# Patient Record
Sex: FEMALE | Race: OTHER | NOT HISPANIC OR LATINO
[De-identification: names, ages, dates, MRNs, and addresses within clinical notes are randomized per-mention and may not be internally consistent; named-entity substitution may affect disease eponyms.]

---

## 2017-05-11 ENCOUNTER — TRANSCRIPTION ENCOUNTER (OUTPATIENT)
Age: 82
End: 2017-05-11

## 2017-05-11 ENCOUNTER — INPATIENT (INPATIENT)
Facility: HOSPITAL | Age: 82
LOS: 6 days | Discharge: NURSING, REHAB OR LONG-TERM CARE | DRG: 480 | End: 2017-05-18
Attending: INTERNAL MEDICINE | Admitting: INTERNAL MEDICINE
Payer: MEDICARE

## 2017-05-11 VITALS
HEIGHT: 58 IN | RESPIRATION RATE: 18 BRPM | WEIGHT: 130.07 LBS | SYSTOLIC BLOOD PRESSURE: 200 MMHG | OXYGEN SATURATION: 93 % | DIASTOLIC BLOOD PRESSURE: 77 MMHG | TEMPERATURE: 98 F | HEART RATE: 82 BPM

## 2017-05-11 DIAGNOSIS — I10 ESSENTIAL (PRIMARY) HYPERTENSION: ICD-10-CM

## 2017-05-11 DIAGNOSIS — S72.009A FRACTURE OF UNSPECIFIED PART OF NECK OF UNSPECIFIED FEMUR, INITIAL ENCOUNTER FOR CLOSED FRACTURE: ICD-10-CM

## 2017-05-11 DIAGNOSIS — Z29.9 ENCOUNTER FOR PROPHYLACTIC MEASURES, UNSPECIFIED: ICD-10-CM

## 2017-05-11 DIAGNOSIS — S72.002A FRACTURE OF UNSPECIFIED PART OF NECK OF LEFT FEMUR, INITIAL ENCOUNTER FOR CLOSED FRACTURE: ICD-10-CM

## 2017-05-11 DIAGNOSIS — G30.1 ALZHEIMER'S DISEASE WITH LATE ONSET: ICD-10-CM

## 2017-05-11 LAB
ABO RH CONFIRMATION: SIGNIFICANT CHANGE UP
ALBUMIN SERPL ELPH-MCNC: 4 G/DL — SIGNIFICANT CHANGE UP (ref 3.5–5)
ALP SERPL-CCNC: 108 U/L — SIGNIFICANT CHANGE UP (ref 40–120)
ALT FLD-CCNC: 24 U/L DA — SIGNIFICANT CHANGE UP (ref 10–60)
ANION GAP SERPL CALC-SCNC: 8 MMOL/L — SIGNIFICANT CHANGE UP (ref 5–17)
APTT BLD: 32.8 SEC — SIGNIFICANT CHANGE UP (ref 27.5–37.4)
AST SERPL-CCNC: 36 U/L — SIGNIFICANT CHANGE UP (ref 10–40)
BILIRUB SERPL-MCNC: 0.5 MG/DL — SIGNIFICANT CHANGE UP (ref 0.2–1.2)
BUN SERPL-MCNC: 19 MG/DL — HIGH (ref 7–18)
CALCIUM SERPL-MCNC: 9.4 MG/DL — SIGNIFICANT CHANGE UP (ref 8.4–10.5)
CHLORIDE SERPL-SCNC: 110 MMOL/L — HIGH (ref 96–108)
CO2 SERPL-SCNC: 26 MMOL/L — SIGNIFICANT CHANGE UP (ref 22–31)
CREAT SERPL-MCNC: 1.12 MG/DL — SIGNIFICANT CHANGE UP (ref 0.5–1.3)
EOSINOPHIL NFR BLD AUTO: 1 % — SIGNIFICANT CHANGE UP (ref 0–6)
GLUCOSE SERPL-MCNC: 114 MG/DL — HIGH (ref 70–99)
HCT VFR BLD CALC: 45.6 % — HIGH (ref 34.5–45)
HGB BLD-MCNC: 14.5 G/DL — SIGNIFICANT CHANGE UP (ref 11.5–15.5)
INR BLD: 1.03 RATIO — SIGNIFICANT CHANGE UP (ref 0.88–1.16)
LYMPHOCYTES # BLD AUTO: 69 % — HIGH (ref 13–44)
MCHC RBC-ENTMCNC: 30.8 PG — SIGNIFICANT CHANGE UP (ref 27–34)
MCHC RBC-ENTMCNC: 31.8 GM/DL — LOW (ref 32–36)
MCV RBC AUTO: 96.9 FL — SIGNIFICANT CHANGE UP (ref 80–100)
MONOCYTES NFR BLD AUTO: 6 % — SIGNIFICANT CHANGE UP (ref 2–14)
NEUTROPHILS NFR BLD AUTO: 21 % — LOW (ref 43–77)
PLATELET # BLD AUTO: 167 K/UL — SIGNIFICANT CHANGE UP (ref 150–400)
POTASSIUM SERPL-MCNC: 4.2 MMOL/L — SIGNIFICANT CHANGE UP (ref 3.5–5.3)
POTASSIUM SERPL-SCNC: 4.2 MMOL/L — SIGNIFICANT CHANGE UP (ref 3.5–5.3)
PROT SERPL-MCNC: 7.1 G/DL — SIGNIFICANT CHANGE UP (ref 6–8.3)
PROTHROM AB SERPL-ACNC: 11.2 SEC — SIGNIFICANT CHANGE UP (ref 9.8–12.7)
RBC # BLD: 4.71 M/UL — SIGNIFICANT CHANGE UP (ref 3.8–5.2)
RBC # FLD: 12.3 % — SIGNIFICANT CHANGE UP (ref 10.3–14.5)
SODIUM SERPL-SCNC: 144 MMOL/L — SIGNIFICANT CHANGE UP (ref 135–145)
WBC # BLD: 43.9 K/UL — CRITICAL HIGH (ref 3.8–10.5)
WBC # FLD AUTO: 43.9 K/UL — CRITICAL HIGH (ref 3.8–10.5)

## 2017-05-11 PROCEDURE — 99285 EMERGENCY DEPT VISIT HI MDM: CPT | Mod: 25

## 2017-05-11 PROCEDURE — 71010: CPT | Mod: 26

## 2017-05-11 PROCEDURE — 27245 TREAT THIGH FRACTURE: CPT | Mod: AS,LT

## 2017-05-11 PROCEDURE — 73552 X-RAY EXAM OF FEMUR 2/>: CPT | Mod: 26,LT

## 2017-05-11 PROCEDURE — 73502 X-RAY EXAM HIP UNI 2-3 VIEWS: CPT | Mod: 26,LT

## 2017-05-11 RX ORDER — FERROUS SULFATE 325(65) MG
325 TABLET ORAL
Qty: 0 | Refills: 0 | Status: DISCONTINUED | OUTPATIENT
Start: 2017-05-11 | End: 2017-05-18

## 2017-05-11 RX ORDER — ONDANSETRON 8 MG/1
4 TABLET, FILM COATED ORAL EVERY 6 HOURS
Qty: 0 | Refills: 0 | Status: DISCONTINUED | OUTPATIENT
Start: 2017-05-11 | End: 2017-05-18

## 2017-05-11 RX ORDER — CEFAZOLIN SODIUM 1 G
2000 VIAL (EA) INJECTION EVERY 8 HOURS
Qty: 0 | Refills: 0 | Status: COMPLETED | OUTPATIENT
Start: 2017-05-12 | End: 2017-05-12

## 2017-05-11 RX ORDER — MORPHINE SULFATE 50 MG/1
2 CAPSULE, EXTENDED RELEASE ORAL EVERY 6 HOURS
Qty: 0 | Refills: 0 | Status: DISCONTINUED | OUTPATIENT
Start: 2017-05-11 | End: 2017-05-11

## 2017-05-11 RX ORDER — METOPROLOL TARTRATE 50 MG
25 TABLET ORAL DAILY
Qty: 0 | Refills: 0 | Status: DISCONTINUED | OUTPATIENT
Start: 2017-05-11 | End: 2017-05-11

## 2017-05-11 RX ORDER — SODIUM CHLORIDE 9 MG/ML
1000 INJECTION, SOLUTION INTRAVENOUS
Qty: 0 | Refills: 0 | Status: DISCONTINUED | OUTPATIENT
Start: 2017-05-11 | End: 2017-05-11

## 2017-05-11 RX ORDER — METOPROLOL TARTRATE 50 MG
25 TABLET ORAL DAILY
Qty: 0 | Refills: 0 | Status: DISCONTINUED | OUTPATIENT
Start: 2017-05-11 | End: 2017-05-14

## 2017-05-11 RX ORDER — DEXTROSE MONOHYDRATE, SODIUM CHLORIDE, AND POTASSIUM CHLORIDE 50; .745; 4.5 G/1000ML; G/1000ML; G/1000ML
1000 INJECTION, SOLUTION INTRAVENOUS
Qty: 0 | Refills: 0 | Status: DISCONTINUED | OUTPATIENT
Start: 2017-05-11 | End: 2017-05-11

## 2017-05-11 RX ORDER — PANTOPRAZOLE SODIUM 20 MG/1
40 TABLET, DELAYED RELEASE ORAL DAILY
Qty: 0 | Refills: 0 | Status: DISCONTINUED | OUTPATIENT
Start: 2017-05-11 | End: 2017-05-11

## 2017-05-11 RX ORDER — MORPHINE SULFATE 50 MG/1
2 CAPSULE, EXTENDED RELEASE ORAL ONCE
Qty: 0 | Refills: 0 | Status: DISCONTINUED | OUTPATIENT
Start: 2017-05-11 | End: 2017-05-11

## 2017-05-11 RX ORDER — ENOXAPARIN SODIUM 100 MG/ML
30 INJECTION SUBCUTANEOUS EVERY 12 HOURS
Qty: 0 | Refills: 0 | Status: DISCONTINUED | OUTPATIENT
Start: 2017-05-11 | End: 2017-05-18

## 2017-05-11 RX ORDER — ONDANSETRON 8 MG/1
4 TABLET, FILM COATED ORAL EVERY 6 HOURS
Qty: 0 | Refills: 0 | Status: COMPLETED | OUTPATIENT
Start: 2017-05-11 | End: 2017-05-11

## 2017-05-11 RX ORDER — FENTANYL CITRATE 50 UG/ML
25 INJECTION INTRAVENOUS
Qty: 0 | Refills: 0 | Status: DISCONTINUED | OUTPATIENT
Start: 2017-05-11 | End: 2017-05-11

## 2017-05-11 RX ORDER — ASCORBIC ACID 60 MG
500 TABLET,CHEWABLE ORAL
Qty: 0 | Refills: 0 | Status: DISCONTINUED | OUTPATIENT
Start: 2017-05-11 | End: 2017-05-18

## 2017-05-11 RX ORDER — FOLIC ACID 0.8 MG
1 TABLET ORAL DAILY
Qty: 0 | Refills: 0 | Status: DISCONTINUED | OUTPATIENT
Start: 2017-05-11 | End: 2017-05-18

## 2017-05-11 RX ORDER — ASPIRIN/CALCIUM CARB/MAGNESIUM 324 MG
325 TABLET ORAL
Qty: 0 | Refills: 0 | Status: DISCONTINUED | OUTPATIENT
Start: 2017-05-11 | End: 2017-05-11

## 2017-05-11 RX ORDER — MIRTAZAPINE 45 MG/1
15 TABLET, ORALLY DISINTEGRATING ORAL AT BEDTIME
Qty: 0 | Refills: 0 | Status: DISCONTINUED | OUTPATIENT
Start: 2017-05-11 | End: 2017-05-18

## 2017-05-11 RX ORDER — ACETAMINOPHEN 500 MG
650 TABLET ORAL EVERY 6 HOURS
Qty: 0 | Refills: 0 | Status: DISCONTINUED | OUTPATIENT
Start: 2017-05-11 | End: 2017-05-18

## 2017-05-11 RX ORDER — DOCUSATE SODIUM 100 MG
100 CAPSULE ORAL THREE TIMES A DAY
Qty: 0 | Refills: 0 | Status: DISCONTINUED | OUTPATIENT
Start: 2017-05-11 | End: 2017-05-17

## 2017-05-11 RX ADMIN — MORPHINE SULFATE 2 MILLIGRAM(S): 50 CAPSULE, EXTENDED RELEASE ORAL at 08:28

## 2017-05-11 RX ADMIN — ONDANSETRON 4 MILLIGRAM(S): 8 TABLET, FILM COATED ORAL at 11:52

## 2017-05-11 NOTE — H&P ADULT - ATTENDING COMMENTS
Chart reviewed, Patient seen and examined, d/w ER MD, d/w Admitting resident and agree with management.  d/w resident about preop, moderate risk for surgery.

## 2017-05-11 NOTE — ED PROVIDER NOTE - MEDICAL DECISION MAKING DETAILS
Pt is a 89F with left intertrochanteric fx. Pt given morphine IV for pain control. pt admitted to Dr. Schmitt. Pending surgical consult and labs.

## 2017-05-11 NOTE — H&P ADULT - NEGATIVE NEUROLOGICAL SYMPTOMS
no syncope/no transient paralysis/no weakness/no headache/no confusion/no loss of sensation/no difficulty walking/no loss of consciousness/no hemiparesis/no generalized seizures/no vertigo/no paresthesias/no tremors/no focal seizures

## 2017-05-11 NOTE — H&P ADULT - ASSESSMENT
89 yo F from home lives alone with HHA 7 days and 24 hrs, ambulated with a walker and AO x 2 (person and place). PMH CLL, HTN, depression, R hip + femur and wrist fx (s/p repair in 2012), PNA (2016 admitted at [facility]), macular degeneration and Alzheimer's dementia. Pt was walking to the bathroom with her walker with assistance from her HHA. HHA was able to catch her and guide her gently to the floor. Pt landed on left hip, pt complaining of left hip pain exacerbated by movement and touch.

## 2017-05-11 NOTE — H&P ADULT - NEGATIVE GASTROINTESTINAL SYMPTOMS
no constipation/no diarrhea/no abdominal pain/no nausea/no hiccoughs/no melena/no vomiting/no hematochezia/no steatorrhea/no jaundice/no change in bowel habits/no flatulence

## 2017-05-11 NOTE — H&P ADULT - NEGATIVE ENMT SYMPTOMS
no tinnitus/no nasal discharge/no hearing difficulty/no nasal congestion/no vertigo/no sinus symptoms/no ear pain

## 2017-05-11 NOTE — ED ADULT TRIAGE NOTE - CHIEF COMPLAINT QUOTE
s/p fall at 5 am stating pt is going to the br with her aide when pt fell no locc/o pain in her left hip

## 2017-05-11 NOTE — H&P ADULT - NEUROLOGICAL DETAILS
responds to pain/deep reflexes intact/superficial reflexes intact/sensation intact/cranial nerves intact/no spontaneous movement/responds to verbal commands

## 2017-05-11 NOTE — CONSULT NOTE ADULT - SUBJECTIVE AND OBJECTIVE BOX
GRETCHEN TOURE  MRN-[id number]    ORTHOPEDIC CONSULT    Diagnosis:  LEFT Hip Intertrochanteric Fracture    89 Y/O female came into the ER BIBEMS s/p fall at home yesterday.  The patient was ambulating, using her walker and her legs "gave out" and she slid down  from her walker and broke her fall using her left hip.  This was a witnessed fall while the HHA was there caring for her.  Patient is a household ambulator and uses the walker as an assisted device. There was no head trauma and after the incident, the patient was unable to ambulate nor bear weight onto the affect left hip.   Pt denies Chest pain, SOB, dyspnea, paresthesias, N/V/D, abdominal pain, syncope, or pain anywhere else.     Patient was seen and evaluated at bedside.   Pain is  _well controlled. However, pain increases with ROM of the left leg.     Pt has a h/o RIGHT HIP IM nailing.     Vital Signs Last 24 Hrs  T(C): 36.4, Max: 36.9 (05-11 @ 06:30)  T(F): 97.5, Max: 98.5 (05-11 @ 06:30)  HR: 70 (70 - 91)  BP: 109/52 (109/52 - 200/77)  BP(mean): --  RR: 18 (18 - 18)  SpO2: 97% (93% - 98%)  I&O's Detail      Physical Exam:    General: AAOx3, NAD, resting comfortably in bed.    LEFT Hip:   Skin is pink and warm. Tender at the fracture site. Decreased ROM of the affected hip due to pain. SILT.  Lower extremity:  No calf tenderness, calves are soft. 2+pulses. NVI. 5/5 Strength of EHL/TA/gastrocnemius B/L.  Good capillary refill. Warm, well-perfused.                           14.5   43.9  )-----------( 167      ( 11 May 2017 08:28 )             45.6     05-11    144  |  110<H>  |  19<H>  ----------------------------<  114<H>  4.2   |  26  |  1.12    Ca    9.4      11 May 2017 08:28    TPro  7.1  /  Alb  4.0  /  TBili  0.5  /  DBili  x   /  AST  36  /  ALT  24  /  AlkPhos  108  05-11      Impression:  89yFemale with LEFT Hip Intertrochanteric Fracture    Plan:  -  Pain management  -  Dvt prophylaxis with Venodynes  -  Keep NPO today  -  Surgeon:  Dr. KIMBROUGH  -  Procedure:  _LEFT HIP IM NAILING  -  For Surgery today 5/11/2017  -  Medical Optimization. Case d/w Dr Schmitt who cleared patient as moderate risk for surgery  -  Consent: Pending  -  Case d/w DR. MAYER

## 2017-05-11 NOTE — H&P ADULT - PROBLEM SELECTOR PLAN 1
pt presented s/p mechanical fall   Hip x-ray showing left intertrochanteric femoral fracture  Orthopedics surgery consulted for possible IM nailing   c/w pain management   pt going for OR at 5pm   pt at moderate to high risk for OR as per medical attending   Orthopedics consulted Dr. Parsons

## 2017-05-11 NOTE — H&P ADULT - GASTROINTESTINAL DETAILS
no rebound tenderness/soft/no bruit/no guarding/nontender/normal/no masses palpable/no distention/bowel sounds normal

## 2017-05-11 NOTE — H&P ADULT - NEGATIVE MUSCULOSKELETAL SYMPTOMS
no joint swelling/no arthralgia/no muscle cramps/no muscle weakness/no stiffness/no myalgia/no arthritis/no neck pain

## 2017-05-11 NOTE — H&P ADULT - RS GEN PE MLT RESP DETAILS PC
clear to auscultation bilaterally/no wheezes/normal/breath sounds equal/no chest wall tenderness/respirations non-labored/no subcutaneous emphysema/no intercostal retractions/no rales/good air movement/airway patent/no rhonchi

## 2017-05-11 NOTE — H&P ADULT - HISTORY OF PRESENT ILLNESS
89 yo F PMH CLL, HTN, depression, R hip + femur and wrist fx. Pt had R hip repair surgery at [facility] on Nov 23 and discharged to [facility] Dec 3. Presents to the ED with daughter and home health aid presents to the ED s/p mechanical fall x 8:30 PM. As per home health aid, pt refused walker, tripped, fell and hit her head. Daughter also notes pt currently has UTI, was put on Cipro for 2 days, but is still having hallucinations. Pt denies LOC, being on blood thinners, fever, chills, or any other 89 yo F PMH CLL, HTN, depression, R hip + femur and wrist fx. Pt had R hip repair surgery at [facility] on Nov 23 and discharged to [facility] Dec 3. Presents to ED for fall. As per home ron aid, the patient tried to walk by herself and lost balance and fell onto her hip. No LOC, no head injury. Pt is at baseline 89 yo F from home lives alone with HHA 7 days and 24 hrs, ambulated with a walker and AO x 2 (person and place). PMH CLL, HTN, depression, R hip + femur and wrist fx (s/p repair in 2012), PNA (2016 admitted at [place]), macular degeneration and Alzheimer's dementia. Pt was walking to the bathroom with her walker with assistance from her HHA. HHA was able to catch her and guide her gently to the floor. Pt landed on left hip, pt complaining of left hip pain exacerbated by movement and touch. Denies pain on other locations, fever, chills, dysuria abdominal pain, chest pain or SOB.     GOC discussed with daughter regarding code status, spoke about no benefit from CPR / Intubation at advance age and comorbidities. Daughter and HCP Gaby Helton will discuss with her sister and brother regarding code status.

## 2017-05-11 NOTE — ED PROVIDER NOTE - OBJECTIVE STATEMENT
Pt is a 89F with significant history of dementia who presents to ED for fall. As per home ron aid, the patient tried to walk by herself and lost balance and fell onto her hip. No LOC, no head injury. Pt is at baseline

## 2017-05-11 NOTE — H&P ADULT - NEGATIVE CARDIOVASCULAR SYMPTOMS
no peripheral edema/no orthopnea/no palpitations/no paroxysmal nocturnal dyspnea/no claudication/no chest pain/no dyspnea on exertion

## 2017-05-11 NOTE — ED ADULT NURSE NOTE - OBJECTIVE STATEMENT
Patient came to the ED alert and confused for s/p fall at home. Patient's aide states she was being assisted to the bathroom when the patient grabbed her hand and pulled her down the floor. No LOC c/o left hip pain.

## 2017-05-11 NOTE — H&P ADULT - PROBLEM SELECTOR PLAN 2
pt takes metoprolol 25mg ER daily   c/w home medications with parameters  f/u lipid profile, TSH and HbA1C

## 2017-05-11 NOTE — ED ADULT NURSE NOTE - PMH
CLL (chronic lymphocytic leukemia)    Dementia    Depression    HTN (hypertension)    Hypertension    UTI (urinary tract infection)

## 2017-05-11 NOTE — H&P ADULT - NEGATIVE GENERAL SYMPTOMS
no sweating/no anorexia/no weight loss/no polyphagia/no fatigue/no polyuria/no weight gain/no fever/no malaise/no polydipsia/no chills

## 2017-05-12 LAB
24R-OH-CALCIDIOL SERPL-MCNC: 28.3 NG/ML — LOW (ref 30–100)
ALBUMIN SERPL ELPH-MCNC: 2.6 G/DL — LOW (ref 3.5–5)
ALBUMIN SERPL ELPH-MCNC: 2.7 G/DL — LOW (ref 3.5–5)
ALP SERPL-CCNC: 61 U/L — SIGNIFICANT CHANGE UP (ref 40–120)
ALP SERPL-CCNC: 69 U/L — SIGNIFICANT CHANGE UP (ref 40–120)
ALT FLD-CCNC: 14 U/L DA — SIGNIFICANT CHANGE UP (ref 10–60)
ALT FLD-CCNC: 18 U/L DA — SIGNIFICANT CHANGE UP (ref 10–60)
ANION GAP SERPL CALC-SCNC: 11 MMOL/L — SIGNIFICANT CHANGE UP (ref 5–17)
ANION GAP SERPL CALC-SCNC: 7 MMOL/L — SIGNIFICANT CHANGE UP (ref 5–17)
APPEARANCE UR: CLEAR — SIGNIFICANT CHANGE UP
AST SERPL-CCNC: 31 U/L — SIGNIFICANT CHANGE UP (ref 10–40)
AST SERPL-CCNC: 35 U/L — SIGNIFICANT CHANGE UP (ref 10–40)
BASE EXCESS BLDA CALC-SCNC: -1.8 MMOL/L — SIGNIFICANT CHANGE UP (ref -2–2)
BASOPHILS # BLD AUTO: 0.3 K/UL — HIGH (ref 0–0.2)
BASOPHILS # BLD AUTO: 0.3 K/UL — HIGH (ref 0–0.2)
BASOPHILS NFR BLD AUTO: 1 % — SIGNIFICANT CHANGE UP (ref 0–2)
BASOPHILS NFR BLD AUTO: 1.1 % — SIGNIFICANT CHANGE UP (ref 0–2)
BILIRUB SERPL-MCNC: 0.3 MG/DL — SIGNIFICANT CHANGE UP (ref 0.2–1.2)
BILIRUB SERPL-MCNC: 0.6 MG/DL — SIGNIFICANT CHANGE UP (ref 0.2–1.2)
BILIRUB UR-MCNC: NEGATIVE — SIGNIFICANT CHANGE UP
BLOOD GAS COMMENTS ARTERIAL: SIGNIFICANT CHANGE UP
BUN SERPL-MCNC: 20 MG/DL — HIGH (ref 7–18)
BUN SERPL-MCNC: 24 MG/DL — HIGH (ref 7–18)
CALCIUM SERPL-MCNC: 7.8 MG/DL — LOW (ref 8.4–10.5)
CALCIUM SERPL-MCNC: 7.9 MG/DL — LOW (ref 8.4–10.5)
CHLORIDE SERPL-SCNC: 110 MMOL/L — HIGH (ref 96–108)
CHLORIDE SERPL-SCNC: 111 MMOL/L — HIGH (ref 96–108)
CHOLEST SERPL-MCNC: 118 MG/DL — SIGNIFICANT CHANGE UP (ref 10–199)
CK MB BLD-MCNC: 1 % — SIGNIFICANT CHANGE UP (ref 0–3.5)
CK MB BLD-MCNC: <0.5 % — SIGNIFICANT CHANGE UP (ref 0–3.5)
CK MB CFR SERPL CALC: 1.8 NG/ML — SIGNIFICANT CHANGE UP (ref 0–3.6)
CK MB CFR SERPL CALC: <1 NG/ML — SIGNIFICANT CHANGE UP (ref 0–3.6)
CK SERPL-CCNC: 186 U/L — SIGNIFICANT CHANGE UP (ref 21–215)
CK SERPL-CCNC: 190 U/L — SIGNIFICANT CHANGE UP (ref 21–215)
CO2 SERPL-SCNC: 23 MMOL/L — SIGNIFICANT CHANGE UP (ref 22–31)
CO2 SERPL-SCNC: 27 MMOL/L — SIGNIFICANT CHANGE UP (ref 22–31)
COLOR SPEC: YELLOW — SIGNIFICANT CHANGE UP
CREAT SERPL-MCNC: 1.46 MG/DL — HIGH (ref 0.5–1.3)
CREAT SERPL-MCNC: 1.84 MG/DL — HIGH (ref 0.5–1.3)
DIFF PNL FLD: NEGATIVE — SIGNIFICANT CHANGE UP
EOSINOPHIL # BLD AUTO: 0 K/UL — SIGNIFICANT CHANGE UP (ref 0–0.5)
EOSINOPHIL # BLD AUTO: 0 K/UL — SIGNIFICANT CHANGE UP (ref 0–0.5)
EOSINOPHIL NFR BLD AUTO: 0 % — SIGNIFICANT CHANGE UP (ref 0–6)
EOSINOPHIL NFR BLD AUTO: 0 % — SIGNIFICANT CHANGE UP (ref 0–6)
FOLATE SERPL-MCNC: >20 NG/ML — SIGNIFICANT CHANGE UP (ref 4.8–24.2)
GLUCOSE SERPL-MCNC: 130 MG/DL — HIGH (ref 70–99)
GLUCOSE SERPL-MCNC: 162 MG/DL — HIGH (ref 70–99)
GLUCOSE UR QL: NEGATIVE — SIGNIFICANT CHANGE UP
HBA1C BLD-MCNC: 6.2 % — HIGH (ref 4–5.6)
HCO3 BLDA-SCNC: 22 MMOL/L — LOW (ref 23–27)
HCT VFR BLD CALC: 26.9 % — LOW (ref 34.5–45)
HCT VFR BLD CALC: 30.4 % — LOW (ref 34.5–45)
HCT VFR BLD CALC: 32.3 % — LOW (ref 34.5–45)
HDLC SERPL-MCNC: 43 MG/DL — SIGNIFICANT CHANGE UP (ref 40–125)
HGB BLD-MCNC: 10.6 G/DL — LOW (ref 11.5–15.5)
HGB BLD-MCNC: 8.8 G/DL — LOW (ref 11.5–15.5)
HGB BLD-MCNC: 9.9 G/DL — LOW (ref 11.5–15.5)
HOROWITZ INDEX BLDA+IHG-RTO: 32 — SIGNIFICANT CHANGE UP
KETONES UR-MCNC: NEGATIVE — SIGNIFICANT CHANGE UP
LACTATE SERPL-SCNC: 2 MMOL/L — SIGNIFICANT CHANGE UP (ref 0.7–2)
LEUKOCYTE ESTERASE UR-ACNC: NEGATIVE — SIGNIFICANT CHANGE UP
LIPID PNL WITH DIRECT LDL SERPL: 66 MG/DL — SIGNIFICANT CHANGE UP
LYMPHOCYTES # BLD AUTO: 16.9 K/UL — HIGH (ref 1–3.3)
LYMPHOCYTES # BLD AUTO: 20.6 K/UL — HIGH (ref 1–3.3)
LYMPHOCYTES # BLD AUTO: 63.5 % — HIGH (ref 13–44)
LYMPHOCYTES # BLD AUTO: 69.1 % — HIGH (ref 13–44)
MAGNESIUM SERPL-MCNC: 1.6 MG/DL — SIGNIFICANT CHANGE UP (ref 1.6–2.6)
MAGNESIUM SERPL-MCNC: 1.8 MG/DL — SIGNIFICANT CHANGE UP (ref 1.6–2.6)
MCHC RBC-ENTMCNC: 31.6 PG — SIGNIFICANT CHANGE UP (ref 27–34)
MCHC RBC-ENTMCNC: 31.7 PG — SIGNIFICANT CHANGE UP (ref 27–34)
MCHC RBC-ENTMCNC: 31.8 PG — SIGNIFICANT CHANGE UP (ref 27–34)
MCHC RBC-ENTMCNC: 32.6 GM/DL — SIGNIFICANT CHANGE UP (ref 32–36)
MCHC RBC-ENTMCNC: 32.6 GM/DL — SIGNIFICANT CHANGE UP (ref 32–36)
MCHC RBC-ENTMCNC: 32.9 GM/DL — SIGNIFICANT CHANGE UP (ref 32–36)
MCV RBC AUTO: 96.7 FL — SIGNIFICANT CHANGE UP (ref 80–100)
MCV RBC AUTO: 96.8 FL — SIGNIFICANT CHANGE UP (ref 80–100)
MCV RBC AUTO: 97.4 FL — SIGNIFICANT CHANGE UP (ref 80–100)
MONOCYTES # BLD AUTO: 1.2 K/UL — HIGH (ref 0–0.9)
MONOCYTES # BLD AUTO: 1.2 K/UL — HIGH (ref 0–0.9)
MONOCYTES NFR BLD AUTO: 4 % — SIGNIFICANT CHANGE UP (ref 2–14)
MONOCYTES NFR BLD AUTO: 4.4 % — SIGNIFICANT CHANGE UP (ref 2–14)
NEUTROPHILS # BLD AUTO: 7.7 K/UL — HIGH (ref 1.8–7.4)
NEUTROPHILS # BLD AUTO: 8.2 K/UL — HIGH (ref 1.8–7.4)
NEUTROPHILS NFR BLD AUTO: 25.9 % — LOW (ref 43–77)
NEUTROPHILS NFR BLD AUTO: 30.9 % — LOW (ref 43–77)
NITRITE UR-MCNC: NEGATIVE — SIGNIFICANT CHANGE UP
PCO2 BLDA: 37 MMHG — SIGNIFICANT CHANGE UP (ref 32–46)
PH BLDA: 7.4 — SIGNIFICANT CHANGE UP (ref 7.35–7.45)
PH UR: 6 — SIGNIFICANT CHANGE UP (ref 5–8)
PHOSPHATE SERPL-MCNC: 3.1 MG/DL — SIGNIFICANT CHANGE UP (ref 2.5–4.5)
PHOSPHATE SERPL-MCNC: 3.1 MG/DL — SIGNIFICANT CHANGE UP (ref 2.5–4.5)
PLATELET # BLD AUTO: 106 K/UL — LOW (ref 150–400)
PLATELET # BLD AUTO: 116 K/UL — LOW (ref 150–400)
PLATELET # BLD AUTO: 86 K/UL — LOW (ref 150–400)
PO2 BLDA: 79 MMHG — SIGNIFICANT CHANGE UP (ref 74–108)
POTASSIUM SERPL-MCNC: 4.4 MMOL/L — SIGNIFICANT CHANGE UP (ref 3.5–5.3)
POTASSIUM SERPL-MCNC: 5.2 MMOL/L — SIGNIFICANT CHANGE UP (ref 3.5–5.3)
POTASSIUM SERPL-SCNC: 4.4 MMOL/L — SIGNIFICANT CHANGE UP (ref 3.5–5.3)
POTASSIUM SERPL-SCNC: 5.2 MMOL/L — SIGNIFICANT CHANGE UP (ref 3.5–5.3)
PROT SERPL-MCNC: 5.1 G/DL — LOW (ref 6–8.3)
PROT SERPL-MCNC: 5.4 G/DL — LOW (ref 6–8.3)
PROT UR-MCNC: 30 MG/DL
RBC # BLD: 2.78 M/UL — LOW (ref 3.8–5.2)
RBC # BLD: 3.12 M/UL — LOW (ref 3.8–5.2)
RBC # BLD: 3.34 M/UL — LOW (ref 3.8–5.2)
RBC # FLD: 12.3 % — SIGNIFICANT CHANGE UP (ref 10.3–14.5)
RBC # FLD: 12.4 % — SIGNIFICANT CHANGE UP (ref 10.3–14.5)
RBC # FLD: 12.6 % — SIGNIFICANT CHANGE UP (ref 10.3–14.5)
SAO2 % BLDA: 96 % — SIGNIFICANT CHANGE UP (ref 92–96)
SODIUM SERPL-SCNC: 144 MMOL/L — SIGNIFICANT CHANGE UP (ref 135–145)
SODIUM SERPL-SCNC: 145 MMOL/L — SIGNIFICANT CHANGE UP (ref 135–145)
SP GR SPEC: 1.01 — SIGNIFICANT CHANGE UP (ref 1.01–1.02)
TOTAL CHOLESTEROL/HDL RATIO MEASUREMENT: 2.7 RATIO — LOW (ref 3.3–7.1)
TRIGL SERPL-MCNC: 47 MG/DL — SIGNIFICANT CHANGE UP (ref 10–149)
TROPONIN I SERPL-MCNC: 0.04 NG/ML — SIGNIFICANT CHANGE UP (ref 0–0.04)
TROPONIN I SERPL-MCNC: 0.04 NG/ML — SIGNIFICANT CHANGE UP (ref 0–0.04)
TSH SERPL-MCNC: 0.52 UU/ML — SIGNIFICANT CHANGE UP (ref 0.34–4.82)
UROBILINOGEN FLD QL: NEGATIVE — SIGNIFICANT CHANGE UP
VIT B12 SERPL-MCNC: 911 PG/ML — HIGH (ref 243–894)
WBC # BLD: 23.4 K/UL — HIGH (ref 3.8–10.5)
WBC # BLD: 26.6 K/UL — HIGH (ref 3.8–10.5)
WBC # BLD: 29.8 K/UL — HIGH (ref 3.8–10.5)
WBC # FLD AUTO: 23.4 K/UL — HIGH (ref 3.8–10.5)
WBC # FLD AUTO: 26.6 K/UL — HIGH (ref 3.8–10.5)
WBC # FLD AUTO: 29.8 K/UL — HIGH (ref 3.8–10.5)

## 2017-05-12 PROCEDURE — 71010: CPT | Mod: 26

## 2017-05-12 PROCEDURE — 70450 CT HEAD/BRAIN W/O DYE: CPT | Mod: 26

## 2017-05-12 RX ORDER — SODIUM CHLORIDE 9 MG/ML
1000 INJECTION, SOLUTION INTRAVENOUS
Qty: 0 | Refills: 0 | Status: DISCONTINUED | OUTPATIENT
Start: 2017-05-12 | End: 2017-05-15

## 2017-05-12 RX ORDER — ACETAMINOPHEN 500 MG
650 TABLET ORAL EVERY 4 HOURS
Qty: 0 | Refills: 0 | Status: DISCONTINUED | OUTPATIENT
Start: 2017-05-12 | End: 2017-05-18

## 2017-05-12 RX ORDER — SODIUM CHLORIDE 9 MG/ML
1000 INJECTION, SOLUTION INTRAVENOUS
Qty: 0 | Refills: 0 | Status: DISCONTINUED | OUTPATIENT
Start: 2017-05-12 | End: 2017-05-12

## 2017-05-12 RX ORDER — PIPERACILLIN AND TAZOBACTAM 4; .5 G/20ML; G/20ML
3.38 INJECTION, POWDER, LYOPHILIZED, FOR SOLUTION INTRAVENOUS EVERY 8 HOURS
Qty: 0 | Refills: 0 | Status: DISCONTINUED | OUTPATIENT
Start: 2017-05-12 | End: 2017-05-18

## 2017-05-12 RX ORDER — PIPERACILLIN AND TAZOBACTAM 4; .5 G/20ML; G/20ML
3.38 INJECTION, POWDER, LYOPHILIZED, FOR SOLUTION INTRAVENOUS ONCE
Qty: 0 | Refills: 0 | Status: COMPLETED | OUTPATIENT
Start: 2017-05-12 | End: 2017-05-12

## 2017-05-12 RX ADMIN — Medication 325 MILLIGRAM(S): at 18:04

## 2017-05-12 RX ADMIN — Medication 1 MILLIGRAM(S): at 12:22

## 2017-05-12 RX ADMIN — Medication 100 MILLIGRAM(S): at 02:50

## 2017-05-12 RX ADMIN — Medication 100 MILLIGRAM(S): at 12:23

## 2017-05-12 RX ADMIN — Medication 500 MILLIGRAM(S): at 05:47

## 2017-05-12 RX ADMIN — ENOXAPARIN SODIUM 30 MILLIGRAM(S): 100 INJECTION SUBCUTANEOUS at 18:04

## 2017-05-12 RX ADMIN — Medication 650 MILLIGRAM(S): at 18:04

## 2017-05-12 RX ADMIN — Medication 1 TABLET(S): at 12:32

## 2017-05-12 RX ADMIN — Medication 325 MILLIGRAM(S): at 12:22

## 2017-05-12 RX ADMIN — Medication 25 MILLIGRAM(S): at 05:48

## 2017-05-12 RX ADMIN — PIPERACILLIN AND TAZOBACTAM 200 GRAM(S): 4; .5 INJECTION, POWDER, LYOPHILIZED, FOR SOLUTION INTRAVENOUS at 22:04

## 2017-05-12 RX ADMIN — Medication 325 MILLIGRAM(S): at 08:53

## 2017-05-12 RX ADMIN — ENOXAPARIN SODIUM 30 MILLIGRAM(S): 100 INJECTION SUBCUTANEOUS at 05:47

## 2017-05-12 RX ADMIN — Medication 650 MILLIGRAM(S): at 22:38

## 2017-05-12 RX ADMIN — Medication 100 MILLIGRAM(S): at 05:48

## 2017-05-12 RX ADMIN — Medication 500 MILLIGRAM(S): at 18:04

## 2017-05-12 NOTE — CHART NOTE - NSCHARTNOTEFT_GEN_A_CORE
89 yo F from home lives alone with HHA 7 days and 24 hrs, ambulated with a walker and AO x 2 (person and place). PMH CLL, HTN, depression, R hip + femur and wrist fx (s/p repair in 2012), PNA (2016 admitted at [place]), macular degeneration and Alzheimer's dementia presented s/p fall on left hip with       Vitals  T(C): 36.6, Max: 36.9 (05-12 @ 00:21)  HR: 81 (68 - 86)  BP: 138/97 (90/48 - 148/78)  RR: 15 (15 - 23)  SpO2: 97% (91% - 100%)  Wt(kg): --    PHYSICAL EXAM:  GENERAL: NAD, well-groomed, well-developed  HEAD:  Atraumatic, Normocephalic  EYES: EOMI, PERRLA, conjunctiva and sclera clear  ENMT: No tonsillar erythema, exudates, or enlargement; Moist mucous membranes, Good dentition, No lesions  NECK: Supple, No JVD, Normal thyroid  NERVOUS SYSTEM:  MS: AAOx3, speech fluid, comprehension intact, CN: PERRL, EOMI, VFF, V1-V3 b/l Intact, face symmetric, tongue midline, palate symmetric, hearing intact to external rub/whisper bilateral, neck supple, Motor: 5/5 power, negative drift, normal tone  Sensory: sensation grossly intact, Coordination: FNF, HTS, RIP intact, DTR: 2+, toes down, Gait: nl base, stride, eddie. Able to heel/toe/tandem  CHEST/LUNG: Clear to percussion bilaterally; No rales, rhonchi, wheezing, or rubs  HEART: Regular rate and rhythm; No murmurs, rubs, or gallops  ABDOMEN: Soft, Nontender, Nondistended; Bowel sounds present  EXTREMITIES:  2+ Peripheral Pulses, No clubbing, cyanosis, or edema  LYMPH: No lymphadenopathy noted  SKIN: No rashes or lesions      LABS:  05-12    145  |  111<H>  |  20<H>  ----------------------------<  130<H>  5.2   |  23  |  1.46<H>    Ca    7.9<L>      12 May 2017 06:31  Phos  3.1     05-12  Mg     1.6     05-12    TPro  5.4<L>  /  Alb  2.6<L>  /  TBili  0.6  /  DBili      /  AST  35  /  ALT  18  /  AlkPhos  69  05-12    Creatinine Trend: 1.46 <--, 1.12 <--                        9.9    29.8  )-----------( 106      ( 12 May 2017 14:18 )             30.4   Prothrombin Time, Plasma: 11.2 sec (05-11 @ 08:29)  INR: 1.03 ratio (05-11 @ 08:29)  Activated Partial Thromboplastin Time: 32.8 sec (05-11 @ 08:29)    Urine Studies:        RADIOLOGY & ADDITIONAL TESTS:    1. CXR:   2. USG liver:  3. CT abdomen:   4. EKG: 89 yo F from home lives alone with HHA 7 days and 24 hrs, ambulated with a walker and AO x 2 (person and place). PMH CLL, HTN, depression, R hip + femur and wrist fx (s/p repair in ), PNA (2016 admitted at [place]), macular degeneration and Alzheimer's dementia presented s/p fall on left hip with left intertrochanteric fracture. The patient is POD 1 left IM femoral nailing and RRT was called for unresponsiveness. At baseline pt is awake but confused. Last seen at baseline 10 minutes before the rapid response when she was being fed by her health aid. Pt did not complain of chest pain, or cough or vomit prior to the event. Pt had stable vitals (repeat /70) and was moving all extremities to painful stimuli. She did not have spontaneous eye opening however resisted any attempts at opening her eyes. RRT was converted to code stroke and Dr. Juárez evaluated the patient. Less likely to be stroke and tpA was not recommended. Pt is scheduled to get a CT head. Blood work was done, ABG within normal limits. EKG NSR with LVH.       Vitals:  T: rectal - 100.6, HR: 81, BP: 102/70, RR: 15, SpO2: 95% on 3 litres    PHYSICAL EXAM:  GENERAL: NAD, well-groomed, well-developed  HEAD:  Atraumatic, Normocephalic  EYES: resisting eye opening. Unable to assess.   ENMT: Dentures present, food remnants noted.  NECK: Supple, No JVD, Normal thyroid  NERVOUS SYSTEM:  MS: not awake, not alert, not oriented. moving extremities (B/L upper and lower) to deep pain. Unable to assess sensory, higher cortical function, cerebellar movements.  CHEST/LUNG: Clear to percussion bilaterally; No rales, rhonchi, wheezing, or rubs  HEART: Regular rate and rhythm; No murmurs, rubs, or gallops  ABDOMEN: Soft, Nontender, Nondistended; Bowel sounds present  EXTREMITIES:  2+ Peripheral Pulses, No clubbing, cyanosis, or edema. Left hip: non soaked dressing present.   LYMPH: No lymphadenopathy noted  SKIN: No rashes or lesions      LABS:      145  |  111<H>  |  20<H>  ----------------------------<  130<H>  5.2   |  23  |  1.46<H>    Ca    7.9<L>      12 May 2017 06:31  Phos  3.1       Mg     1.6         TPro  5.4<L>  /  Alb  2.6<L>  /  TBili  0.6  /  DBili      /  AST  35  /  ALT  18  /  AlkPhos  69      Creatinine Trend: 1.46 <--, 1.12 <--                        9.9    29.8  )-----------( 106      ( 12 May 2017 14:18 )             30.4   Prothrombin Time, Plasma: 11.2 sec ( @ 08:29)  INR: 1.03 ratio ( @ 08:29)  Activated Partial Thromboplastin Time: 32.8 sec ( @ 08:29)    AB.40/37/79/96% on 3L.      RADIOLOGY & ADDITIONAL TESTS:    1. CXR: Mild diffuse interstitial prominence. No pneumothorax.  2. Xray left hip:  Left hip fracture are again noted. Significant knee   degeneration.  3. EKG: 89 yo F from home lives alone with HHA 7 days and 24 hrs, ambulated with a walker and AO x 2 (person and place). PMH CLL, HTN, depression, R hip + femur and wrist fx (s/p repair in ), PNA (2016 admitted at [place]), macular degeneration and Alzheimer's dementia presented s/p fall on left hip with left intertrochanteric fracture. The patient is POD 1 left IM femoral nailing and RRT was called for unresponsiveness. At baseline pt is awake but confused. Last seen at baseline 10 minutes before the rapid response when she was being fed by her health aid. Pt did not complain of chest pain, or cough or vomit prior to the event. Pt had stable vitals (repeat /70) and was moving all extremities to painful stimuli. She did not have spontaneous eye opening however resisted any attempts at opening her eyes. RRT was converted to code stroke and Dr. Juárez evaluated the patient. Less likely to be stroke and tpA was not recommended. Pt is scheduled to get a CT head. Blood work was done, ABG within normal limits. EKG NSR with LVH.       Vitals:  T: rectal - 100.6, HR: 81, BP: 102/70, RR: 15, SpO2: 95% on 3 litres    PHYSICAL EXAM:  GENERAL: NAD, well-groomed, well-developed  HEAD:  Atraumatic, Normocephalic  EYES: resisting eye opening. Unable to assess.   ENMT: Dentures present, food remnants noted.  NECK: Supple, No JVD, Normal thyroid  NERVOUS SYSTEM:  MS: not awake, not alert, not oriented. moving extremities (B/L upper and lower) to deep pain. Unable to assess sensory, higher cortical function, cerebellar movements.  CHEST/LUNG: Clear to percussion bilaterally; No rales, rhonchi, wheezing, or rubs  HEART: Regular rate and rhythm; No murmurs, rubs, or gallops  ABDOMEN: Soft, Nontender, Nondistended; Bowel sounds present  EXTREMITIES:  2+ Peripheral Pulses, No clubbing, cyanosis, or edema. Left hip: non soaked dressing present.   LYMPH: No lymphadenopathy noted  SKIN: No rashes or lesions      LABS:      145  |  111<H>  |  20<H>  ----------------------------<  130<H>  5.2   |  23  |  1.46<H>    Ca    7.9<L>      12 May 2017 06:31  Phos  3.1       Mg     1.6         TPro  5.4<L>  /  Alb  2.6<L>  /  TBili  0.6  /  DBili      /  AST  35  /  ALT  18  /  AlkPhos  69      Creatinine Trend: 1.46 <--, 1.12 <--                        9.9    29.8  )-----------( 106      ( 12 May 2017 14:18 )             30.4   Prothrombin Time, Plasma: 11.2 sec ( @ 08:29)  INR: 1.03 ratio ( @ 08:29)  Activated Partial Thromboplastin Time: 32.8 sec ( @ 08:29)    AB.40/37/79/96% on 3L.      RADIOLOGY & ADDITIONAL TESTS:    1. CXR: Mild diffuse interstitial prominence. No pneumothorax.  2. Xray left hip:  Left hip fracture are again noted. Significant knee degeneration.  3. EKG: NSR, LVH, T wave flattening lead V1-2    A/p:  1) Unresponsiveness: Differentials include narcotic use vs MI vs stroke vs Infectious etiology vs advanced dementia.   - Pt currently back at baseline mental status. Assessed by neuro, Dr. Juárez as Code stroke was called and low concern for stroke, TPA not suggested.   - CT head ordered. CBC, CMP, ABG, UA. EKG: NSR with LVH, T wave flattening in lead V1-2. f/u cardiac enzymes.   - No urine output since last night. Elevated creatinine. Cunha placement, f/u UA.   - c/w lovenox 30 Q12. 89 yo F from home lives alone with HHA 7 days and 24 hrs, ambulated with a walker and AO x 2 (person and place). PMH CLL, HTN, depression, R hip + femur and wrist fx (s/p repair in ), PNA (2016 admitted at [place]), macular degeneration and Alzheimer's dementia presented s/p fall on left hip with left intertrochanteric fracture. The patient is POD 1 left IM femoral nailing and RRT was called for unresponsiveness. At baseline pt is awake but confused. Last seen at baseline 10 minutes before the rapid response when she was being fed by her health aid. Pt did not complain of chest pain, or cough or vomit prior to the event. Pt had stable vitals (repeat /70) and was moving all extremities to painful stimuli. She did not have spontaneous eye opening however resisted any attempts at opening her eyes. RRT was converted to code stroke and Dr. Juárez evaluated the patient. Less likely to be stroke and tpA was not recommended. Pt is scheduled to get a CT head. Blood work was done, ABG within normal limits. EKG NSR with LVH.       Vitals:  T: rectal - 100.6, HR: 81, BP: 102/70, RR: 15, SpO2: 95% on 3 litres    PHYSICAL EXAM:  GENERAL: NAD, well-groomed, well-developed  HEAD:  Atraumatic, Normocephalic  EYES: resisting eye opening. Unable to assess.   ENMT: Dentures present, food remnants noted.  NECK: Supple, No JVD, Normal thyroid  NERVOUS SYSTEM:  MS: not awake, not alert, not oriented. moving extremities (B/L upper and lower) to deep pain. Unable to assess sensory, higher cortical function, cerebellar movements.  CHEST/LUNG: Clear to percussion bilaterally; No rales, rhonchi, wheezing, or rubs  HEART: Regular rate and rhythm; No murmurs, rubs, or gallops  ABDOMEN: Soft, Nontender, Nondistended; Bowel sounds present  EXTREMITIES:  2+ Peripheral Pulses, No clubbing, cyanosis, or edema. Left hip: non soaked dressing present.   LYMPH: No lymphadenopathy noted  SKIN: No rashes or lesions      LABS:      145  |  111<H>  |  20<H>  ----------------------------<  130<H>  5.2   |  23  |  1.46<H>    Ca    7.9<L>      12 May 2017 06:31  Phos  3.1       Mg     1.6         TPro  5.4<L>  /  Alb  2.6<L>  /  TBili  0.6  /  DBili      /  AST  35  /  ALT  18  /  AlkPhos  69      Creatinine Trend: 1.46 <--, 1.12 <--                        9.9    29.8  )-----------( 106      ( 12 May 2017 14:18 )             30.4   Prothrombin Time, Plasma: 11.2 sec ( @ 08:29)  INR: 1.03 ratio ( @ 08:29)  Activated Partial Thromboplastin Time: 32.8 sec ( @ 08:29)    AB.40/37/79/96% on 3L.      RADIOLOGY & ADDITIONAL TESTS:    1. CXR: Mild diffuse interstitial prominence. No pneumothorax.  2. Xray left hip:  Left hip fracture are again noted. Significant knee degeneration.  3. EKG: NSR, LVH, T wave flattening lead V1-2    A/p:  1) Unresponsiveness: Differentials include active bleed vs narcotic use vs MI vs stroke vs Infectious etiology vs advanced dementia. Vitals stable, .  - Pt currently back at baseline mental status. Assessed by neuro, Dr. Juárez as Code stroke was called and low concern for stroke, TPA not suggested.   - CT head ordered. CBC, CMP, ABG, UA. EKG: NSR with LVH, T wave flattening in lead V1-2. f/u cardiac enzymes. Drop in Hb from 14 to 10 to 9. Will f/u repeat CBC at 9 pm along with Type and cross and transfuse if HB<7 for now c/w lovenox 30 Q12. - No urine output since last night + Elevated creatinine. Cunha placement, f/u UA and urine output.

## 2017-05-12 NOTE — PHYSICAL THERAPY INITIAL EVALUATION ADULT - PASSIVE RANGE OF MOTION EXAMINATION, REHAB EVAL
Both shoulder reduced at  end range, otherwise- WFL. Left  hip fle-40,abd 20. Left knee and ankle-WFL/Right LE Passive ROM was WFL (within functional limits)

## 2017-05-12 NOTE — PROGRESS NOTE ADULT - SUBJECTIVE AND OBJECTIVE BOX
Patient is a 89y old  Female who presents with a chief complaint of falls (11 May 2017 11:38)      INTERVAL HPI/OVERNIGHT EVENTS:  T(C): 36.3, Max: 36.9 (05-12 @ 00:21)  HR: 86 (68 - 86)  BP: 130/84 (109/52 - 148/78)  RR: 18 (17 - 23)  SpO2: 97% (91% - 100%)  Wt(kg): --  I&O's Summary    I & Os for current day (as of 12 May 2017 09:48)  =============================================  IN: 900 ml / OUT: 150 ml / NET: 750 ml      LABS:                        10.6   26.6  )-----------( 116      ( 12 May 2017 06:31 )             32.3     05-12    145  |  111<H>  |  20<H>  ----------------------------<  130<H>  5.2   |  23  |  1.46<H>    Ca    7.9<L>      12 May 2017 06:31  Phos  3.1     05-12  Mg     1.6     05-12    TPro  5.4<L>  /  Alb  2.6<L>  /  TBili  0.6  /  DBili  x   /  AST  35  /  ALT  18  /  AlkPhos  69  05-12    PT/INR - ( 11 May 2017 08:29 )   PT: 11.2 sec;   INR: 1.03 ratio         PTT - ( 11 May 2017 08:29 )  PTT:32.8 sec    CAPILLARY BLOOD GLUCOSE          REVIEW OF SYSTEMS:  CONSTITUTIONAL: No fever, weight loss, or fatigue  EYES: No eye pain, visual disturbances, or discharge  ENMT:  No difficulty hearing, tinnitus, vertigo; No sinus or throat pain  NECK: No pain or stiffness  RESPIRATORY: No cough, wheezing, chills or hemoptysis; No shortness of breath  CARDIOVASCULAR: No chest pain, palpitations, dizziness, or leg swelling  GASTROINTESTINAL: No abdominal or epigastric pain. No nausea, vomiting, or hematemesis; No diarrhea or constipation. No melena or hematochezia.  GENITOURINARY: No dysuria, frequency, hematuria, or incontinence  NEUROLOGICAL: No headaches, memory loss, loss of strength, numbness, or tremors  SKIN: No itching, burning, rashes, or lesions   LYMPH NODES: No enlarged glands  ENDOCRINE: No heat or cold intolerance; No hair loss  MUSCULOSKELETAL: No joint pain or swelling; No muscle, back, or extremity pain  PSYCHIATRIC:  depression by history, anxiety, No mood swings, or difficulty sleeping  HEME/LYMPH: h/o CLL  ALLERY AND IMMUNOLOGIC: No hives or eczema    RADIOLOGY & ADDITIONAL TESTS:    Imaging Personally Reviewed:  [ ] YES  [ x] NO    Consultant(s) Notes Reviewed:  [x ] YES  [ ] NO    PHYSICAL EXAM:  GENERAL: NAD, well-groomed, well-developed  HEAD:  Atraumatic, Normocephalic  EYES: EOMI, PERRLA, conjunctiva and sclera clear  ENMT: No tonsillar erythema, exudates, or enlargement; Moist mucous membranes, Good dentition, No lesions  NECK: Supple, No JVD, Normal thyroid  NERVOUS SYSTEM:  Alert & Oriented X3, Good concentration; Motor Strength 5/5 B/L upper and lower extremities; DTRs 2+ intact and symmetric  CHEST/LUNG: Clear to percussion bilaterally; No rales, rhonchi, wheezing, or rubs  HEART: Regular rate and rhythm; No murmurs, rubs, or gallops  ABDOMEN: Soft, Nontender, Nondistended; Bowel sounds present  EXTREMITIES:  s/p left ORIF ,2+ Peripheral Pulses, No clubbing, cyanosis, or edema  LYMPH: No lymphadenopathy noted  SKIN: No rashes or lesions    Care Discussed with Consultants/Other Providers [x ] YES  [ ] NO    A/P s/p left hip ORIF, OA, HTN, CLL, Dementia with depression, h/o right hip and wrist fracture in past.    Pain management, PT, IV ancef  Leucocytosis sec to CLL.

## 2017-05-12 NOTE — CONSULT NOTE ADULT - ASSESSMENT
89yo woman with Alzheimers Dementia with aspiration PNA causing AMS and toxic metabolic encephalopathy.      -supportive care   -tx Infection   -correct metabolic disturbances   -no neurologic intervention   -Neuro to sign off, re-consult if any questions

## 2017-05-12 NOTE — CONSULT NOTE ADULT - SUBJECTIVE AND OBJECTIVE BOX
Reason for consult: Code Stroke     HPI:  89 yo F from home lives alone with HHA 7 days and 24 hrs, ambulated with a walker and AO x 1-2 (person and place). PMH CLL, HTN, depression, R hip + femur and wrist fx (s/p repair in 2012), PNA (2016 admitted at [place]), macular degeneration and Alzheimer's dementia a/w L hip fx POD 1 left IM femoral nailing and RRT this afternoonfor unresponsiveness lasting minutes. At baseline pt is awake but confused often moans. Last seen at baseline 10 minutes before the rapid response when she was being fed by her health aid. Shortly thereafter pt could not be aroused.  Initially RRT called but then was converted to code stroke and Dr. Juárez evaluated the patient. while MD evaluating pt was resiting exam with good strenght in all 4 ext, activwely resisted passive eye opening and started moaning and responding at which point nursing reported pt was nearly back to baseline.  CT head was done showing only volume loss and microvascular dz with intracranila atherosclerosis but no acute pathology.  Pt was not felt to have a CVA so tpA was not recommended.  Blood work was done, ABG within normal limits. EKG NSR with LVH.  Tonight pt began spiking fevers with CXR showing multiple new opacities c/w aspiration PNA.      ROS: unable to obtain     PmHx:    Alzheimer's Dementia  CLL  HTN  MDD   PNA   macular degen      MEDICATIONS  (STANDING):  enoxaparin Injectable 30milliGRAM(s) SubCutaneous every 12 hours  docusate sodium 100milliGRAM(s) Oral three times a day  ferrous    sulfate 325milliGRAM(s) Oral three times a day with meals  folic acid 1milliGRAM(s) Oral daily  multivitamin 1Tablet(s) Oral daily  ascorbic acid 500milliGRAM(s) Oral two times a day  metoprolol succinate ER 25milliGRAM(s) Oral daily  mirtazapine 15milliGRAM(s) Oral at bedtime  dextrose 5% + sodium chloride 0.9%. 1000milliLiter(s) IV Continuous <Continuous>  piperacillin/tazobactam IVPB. 3.375Gram(s) IV Intermittent once  piperacillin/tazobactam IVPB. 3.375Gram(s) IV Intermittent every 8 hours    MEDICATIONS  (PRN):  acetaminophen   Tablet 650milliGRAM(s) Oral every 6 hours PRN For Temp over 38.3 C (100.94 F)  aluminum hydroxide/magnesium hydroxide/simethicone Suspension 30milliLiter(s) Oral four times a day PRN Indigestion  ondansetron Injectable 4milliGRAM(s) IV Push every 6 hours PRN Nausea and/or Vomiting  oxyCODONE  5 mG/acetaminophen 325 mG 1Tablet(s) Oral every 4 hours PRN Moderate Pain (4 - 6)  oxyCODONE  5 mG/acetaminophen 325 mG 2Tablet(s) Oral every 6 hours PRN Severe Pain (7 - 10)    ScHx:   no toxic, dom with HHA     FmHx:  non-contributory     Vital Signs Last 24 Hrs  T(C): 38.8, Max: 38.8 (05-12 @ 20:06)  T(F): 101.8, Max: 101.8 (05-12 @ 20:06)  HR: 97 (70 - 101)  BP: 114/36 (90/48 - 160/57)  BP(mean): --  RR: 20 (15 - 20)  SpO2: 99% (95% - 100%)  Physical Exam:  General: chronically ill appearing, frail, pale  MS: lethargic not following commands but also resisting passive exam   CN: PERRL, EOMI grossly, VFF to threat, V1-V3 b/l Intact, face symmetric, tongue midline  Motor: at least 4+/5 power throughout and symmetric, marked paratonia   Sensory: sensation grossly intact to pain throughout   Coordination: no dysmetria   DTR: 2+, toes down  Gait: deferred     CBC Full  -  ( 12 May 2017 14:18 )  WBC Count : 29.8 K/uL  Hemoglobin : 9.9 g/dL  Hematocrit : 30.4 %  Platelet Count - Automated : 106 K/uL  Mean Cell Volume : 97.4 fl  Mean Cell Hemoglobin : 31.7 pg  Mean Cell Hemoglobin Concentration : 32.6 gm/dL  Auto Neutrophil # : 7.7 K/uL  Auto Lymphocyte # : 20.6 K/uL  Auto Monocyte # : 1.2 K/uL  Auto Eosinophil # : 0.0 K/uL  Auto Basophil # : 0.3 K/uL  Auto Neutrophil % : 25.9 %  Auto Lymphocyte % : 69.1 %  Auto Monocyte % : 4.0 %  Auto Eosinophil % : 0.0 %  Auto Basophil % : 1.0 %    05-12    144  |  110<H>  |  24<H>  ----------------------------<  162<H>  4.4   |  27  |  1.84<H>    Ca    7.8<L>      12 May 2017 14:18  Phos  3.1     05-12  Mg     1.8     05-12    TPro  5.1<L>  /  Alb  2.7<L>  /  TBili  0.3  /  DBili  x   /  AST  31  /  ALT  14  /  AlkPhos  61  05-12 05-12 Chol 118 LDL 66 HDL 43 Trig 47    Imaging:  CT head stroke protocol 5/12/17  FINDINGS:  There is no evidence of acute intracranial hemorrhage, mass effect or   midline shift. No CT evidence of acute large territory vascular infarct.   The ventricles and cortical sulci are prominent reflecting parenchymal   volume loss. Patchy hypodensities in the periventricular white matter are   nonspecific, but likely sequela of small vessel ischemic disease.   Intracranial atherosclerotic calcifications are present.    The visualized paranasal sinuses and mastoid air cells are well aerated.   The native ocular lenses are surgically absent.    IMPRESSION:   No acute intracranial hemorrhage, mass effect or midline shift. MRI may   be performed for further evaluation as clinically indicated.    CXR 1 view portable 5/12/17   FINDINGS:  There is mild diffuse interstitial prominence and scattered airspace   opacities, most prominent in the right to mid to lower lung. Findings may   be due to edema or infection.  No pneumothorax or pleural effusion.   The cardiac silhouette is not accurately assessed by AP technique. Aortic   calcifications.    IMPRESSION:  Mild diffuse interstitial prominence and scattered airspace opacities,   most prominent in the right to mid to lower lung. Findings may be due to   edema or infection.

## 2017-05-12 NOTE — PROGRESS NOTE ADULT - SUBJECTIVE AND OBJECTIVE BOX
89yFemale    Diagnosis:  S/p L Hip IM Nailing POD# 1    Patient was seen and evaluated at bedside. Patient with no acute complaints.   Pain is well controlled..  Denies CP/SOB, dyspnea, paresthesias, N/V/D, palpitations. Voiding regularly     Vital Signs Last 24 Hrs  T(C): 36.3, Max: 36.9 (05-12 @ 00:21)  T(F): 97.4, Max: 98.4 (05-12 @ 00:21)  HR: 86 (68 - 91)  BP: 130/84 (109/52 - 152/104)  BP(mean): --  RR: 18 (17 - 23)  SpO2: 97% (91% - 100%)  I&O's Detail    I & Os for current day (as of 12 May 2017 08:54)  =============================================  IN:    Lactated Ringers IV Bolus: 900 ml    Total IN: 900 ml  ---------------------------------------------  OUT:    Estimated Blood Loss: 150 ml    Total OUT: 150 ml  ---------------------------------------------  Total NET: 750 ml      Physical Exam:    General: AAOx3, NAD, resting comfortably in bed.    L Hip:  Dressing is C/D/I. Skin is pink and warm. Staples intact. No erythema. SILT.  Wound with no drainage, healing well.   Lower extremity:  No calf tenderness, calves are soft B/l. 2+pulses. NVI. 5/5 Strength of EHL/TA/gastrocnemius B/L.  Good capillary refill. Warm, well-perfused.                           10.6   26.6  )-----------( 116      ( 12 May 2017 06:31 )             32.3     05-12    145  |  111<H>  |  20<H>  ----------------------------<  130<H>  5.2   |  23  |  1.46<H>    Ca    7.9<L>      12 May 2017 06:31  Phos  3.1     05-12  Mg     1.6     05-12    TPro  5.4<L>  /  Alb  2.6<L>  /  TBili  0.6  /  DBili  x   /  AST  35  /  ALT  18  /  AlkPhos  69  05-12      Impression:  89yFemale S/p L Hip IM Nailing POD# 1  Plan:  -  Pain management  -  Dvt prophylaxis  -  Daily Physical Theapy:  WBAT of the L lower extremity  -  Discharge planning: Rehab pending Physical therapy eval.  -  Continue with Post-op Antibiotics x 24hrs  -  Discontinue Cunha catheter today  -  Case d/w DR. Parsons

## 2017-05-12 NOTE — CHART NOTE - NSCHARTNOTEFT_GEN_A_CORE
PGY 1 on NF, patient was spiking fevers, CXR showed multiple opacities with more pronounced on right side , concern for aspiration. Discussed with PGY 3 on call , will give zosyn for now. Will endorse it to next team.

## 2017-05-12 NOTE — STROKE CODE NOTE - NIH STROKE SCALE: 1A. LEVEL OF CONSCIOUSNESS, QM
(2) Not alert; requires repeated stimulation to attend, or is obtunded and requires strong or painful stimulation to make movements (not stereotyped)

## 2017-05-12 NOTE — PROGRESS NOTE ADULT - ASSESSMENT
89 yo F from home lives alone with HHA 7 days and 24 hrs, ambulated with a walker and AO x 2 (person and place). PMH CLL, HTN, depression, R hip + femur and wrist fx (s/p repair in 2012), PNA (2016 admitted at [place]), macular degeneration and Alzheimer's dementia. Pt was walking to the bathroom with her walker with assistance from her HHA. HHA was able to catch her and guide her gently to the floor. Pt landed on left hip, pt complaining of left hip pain exacerbated by movement and touch.

## 2017-05-12 NOTE — PHYSICAL THERAPY INITIAL EVALUATION ADULT - GENERAL OBSERVATIONS, REHAB EVAL
patient received in bed. 24-7 HHA is at bedside. I/V and Venodyne boot in place ,Left hip bandage is CDI

## 2017-05-12 NOTE — PHYSICAL THERAPY INITIAL EVALUATION ADULT - CRITERIA FOR SKILLED THERAPEUTIC INTERVENTIONS
anticipated discharge recommendation/rehab potential/impairments found/predicted duration of therapy intervention/therapy frequency

## 2017-05-13 LAB
ANION GAP SERPL CALC-SCNC: 9 MMOL/L — SIGNIFICANT CHANGE UP (ref 5–17)
BASOPHILS # BLD AUTO: 0.2 K/UL — SIGNIFICANT CHANGE UP (ref 0–0.2)
BASOPHILS NFR BLD AUTO: 0.9 % — SIGNIFICANT CHANGE UP (ref 0–2)
BUN SERPL-MCNC: 24 MG/DL — HIGH (ref 7–18)
CALCIUM SERPL-MCNC: 8.1 MG/DL — LOW (ref 8.4–10.5)
CHLORIDE SERPL-SCNC: 113 MMOL/L — HIGH (ref 96–108)
CK MB BLD-MCNC: 0.6 % — SIGNIFICANT CHANGE UP (ref 0–3.5)
CK MB CFR SERPL CALC: 1.2 NG/ML — SIGNIFICANT CHANGE UP (ref 0–3.6)
CK SERPL-CCNC: 218 U/L — HIGH (ref 21–215)
CO2 SERPL-SCNC: 22 MMOL/L — SIGNIFICANT CHANGE UP (ref 22–31)
CREAT SERPL-MCNC: 1.52 MG/DL — HIGH (ref 0.5–1.3)
EOSINOPHIL # BLD AUTO: 0.1 K/UL — SIGNIFICANT CHANGE UP (ref 0–0.5)
EOSINOPHIL NFR BLD AUTO: 0.2 % — SIGNIFICANT CHANGE UP (ref 0–6)
GLUCOSE SERPL-MCNC: 112 MG/DL — HIGH (ref 70–99)
HCT VFR BLD CALC: 26.6 % — LOW (ref 34.5–45)
HCT VFR BLD CALC: 28.6 % — LOW (ref 34.5–45)
HGB BLD-MCNC: 8.5 G/DL — LOW (ref 11.5–15.5)
HGB BLD-MCNC: 9.2 G/DL — LOW (ref 11.5–15.5)
LYMPHOCYTES # BLD AUTO: 15 K/UL — HIGH (ref 1–3.3)
LYMPHOCYTES # BLD AUTO: 61 % — HIGH (ref 13–44)
MCHC RBC-ENTMCNC: 31 PG — SIGNIFICANT CHANGE UP (ref 27–34)
MCHC RBC-ENTMCNC: 31.5 PG — SIGNIFICANT CHANGE UP (ref 27–34)
MCHC RBC-ENTMCNC: 32 GM/DL — SIGNIFICANT CHANGE UP (ref 32–36)
MCHC RBC-ENTMCNC: 32.1 GM/DL — SIGNIFICANT CHANGE UP (ref 32–36)
MCV RBC AUTO: 96.6 FL — SIGNIFICANT CHANGE UP (ref 80–100)
MCV RBC AUTO: 98.5 FL — SIGNIFICANT CHANGE UP (ref 80–100)
MONOCYTES # BLD AUTO: 1.5 K/UL — HIGH (ref 0–0.9)
MONOCYTES NFR BLD AUTO: 6.2 % — SIGNIFICANT CHANGE UP (ref 2–14)
NEUTROPHILS # BLD AUTO: 7.8 K/UL — HIGH (ref 1.8–7.4)
NEUTROPHILS NFR BLD AUTO: 31.6 % — LOW (ref 43–77)
PLATELET # BLD AUTO: 91 K/UL — LOW (ref 150–400)
PLATELET # BLD AUTO: 92 K/UL — LOW (ref 150–400)
POTASSIUM SERPL-MCNC: 4.3 MMOL/L — SIGNIFICANT CHANGE UP (ref 3.5–5.3)
POTASSIUM SERPL-SCNC: 4.3 MMOL/L — SIGNIFICANT CHANGE UP (ref 3.5–5.3)
RBC # BLD: 2.7 M/UL — LOW (ref 3.8–5.2)
RBC # BLD: 2.96 M/UL — LOW (ref 3.8–5.2)
RBC # FLD: 12.3 % — SIGNIFICANT CHANGE UP (ref 10.3–14.5)
RBC # FLD: 12.6 % — SIGNIFICANT CHANGE UP (ref 10.3–14.5)
SODIUM SERPL-SCNC: 144 MMOL/L — SIGNIFICANT CHANGE UP (ref 135–145)
TROPONIN I SERPL-MCNC: 0.04 NG/ML — SIGNIFICANT CHANGE UP (ref 0–0.04)
WBC # BLD: 24.5 K/UL — HIGH (ref 3.8–10.5)
WBC # BLD: 30.2 K/UL — HIGH (ref 3.8–10.5)
WBC # FLD AUTO: 24.5 K/UL — HIGH (ref 3.8–10.5)
WBC # FLD AUTO: 30.2 K/UL — HIGH (ref 3.8–10.5)

## 2017-05-13 RX ORDER — VANCOMYCIN HCL 1 G
1000 VIAL (EA) INTRAVENOUS ONCE
Qty: 0 | Refills: 0 | Status: COMPLETED | OUTPATIENT
Start: 2017-05-13 | End: 2017-05-13

## 2017-05-13 RX ADMIN — PIPERACILLIN AND TAZOBACTAM 25 GRAM(S): 4; .5 INJECTION, POWDER, LYOPHILIZED, FOR SOLUTION INTRAVENOUS at 13:20

## 2017-05-13 RX ADMIN — ENOXAPARIN SODIUM 30 MILLIGRAM(S): 100 INJECTION SUBCUTANEOUS at 05:30

## 2017-05-13 RX ADMIN — Medication 250 MILLIGRAM(S): at 17:52

## 2017-05-13 RX ADMIN — PIPERACILLIN AND TAZOBACTAM 25 GRAM(S): 4; .5 INJECTION, POWDER, LYOPHILIZED, FOR SOLUTION INTRAVENOUS at 05:30

## 2017-05-13 RX ADMIN — PIPERACILLIN AND TAZOBACTAM 25 GRAM(S): 4; .5 INJECTION, POWDER, LYOPHILIZED, FOR SOLUTION INTRAVENOUS at 21:37

## 2017-05-13 RX ADMIN — ENOXAPARIN SODIUM 30 MILLIGRAM(S): 100 INJECTION SUBCUTANEOUS at 17:53

## 2017-05-13 RX ADMIN — Medication 650 MILLIGRAM(S): at 21:38

## 2017-05-13 RX ADMIN — Medication 650 MILLIGRAM(S): at 18:06

## 2017-05-13 NOTE — CONSULT NOTE ADULT - SUBJECTIVE AND OBJECTIVE BOX
A 89 yo F from home lives alone with HHA 7 days and 24 hrs, ambulated with a walker,  R hip + femur and wrist fx (s/p repair in ), CLL and Alzheimer's dementia brought in to the ER for evaluation of a fall, landed on left hip. She found to have Left hip fracture underwent Im nailing .The post-op course complicated with RRT due to unresponsiveness and found to have fever, T102, multiple right upper and lower lobe opacities, most likely Aspiration pneumonia. She has started on zosyn and ID consult requested to assist with further evaluation and antibiotic management.          REVIEW OF SYSTEMS: Total of twelve systems have been reviewed and found to be negative unless mentioned in HPI        PAST MEDICAL & SURGICAL HISTORY:  UTI (urinary tract infection)  Dementia  Hypertension  Depression  HTN (hypertension)  CLL (chronic lymphocytic leukemia)  Femur fracture, right      SOCIAL HISTORY  Alcohol: Does not drink  Tobacco: Does not smoke  No Illicit substance use        FAMILY HISTORY: Non contributory to the present illness          MEDICATIONS  (STANDING):  enoxaparin Injectable 30milliGRAM(s) SubCutaneous every 12 hours  docusate sodium 100milliGRAM(s) Oral three times a day  ferrous    sulfate 325milliGRAM(s) Oral three times a day with meals  folic acid 1milliGRAM(s) Oral daily  multivitamin 1Tablet(s) Oral daily  ascorbic acid 500milliGRAM(s) Oral two times a day  metoprolol succinate ER 25milliGRAM(s) Oral daily  mirtazapine 15milliGRAM(s) Oral at bedtime  dextrose 5% + sodium chloride 0.9%. 1000milliLiter(s) IV Continuous <Continuous>  piperacillin/tazobactam IVPB. 3.375Gram(s) IV Intermittent every 8 hours        Vital Signs Last 24 Hrs  T(C): 36.8, Max: 38.8 (05-12 @ 20:06)  T(F): 98.2, Max: 101.8 (05-12 @ 20:06)  HR: 86 (78 - 101)  BP: 120/45 (94/32 - 160/57)  BP(mean): --  RR: 17 (15 - 20)  SpO2: 96% (95% - 99%)      PHYSICAL EXAM:    GENERAL: No tin distress  CVS: s1 and s2 present  RESP: Air entry B/L  GI: abdomen soft and nontender  EXT: Left hip  CNS: Awake and alert        LABS:                        9.2    30.2  )-----------( 91       ( 13 May 2017 08:05 )             28.6     05-13    144  |  113<H>  |  24<H>  ----------------------------<  112<H>  4.3   |  22  |  1.52<H>    Ca    8.1<L>      13 May 2017 08:05  Phos  3.1       Mg     1.8         TPro  5.1<L>  /  Alb  2.7<L>  /  TBili  0.3  /  DBili  x   /  AST  31  /  ALT  14  /  AlkPhos  61  12      Urinalysis Basic - ( 12 May 2017 15:36 )    Color: Yellow / Appearance: Clear / S.015 / pH: x  Gluc: x / Ketone: Negative  / Bili: Negative / Urobili: Negative   Blood: x / Protein: 30 mg/dL / Nitrite: Negative   Leuk Esterase: Negative / RBC: 0-2 /HPF / WBC 0-2 /HPF   Sq Epi: x / Non Sq Epi: Occasional / Bacteria: x      CAPILLARY BLOOD GLUCOSE    ABG - ( 12 May 2017 14:19 )  pH: 7.40  /  pCO2: 37    /  pO2: 79    / HCO3: 22    / Base Excess: -1.8  /  SaO2: 96          RADIOLOGY:      CXR:  2017  IMPRESSION: Mild diffuse interstitial prominence and scattered airspace opacities, most prominent in the right to mid to lower lung. Findings may be due to  edema or infection.       2017    CT HEAD WITHOUT CONTRAST:  No acute intracranial hemorrhage, mass effect or midline shift. MRI may  be performed for further evaluation as clinically indicated.      MICROBIOLOGY DATA:    Urine Microscopic-Add On (NC) (17 @ 15:36)    Epithelial Cells: Occasional    White Blood Cell - Urine: 0-2 /HPF    Red Blood Cell - Urine: 0-2 /HPF    17 Blood culture: Pending A 89 yo F from home lives alone with HHA 7 days and 24 hrs, ambulated with a walker,  R hip + femur and wrist fx (s/p repair in ), CLL and Alzheimer's dementia brought in to the ER for evaluation of a fall, landed on left hip. She found to have Left hip fracture underwent Im nailing .The post-op course complicated with RRT due to unresponsiveness and found to have fever, T102, multiple right upper and lower lobe opacities, most likely Aspiration pneumonia. She has started on zosyn and ID consult requested to assist with further evaluation and antibiotic management.          REVIEW OF SYSTEMS: Total of twelve systems have been reviewed and found to be negative unless mentioned in HPI        PAST MEDICAL & SURGICAL HISTORY:  UTI (urinary tract infection)  Dementia  Hypertension  Depression  HTN (hypertension)  CLL (chronic lymphocytic leukemia)  Femur fracture, right      SOCIAL HISTORY  Alcohol: Does not drink  Tobacco: Does not smoke  No Illicit substance use        FAMILY HISTORY: Non contributory to the present illness          MEDICATIONS  (STANDING):  enoxaparin Injectable 30milliGRAM(s) SubCutaneous every 12 hours  docusate sodium 100milliGRAM(s) Oral three times a day  ferrous    sulfate 325milliGRAM(s) Oral three times a day with meals  folic acid 1milliGRAM(s) Oral daily  multivitamin 1Tablet(s) Oral daily  ascorbic acid 500milliGRAM(s) Oral two times a day  metoprolol succinate ER 25milliGRAM(s) Oral daily  mirtazapine 15milliGRAM(s) Oral at bedtime  dextrose 5% + sodium chloride 0.9%. 1000milliLiter(s) IV Continuous <Continuous>  piperacillin/tazobactam IVPB. 3.375Gram(s) IV Intermittent every 8 hours        Vital Signs Last 24 Hrs  T(C): 36.8, Max: 38.8 (05-12 @ 20:06)  T(F): 98.2, Max: 101.8 (05-12 @ 20:06)  HR: 86 (78 - 101)  BP: 120/45 (94/32 - 160/57)  BP(mean): --  RR: 17 (15 - 20)  SpO2: 96% (95% - 99%)      PHYSICAL EXAM:    GENERAL: No tin distress  CVS: s1 and s2 present  RESP: Air entry B/L  GI: abdomen soft and nontender  EXT: Left hip incision site has bandage in placed  CNS: Awake and alert        LABS:                        9.2    30.2  )-----------( 91       ( 13 May 2017 08:05 )             28.6     05-    144  |  113<H>  |  24<H>  ----------------------------<  112<H>  4.3   |  22  |  1.52<H>    Ca    8.1<L>      13 May 2017 08:05  Phos  3.1       Mg     1.8         TPro  5.1<L>  /  Alb  2.7<L>  /  TBili  0.3  /  DBili  x   /  AST  31  /  ALT  14  /  AlkPhos  61        Urinalysis Basic - ( 12 May 2017 15:36 )    Color: Yellow / Appearance: Clear / S.015 / pH: x  Gluc: x / Ketone: Negative  / Bili: Negative / Urobili: Negative   Blood: x / Protein: 30 mg/dL / Nitrite: Negative   Leuk Esterase: Negative / RBC: 0-2 /HPF / WBC 0-2 /HPF   Sq Epi: x / Non Sq Epi: Occasional / Bacteria: x      CAPILLARY BLOOD GLUCOSE    ABG - ( 12 May 2017 14:19 )  pH: 7.40  /  pCO2: 37    /  pO2: 79    / HCO3: 22    / Base Excess: -1.8  /  SaO2: 96          RADIOLOGY:      CXR:  2017  IMPRESSION: Mild diffuse interstitial prominence and scattered airspace opacities, most prominent in the right to mid to lower lung. Findings may be due to  edema or infection.       2017    CT HEAD WITHOUT CONTRAST:  No acute intracranial hemorrhage, mass effect or midline shift. MRI may  be performed for further evaluation as clinically indicated.      MICROBIOLOGY DATA:    Urine Microscopic-Add On (NC) (17 @ 15:36)    Epithelial Cells: Occasional    White Blood Cell - Urine: 0-2 /HPF    Red Blood Cell - Urine: 0-2 /HPF    17 Blood culture: Pending A 89 yo F from home lives alone with HHA 7 days and 24 hrs, ambulated with a walker,  R hip + femur and wrist fx (s/p repair in ), CLL and Alzheimer's dementia brought in to the ER for evaluation of a fall, landed on left hip. She found to have Left hip fracture underwent Im nailing .The post-op course complicated with RRT due to unresponsiveness and found to have fever, T102, multiple right upper and lower lobe opacities, most likely Aspiration pneumonia. She has started on zosyn and ID consult requested to assist with further evaluation and antibiotic management.          REVIEW OF SYSTEMS: Total of twelve systems have been reviewed and found to be negative unless mentioned in HPI        PAST MEDICAL & SURGICAL HISTORY:  UTI (urinary tract infection)  Dementia  Hypertension  Depression  HTN (hypertension)  CLL (chronic lymphocytic leukemia)  Femur fracture, right      SOCIAL HISTORY  Alcohol: Does not drink  Tobacco: Does not smoke  No Illicit substance use        FAMILY HISTORY: Non contributory to the present illness          MEDICATIONS  (STANDING):  enoxaparin Injectable 30milliGRAM(s) SubCutaneous every 12 hours  docusate sodium 100milliGRAM(s) Oral three times a day  ferrous    sulfate 325milliGRAM(s) Oral three times a day with meals  folic acid 1milliGRAM(s) Oral daily  multivitamin 1Tablet(s) Oral daily  ascorbic acid 500milliGRAM(s) Oral two times a day  metoprolol succinate ER 25milliGRAM(s) Oral daily  mirtazapine 15milliGRAM(s) Oral at bedtime  dextrose 5% + sodium chloride 0.9%. 1000milliLiter(s) IV Continuous <Continuous>  piperacillin/tazobactam IVPB. 3.375Gram(s) IV Intermittent every 8 hours        Vital Signs Last 24 Hrs  T(C): 36.8, Max: 38.8 (05-12 @ 20:06)  T(F): 98.2, Max: 101.8 (05-12 @ 20:06)  HR: 86 (78 - 101)  BP: 120/45 (94/32 - 160/57)  BP(mean): --  RR: 17 (15 - 20)  SpO2: 96% (95% - 99%)      PHYSICAL EXAM:    GENERAL: Not in distress  CVS: s1 and s2 present  RESP: Air entry B/L  GI: abdomen soft and nontender  EXT: Left hip incision site has bandage in placed  CNS: lethargic         LABS:                        9.2    30.2  )-----------( 91       ( 13 May 2017 08:05 )             28.6     05-    144  |  113<H>  |  24<H>  ----------------------------<  112<H>  4.3   |  22  |  1.52<H>    Ca    8.1<L>      13 May 2017 08:05  Phos  3.1       Mg     1.8         TPro  5.1<L>  /  Alb  2.7<L>  /  TBili  0.3  /  DBili  x   /  AST  31  /  ALT  14  /  AlkPhos  61        Urinalysis Basic - ( 12 May 2017 15:36 )    Color: Yellow / Appearance: Clear / S.015 / pH: x  Gluc: x / Ketone: Negative  / Bili: Negative / Urobili: Negative   Blood: x / Protein: 30 mg/dL / Nitrite: Negative   Leuk Esterase: Negative / RBC: 0-2 /HPF / WBC 0-2 /HPF   Sq Epi: x / Non Sq Epi: Occasional / Bacteria: x      CAPILLARY BLOOD GLUCOSE    ABG - ( 12 May 2017 14:19 )  pH: 7.40  /  pCO2: 37    /  pO2: 79    / HCO3: 22    / Base Excess: -1.8  /  SaO2: 96          RADIOLOGY:      CXR:  2017  IMPRESSION: Mild diffuse interstitial prominence and scattered airspace opacities, most prominent in the right to mid to lower lung. Findings may be due to  edema or infection.       2017    CT HEAD WITHOUT CONTRAST:  No acute intracranial hemorrhage, mass effect or midline shift. MRI may  be performed for further evaluation as clinically indicated.      MICROBIOLOGY DATA:    Urine Microscopic-Add On (NC) (17 @ 15:36)    Epithelial Cells: Occasional    White Blood Cell - Urine: 0-2 /HPF    Red Blood Cell - Urine: 0-2 /HPF    17 Blood culture: Pending

## 2017-05-13 NOTE — CONSULT NOTE ADULT - SUBJECTIVE AND OBJECTIVE BOX
89 year old lady with CLL, dementia, fell at home and fracture her left hip.  She had ORIF done.  the platelet dropped from 160 to 90.  there is no active bleeding.  she is awake.  no palpable node at neck.  chest is clear.  abd is  soft.  no swelling at legs.                      9.2    30.2  )-----------( 91       ( 13 May 2017 08:05 )             28.6   05-13    144  |  113<H>  |  24<H>  ----------------------------<  112<H>  4.3   |  22  |  1.52<H>    Ca    8.1<L>      13 May 2017 08:05  Phos  3.1     05-12  Mg     1.8     05-12PROCEDURE DATE:  05/11/2017        INTERPRETATION:  Left femur. Patient has a known left hip fracture.    Superior intratrochanteric fracture with angulation deformity again   noted. There is also foreshortening at the fracture site.    Left hip appears free of degeneration.    There is significant knee degeneration and bone-on-bone articulation of   the medial compartment.    There is no bony destructive lesion identified. Arterial calcifications   are noted.    IMPRESSION: Left hip fracture are again noted. Significant knee   degeneration.              KATIA VILLATORO M.D., ATTENDING RADIOLOGIST  This document has been electronically signed. May 11 2017  1:27PM    TPro  5.1<L>  /  Alb  2.7<L>  /  TBili  0.3  /  DBili  x   /  AST  31  /  ALT  14  /  AlkPhos  61  05-12  Vital Signs Last 24 Hrs  T(C): 36.8, Max: 38.8 (05-12 @ 20:06)  T(F): 98.2, Max: 101.8 (05-12 @ 20:06)  HR: 86 (78 - 101)  BP: 120/45 (94/32 - 160/57)  BP(mean): --  RR: 17 (15 - 20)  SpO2: 96% (95% - 99%)    thrombocytopenia after surgery  it can be due to dilutional from IV hydration, consumption from surgery.  but she runs fever now.  sepsis can be the cause of decreased platelet.  needs ID

## 2017-05-13 NOTE — CHART NOTE - NSCHARTNOTEFT_GEN_A_CORE
Resident was paged by RN because family was at bedside requested update of care from physician as well as questions addressed.     Spoke to patient's daughter and son at bedside in the presence of the patient's aid. Discussed the findings on chest x ray and the concern for aspiration pneumonia. Discussed current plan of care and overall prognosis. Updated family on recommendations of primary team and following consultants. Answered all questions regarding to patient's care. Discussed the plan for physical therapy in the hospital.     Patient has been showing signs of dysphagia as reported by the RN and the family. Will plan to obtain speech and swallow. Changed diet order to NPO. Continue with IV fluid hydration.     RN was notified regarding current plan of care. Will update chart and family with any further discussions or changes in care.    Notified medical attending.

## 2017-05-13 NOTE — CONSULT NOTE ADULT - ASSESSMENT
Patient is a 89y old  Female who presents with a chief complaint of falls and found to have Left hip fracture s/p IM left femoral nailing. the post-op course complicated with RRT due to unresponsiveness and found to have fever, T102, multiple right upper and lower lobe opacities, most likely Aspiration pneumonia.    # Left hip fracture- s/p Im nailing  # s/p RRT due to unresponsiveness  # Aspiration pneumonia    Would recommend:  1. Follow up blood cultures  2. Aspiration Precaution  3. Continue Zosyn until work up is done  4. Monitor WBC count  5. Left hip incision site care as per Ortho    d/w Nursing staff  -will follow the treatment response with you  Thank you for consulting  Covering Dr. Dang Patient is a 89y old  Female who presents with a chief complaint of falls and found to have Left hip fracture s/p IM left femoral nailing. the post-op course complicated with RRT due to unresponsiveness and found to have fever, T102, multiple right upper and lower lobe opacities, most likely Aspiration pneumonia.    # Left hip fracture- s/p Im nailing  # s/p RRT due to unresponsiveness  # Aspiration pneumonia  # Sepsis - Fever and leukocytosis    Would recommend:  1. Follow up blood cultures  2. Aspiration Precaution  3. Continue Zosyn until work up is done and dose of Vancomycin  4. Monitor WBC count  5. Left hip incision site care as per Ortho  6. NPO and obtain speech and swallow evaluation  7. Monitor Temp. and continue supportive care      d/w Nursing staff and HHA at the bed side  -will follow the treatment response with you  Thank you for consulting    Covering Dr. Dang

## 2017-05-13 NOTE — PROGRESS NOTE ADULT - SUBJECTIVE AND OBJECTIVE BOX
89yFemale    Diagnosis:  S/p Left Hip IM Nailing POD#2    Patient was seen and evaluated at bedside. Patient with no acute complaints, but poor historian.  Pain is well controlled.  Denies CP/SOB, dyspnea, paresthesias, N/V/D, palpitations.     Vital Signs Last 24 Hrs  T(C): 36.7, Max: 38.8 (05-12 @ 20:06)  T(F): 98, Max: 101.8 (05-12 @ 20:06)  HR: 97 (78 - 101)  BP: 119/48 (94/32 - 160/57)  BP(mean): --  RR: 17 (15 - 20)  SpO2: 95% (95% - 99%)  I&O's Detail    I & Os for current day (as of 13 May 2017 11:36)  =============================================  IN:    dextrose 5% + sodium chloride 0.9%.: 1200 ml    Total IN: 1200 ml  ---------------------------------------------  OUT:    Indwelling Catheter - Urethral: 550 ml    Total OUT: 550 ml  ---------------------------------------------  Total NET: 650 ml      Physical Exam:    General: AAOx3, NAD, resting comfortably in bed.    Left Hip:  Dressing is C/D/I. Skin is pink and warm. Staples intact. No erythema. SILT.  Wound with no drainage, healing well.   Lower extremity:  No calf tenderness, calves are soft. 2+pulses. NVI. EHL/TA/gastrocnemius intact B/L.  Good capillary refill. Warm, well-perfused.                           9.2    30.2  )-----------( 91       ( 13 May 2017 08:05 )             28.6     05-13    144  |  113<H>  |  24<H>  ----------------------------<  112<H>  4.3   |  22  |  1.52<H>    Ca    8.1<L>      13 May 2017 08:05  Phos  3.1     05-12  Mg     1.8     05-12    TPro  5.1<L>  /  Alb  2.7<L>  /  TBili  0.3  /  DBili  x   /  AST  31  /  ALT  14  /  AlkPhos  61  05-12      Impression:  89yFemale S/p Left Hip IM Nailing POD#2  Plan:  -  Pain management  -  Dvt prophylaxis with Lovenox  -  Daily Physical Theapy:  WBAT of Left lower extremity  -  Discharge planning: Home Vs. Rehab pending Physical therapy eval.  -  Patient is orthopedically stable  -  D/c staples 5/26/17  -  Follow up with Dr. Parsons upon d/c from rehab at [phone]  -  Dressing Changed

## 2017-05-13 NOTE — PROGRESS NOTE ADULT - ASSESSMENT
s/p left hip surhry  on lovenox, on pain meds  ortho to f/u  wbc is high  no fever  has h/o CLL  on zosyn.  hgb on adm 14  now 9.4  watch hgb

## 2017-05-13 NOTE — PROGRESS NOTE ADULT - SUBJECTIVE AND OBJECTIVE BOX
CHIEF COMPLAINT:Patient is a 89y old  Female who presents with a chief complaint of falls (11 May 2017 11:38) had fx of left Hip  s/p surgery.   previously had rt femar sx 5 yrs ago  as per [facility] h/o dementia    	          REVIEW OF SYSTEMS:   cant be obtained pt has dementia  and in effect of medcineMedications:  MEDICATIONS  (STANDING):  enoxaparin Injectable 30milliGRAM(s) SubCutaneous every 12 hours  docusate sodium 100milliGRAM(s) Oral three times a day  ferrous    sulfate 325milliGRAM(s) Oral three times a day with meals  folic acid 1milliGRAM(s) Oral daily  multivitamin 1Tablet(s) Oral daily  ascorbic acid 500milliGRAM(s) Oral two times a day  metoprolol succinate ER 25milliGRAM(s) Oral daily  mirtazapine 15milliGRAM(s) Oral at bedtime  dextrose 5% + sodium chloride 0.9%. 1000milliLiter(s) IV Continuous <Continuous>  piperacillin/tazobactam IVPB. 3.375Gram(s) IV Intermittent every 8 hours    MEDICATIONS  (PRN):  acetaminophen   Tablet 650milliGRAM(s) Oral every 6 hours PRN For Temp over 38.3 C (100.94 F)  aluminum hydroxide/magnesium hydroxide/simethicone Suspension 30milliLiter(s) Oral four times a day PRN Indigestion  ondansetron Injectable 4milliGRAM(s) IV Push every 6 hours PRN Nausea and/or Vomiting  oxyCODONE  5 mG/acetaminophen 325 mG 1Tablet(s) Oral every 4 hours PRN Moderate Pain (4 - 6)  oxyCODONE  5 mG/acetaminophen 325 mG 2Tablet(s) Oral every 6 hours PRN Severe Pain (7 - 10)  acetaminophen  Suppository 650milliGRAM(s) Rectal every 4 hours PRN For Temp greater than 38 C (100.4 F)    	    PHYSICAL EXAM:  T(C): 36.8, Max: 38.8 (05-12 @ 20:06)  HR: 86 (78 - 101)  BP: 120/45 (94/32 - 160/57)  RR: 17 (15 - 20)  SpO2: 96% (95% - 99%)  Wt(kg): --  I&O's Summary    I & Os for current day (as of 13 May 2017 13:41)  =============================================  IN: 1200 ml / OUT: 550 ml / NET: 650 ml      Appearance: Normal	  HEENT:   Normal oral mucosa, PERRL, EOMI	  Lymphatic: No lymphadenopathy  Cardiovascular: Normal S1 S2, No JVD, No murmurs, No edema  Respiratory: Lungs clear to auscultation	  Psychiatry:confused holding hands  but no response on talking to her  Gastrointestinal:  Soft, Non-tender, + BS	  Skin: No rashes, No ecchymoses, No cyanosis	  Neurologic: Non-focal   Extremities: Normal range of motion, No clubbing, cyanosis or edema  Vascular: Peripheral pulses palpable 2+ bilaterally    TELEMETRY: 	    ECG:  	  RADIOLOGY:  OTHER: 	  	  LABS:	 	    CARDIAC MARKERS:  CARDIAC MARKERS ( 13 May 2017 08:05 )  0.035 ng/mL / x     / 218 U/L / x     / 1.2 ng/mL  CARDIAC MARKERS ( 12 May 2017 21:51 )  0.040 ng/mL / x     / 190 U/L / x     / <1.0 ng/mL  CARDIAC MARKERS ( 12 May 2017 14:18 )  0.043 ng/mL / x     / 186 U/L / x     / 1.8 ng/mL                                9.2    30.2  )-----------( 91       ( 13 May 2017 08:05 )             28.6     05-13    144  |  113<H>  |  24<H>  ----------------------------<  112<H>  4.3   |  22  |  1.52<H>    Ca    8.1<L>      13 May 2017 08:05  Phos  3.1     05-12  Mg     1.8     05-12    TPro  5.1<L>  /  Alb  2.7<L>  /  TBili  0.3  /  DBili  x   /  AST  31  /  ALT  14  /  AlkPhos  61  05-12    proBNP:   Lipid Profile:   HgA1c:   TSH:

## 2017-05-14 LAB
ANION GAP SERPL CALC-SCNC: 8 MMOL/L — SIGNIFICANT CHANGE UP (ref 5–17)
BASOPHILS # BLD AUTO: 0.2 K/UL — SIGNIFICANT CHANGE UP (ref 0–0.2)
BASOPHILS NFR BLD AUTO: 0.9 % — SIGNIFICANT CHANGE UP (ref 0–2)
BUN SERPL-MCNC: 16 MG/DL — SIGNIFICANT CHANGE UP (ref 7–18)
CALCIUM SERPL-MCNC: 7.7 MG/DL — LOW (ref 8.4–10.5)
CHLORIDE SERPL-SCNC: 117 MMOL/L — HIGH (ref 96–108)
CO2 SERPL-SCNC: 23 MMOL/L — SIGNIFICANT CHANGE UP (ref 22–31)
CREAT SERPL-MCNC: 1.14 MG/DL — SIGNIFICANT CHANGE UP (ref 0.5–1.3)
EOSINOPHIL # BLD AUTO: 0 K/UL — SIGNIFICANT CHANGE UP (ref 0–0.5)
EOSINOPHIL NFR BLD AUTO: 0.1 % — SIGNIFICANT CHANGE UP (ref 0–6)
GLUCOSE SERPL-MCNC: 125 MG/DL — HIGH (ref 70–99)
HCT VFR BLD CALC: 23.8 % — LOW (ref 34.5–45)
HGB BLD-MCNC: 7.7 G/DL — LOW (ref 11.5–15.5)
LYMPHOCYTES # BLD AUTO: 13.1 K/UL — HIGH (ref 1–3.3)
LYMPHOCYTES # BLD AUTO: 64.1 % — HIGH (ref 13–44)
MAGNESIUM SERPL-MCNC: 1.9 MG/DL — SIGNIFICANT CHANGE UP (ref 1.6–2.6)
MCHC RBC-ENTMCNC: 31.7 PG — SIGNIFICANT CHANGE UP (ref 27–34)
MCHC RBC-ENTMCNC: 32.5 GM/DL — SIGNIFICANT CHANGE UP (ref 32–36)
MCV RBC AUTO: 97.4 FL — SIGNIFICANT CHANGE UP (ref 80–100)
MONOCYTES # BLD AUTO: 1.2 K/UL — HIGH (ref 0–0.9)
MONOCYTES NFR BLD AUTO: 5.9 % — SIGNIFICANT CHANGE UP (ref 2–14)
NEUTROPHILS # BLD AUTO: 5.9 K/UL — SIGNIFICANT CHANGE UP (ref 1.8–7.4)
NEUTROPHILS NFR BLD AUTO: 29 % — LOW (ref 43–77)
PHOSPHATE SERPL-MCNC: 1.5 MG/DL — LOW (ref 2.5–4.5)
PLATELET # BLD AUTO: 85 K/UL — LOW (ref 150–400)
POTASSIUM SERPL-MCNC: 3.5 MMOL/L — SIGNIFICANT CHANGE UP (ref 3.5–5.3)
POTASSIUM SERPL-SCNC: 3.5 MMOL/L — SIGNIFICANT CHANGE UP (ref 3.5–5.3)
RBC # BLD: 2.44 M/UL — LOW (ref 3.8–5.2)
RBC # FLD: 12.6 % — SIGNIFICANT CHANGE UP (ref 10.3–14.5)
SODIUM SERPL-SCNC: 148 MMOL/L — HIGH (ref 135–145)
WBC # BLD: 20.4 K/UL — HIGH (ref 3.8–10.5)
WBC # FLD AUTO: 20.4 K/UL — HIGH (ref 3.8–10.5)

## 2017-05-14 PROCEDURE — 71010: CPT | Mod: 26

## 2017-05-14 RX ORDER — METOPROLOL TARTRATE 50 MG
5 TABLET ORAL EVERY 6 HOURS
Qty: 0 | Refills: 0 | Status: DISCONTINUED | OUTPATIENT
Start: 2017-05-14 | End: 2017-05-16

## 2017-05-14 RX ORDER — POTASSIUM CHLORIDE 20 MEQ
10 PACKET (EA) ORAL
Qty: 0 | Refills: 0 | Status: COMPLETED | OUTPATIENT
Start: 2017-05-14 | End: 2017-05-14

## 2017-05-14 RX ORDER — POTASSIUM PHOSPHATE, MONOBASIC POTASSIUM PHOSPHATE, DIBASIC 236; 224 MG/ML; MG/ML
15 INJECTION, SOLUTION INTRAVENOUS ONCE
Qty: 0 | Refills: 0 | Status: COMPLETED | OUTPATIENT
Start: 2017-05-14 | End: 2017-05-14

## 2017-05-14 RX ADMIN — Medication 1 TABLET(S): at 14:05

## 2017-05-14 RX ADMIN — ENOXAPARIN SODIUM 30 MILLIGRAM(S): 100 INJECTION SUBCUTANEOUS at 05:40

## 2017-05-14 RX ADMIN — Medication 100 MILLIEQUIVALENT(S): at 21:13

## 2017-05-14 RX ADMIN — ENOXAPARIN SODIUM 30 MILLIGRAM(S): 100 INJECTION SUBCUTANEOUS at 17:18

## 2017-05-14 RX ADMIN — Medication 325 MILLIGRAM(S): at 08:49

## 2017-05-14 RX ADMIN — Medication 325 MILLIGRAM(S): at 14:04

## 2017-05-14 RX ADMIN — Medication 100 MILLIGRAM(S): at 14:04

## 2017-05-14 RX ADMIN — Medication 100 MILLIEQUIVALENT(S): at 20:01

## 2017-05-14 RX ADMIN — Medication 5 MILLIGRAM(S): at 17:18

## 2017-05-14 RX ADMIN — PIPERACILLIN AND TAZOBACTAM 25 GRAM(S): 4; .5 INJECTION, POWDER, LYOPHILIZED, FOR SOLUTION INTRAVENOUS at 05:40

## 2017-05-14 RX ADMIN — POTASSIUM PHOSPHATE, MONOBASIC POTASSIUM PHOSPHATE, DIBASIC 62.5 MILLIMOLE(S): 236; 224 INJECTION, SOLUTION INTRAVENOUS at 21:12

## 2017-05-14 RX ADMIN — PIPERACILLIN AND TAZOBACTAM 25 GRAM(S): 4; .5 INJECTION, POWDER, LYOPHILIZED, FOR SOLUTION INTRAVENOUS at 21:13

## 2017-05-14 RX ADMIN — PIPERACILLIN AND TAZOBACTAM 25 GRAM(S): 4; .5 INJECTION, POWDER, LYOPHILIZED, FOR SOLUTION INTRAVENOUS at 14:05

## 2017-05-14 RX ADMIN — Medication 1 MILLIGRAM(S): at 14:04

## 2017-05-14 NOTE — PROGRESS NOTE ADULT - ASSESSMENT
Patient is a 89y old  Female who presents with a chief complaint of falls and found to have Left hip fracture s/p IM left femoral nailing. the post-op course complicated with RRT due to unresponsiveness and found to have fever, T102, multiple right upper and lower lobe opacities, most likely Aspiration pneumonia.    # Left hip fracture- s/p Im nailing  # s/p RRT due to unresponsiveness  # Aspiration pneumonia  # Sepsis - Fever and leukocytosis    Would recommend:  1. Continue zosyn inpatient  2. Aspiration Precaution  3. Keep NPO until evaluate by speech and swallo  4. Monitor WBC count, is trending down  5. Left hip incision site care as per Ortho      d/w Nursing staff and HHA, daughter at the bed side    Covering Dr. Dang

## 2017-05-14 NOTE — PROGRESS NOTE ADULT - ASSESSMENT
spiked temp  now afebrile  on zosyn  cxr   spirometry.  pt is npo second to abnormal gag since surgery   speech and swallow.  low hgb  needs t and cross  transfuse one unit of prbc  watch platelets  one reading of bp was high  will watch   supplement po4 and k

## 2017-05-14 NOTE — PROGRESS NOTE ADULT - SUBJECTIVE AND OBJECTIVE BOX
CHIEF COMPLAINT:Patient is a 89y old  Female who presents with a chief complaint of falls (11 May 2017 11:38) s/p left hip fx  surgery, thrombocytopenia.  had a few spikes of temp yesterday tmax 102. Cough noted.  pt is more awake.    	          REVIEW OF SYSTEMS:  CONSTITUTIONAL: had fever  EYES: No eye pain, visual disturbances, or discharge  NECK: No pain or stiffness  RESPIRATORY: No cough, wheezing, chills or hemoptysis; No Shortness of Breath  CARDIOVASCULAR: No chest pain, palpitations, passing out, dizziness, or leg swelling  GASTROINTESTINAL: No abdominal or epigastric pain. No nausea, vomiting, or hematemesis; No diarrhea or constipation. No melena or hematochezia.  GENITOURINARY: No dysuria, frequency, hematuria, or incontinence  NEUROLOGICAL: No headaches, memory loss, loss of strength, numbness, or tremors  SKIN: No itching, burning, rashes, or lesions   LYMPH Nodes: No enlarged glands  ENDOCRINE: No heat or cold intolerance; No hair loss  MUSCULOSKELETAL: No joint pain or swelling; No muscle, back, or extremity pain    Medications:  MEDICATIONS  (STANDING):  enoxaparin Injectable 30milliGRAM(s) SubCutaneous every 12 hours  docusate sodium 100milliGRAM(s) Oral three times a day  ferrous    sulfate 325milliGRAM(s) Oral three times a day with meals  folic acid 1milliGRAM(s) Oral daily  multivitamin 1Tablet(s) Oral daily  ascorbic acid 500milliGRAM(s) Oral two times a day  mirtazapine 15milliGRAM(s) Oral at bedtime  dextrose 5% + sodium chloride 0.9%. 1000milliLiter(s) IV Continuous <Continuous>  piperacillin/tazobactam IVPB. 3.375Gram(s) IV Intermittent every 8 hours  metoprolol Injectable 5milliGRAM(s) IV Push every 6 hours    MEDICATIONS  (PRN):  acetaminophen   Tablet 650milliGRAM(s) Oral every 6 hours PRN For Temp over 38.3 C (100.94 F)  aluminum hydroxide/magnesium hydroxide/simethicone Suspension 30milliLiter(s) Oral four times a day PRN Indigestion  ondansetron Injectable 4milliGRAM(s) IV Push every 6 hours PRN Nausea and/or Vomiting  oxyCODONE  5 mG/acetaminophen 325 mG 1Tablet(s) Oral every 4 hours PRN Moderate Pain (4 - 6)  oxyCODONE  5 mG/acetaminophen 325 mG 2Tablet(s) Oral every 6 hours PRN Severe Pain (7 - 10)  acetaminophen  Suppository 650milliGRAM(s) Rectal every 4 hours PRN For Temp greater than 38 C (100.4 F)    	    PHYSICAL EXAM:  T(C): 37, Max: 38.7 (05-13 @ 21:20)  HR: 92 (77 - 94)  BP: 175/53 (104/75 - 175/53)  RR: 18 (18 - 18)  SpO2: 95% (95% - 98%)  Wt(kg): --  I&O's Summary    I & Os for current day (as of 14 May 2017 19:25)  =============================================  IN: 0 ml / OUT: 1000 ml / NET: -1000 ml      Appearance: Normal	  HEENT:   Normal oral mucosa, PERRL, EOMI	  Lymphatic: No lymphadenopathy  Cardiovascular: Normal S1 S2, No JVD, No murmurs, No edema  Respiratory: Lungs clear to auscultation	  Psychiatry: A & O x 3, Mood & affect appropriate  Gastrointestinal:  Soft, Non-tender, + BS	  Skin: No rashes, No ecchymoses, No cyanosis	  Neurologic: Non-focal  Extremities: Normal range of motion, No clubbing, cyanosis or edema  Vascular: Peripheral pulses palpable 2+ bilaterally    TELEMETRY: 	    ECG:  	  RADIOLOGY:  OTHER: 	  	  LABS:	 	    CARDIAC MARKERS:  CARDIAC MARKERS ( 13 May 2017 08:05 )  0.035 ng/mL / x     / 218 U/L / x     / 1.2 ng/mL  CARDIAC MARKERS ( 12 May 2017 21:51 )  0.040 ng/mL / x     / 190 U/L / x     / <1.0 ng/mL                                7.7    20.4  )-----------( 85       ( 14 May 2017 07:35 ) hgb 8.8 to7.7    05-14    148<H>  |  117<H>  |  16  ----------------------------<  125<H>  3.5   |  23  |  1.14    Ca    7.7<L>      14 May 2017 07:35  Phos  1.5     05-14  Mg     1.9     05-14      proBNP:   Lipid Profile:   HgA1c:   TSH:

## 2017-05-14 NOTE — PROGRESS NOTE ADULT - SUBJECTIVE AND OBJECTIVE BOX
Patient is seen and examined at the bed side, spiked fever but currently afebrile. She is more awake and alert today. As per family at bed side, she is almost in her baseline. The Leukocytosis is trending down.      REVIEW OF SYSTEMS: All other review systems are negative      Vital Signs Last 24 Hrs  T(C): 37, Max: 38.9 (05-13 @ 17:46)  T(F): 98.6, Max: 102 (05-13 @ 17:46)  HR: 92 (77 - 100)  BP: 175/53 (104/75 - 175/53)  BP(mean): --  RR: 18 (16 - 18)  SpO2: 95% (95% - 98%)        GENERAL: Not in distress  CVS: s1 and s2 present  RESP: Air entry B/L  GI: abdomen soft and nontender  EXT: Left hip incision site has bandage in placed  CNS: Awake and alert        MEDICATIONS  (STANDING):  enoxaparin Injectable 30milliGRAM(s) SubCutaneous every 12 hours  docusate sodium 100milliGRAM(s) Oral three times a day  ferrous    sulfate 325milliGRAM(s) Oral three times a day with meals  folic acid 1milliGRAM(s) Oral daily  multivitamin 1Tablet(s) Oral daily  ascorbic acid 500milliGRAM(s) Oral two times a day  mirtazapine 15milliGRAM(s) Oral at bedtime  dextrose 5% + sodium chloride 0.9%. 1000milliLiter(s) IV Continuous <Continuous>  piperacillin/tazobactam IVPB. 3.375Gram(s) IV Intermittent every 8 hours  metoprolol Injectable 5milliGRAM(s) IV Push every 6 hours      Allergies    No Known Allergies              LABS:                        7.7    20.4  )-----------( 85       ( 14 May 2017 07:35 )             23.8     05-14    148<H>  |  117<H>  |  16  ----------------------------<  125<H>  3.5   |  23  |  1.14    Ca    7.7<L>      14 May 2017 07:35  Phos  1.5     05-14  Mg     1.9     05-14      Urinalysis Basic - ( 12 May 2017 15:36 )    Color: Yellow / Appearance: Clear / S.015 / pH: x  Gluc: x / Ketone: Negative  / Bili: Negative / Urobili: Negative   Blood: x / Protein: 30 mg/dL / Nitrite: Negative   Leuk Esterase: Negative / RBC: 0-2 /HPF / WBC 0-2 /HPF   Sq Epi: x / Non Sq Epi: Occasional / Bacteria: x      CAPILLARY BLOOD GLUCOSE    ABG - ( 12 May 2017 14:19 )  pH: 7.40  /  pCO2: 37    /  pO2: 79    / HCO3: 22    / Base Excess: -1.8  /  SaO2: 96                RADIOLOGY:      CXR:  2017  IMPRESSION: Mild diffuse interstitial prominence and scattered airspace opacities, most prominent in the right to mid to lower lung. Findings may be due to  edema or infection.       2017    CT HEAD WITHOUT CONTRAST:  No acute intracranial hemorrhage, mass effect or midline shift. MRI may  be performed for further evaluation as clinically indicated.      MICROBIOLOGY DATA:    Urine Microscopic-Add On (NC) (17 @ 15:36)    Epithelial Cells: Occasional    White Blood Cell - Urine: 0-2 /HPF    Red Blood Cell - Urine: 0-2 /HPF    Culture - Blood (17 @ 01:46)    Specimen Source: .Blood Blood-Venous    Culture Results:  No growth to date.    Culture - Blood (17 @ 01:38)    Specimen Source: .Blood Blood-Peripheral    Culture Results: No growth to date.        A 89 yo F from home lives alone with HHA 7 days and 24 hrs, ambulated with a walker,  R hip + femur and wrist fx (s/p repair in ), CLL and Alzheimer's dementia brought in to the ER for evaluation of a fall, landed on left hip. She found to have Left hip fracture underwent Im nailing .The post-op course complicated with RRT due to unresponsiveness and found to have fever, T102, multiple right upper and lower lobe opacities, most likely Aspiration pneumonia. She has started on zosyn and ID consult requested to assist with further evaluation and antibiotic management.          REVIEW OF SYSTEMS: Total of twelve systems have been reviewed and found to be negative unless mentioned in HPI        PAST MEDICAL & SURGICAL HISTORY:  UTI (urinary tract infection)  Dementia  Hypertension  Depression  HTN (hypertension)  CLL (chronic lymphocytic leukemia)  Femur fracture, right      SOCIAL HISTORY  Alcohol: Does not drink  Tobacco: Does not smoke  No Illicit substance use        FAMILY HISTORY: Non contributory to the present illness          MEDICATIONS  (STANDING):  enoxaparin Injectable 30milliGRAM(s) SubCutaneous every 12 hours  docusate sodium 100milliGRAM(s) Oral three times a day  ferrous    sulfate 325milliGRAM(s) Oral three times a day with meals  folic acid 1milliGRAM(s) Oral daily  multivitamin 1Tablet(s) Oral daily  ascorbic acid 500milliGRAM(s) Oral two times a day  metoprolol succinate ER 25milliGRAM(s) Oral daily  mirtazapine 15milliGRAM(s) Oral at bedtime  dextrose 5% + sodium chloride 0.9%. 1000milliLiter(s) IV Continuous <Continuous>  piperacillin/tazobactam IVPB. 3.375Gram(s) IV Intermittent every 8 hours        Vital Signs Last 24 Hrs  T(C): 36.8, Max: 38.8 ( @ 20:06)  T(F): 98.2, Max: 101.8 ( @ 20:06)  HR: 86 (78 - 101)  BP: 120/45 (94/32 - 160/57)  BP(mean): --  RR: 17 (15 - 20)  SpO2: 96% (95% - 99%)      PHYSICAL EXAM:    GENERAL: No tin distress  CVS: s1 and s2 present  RESP: Air entry B/L  GI: abdomen soft and nontender  EXT: Left hip incision site has bandage in placed  CNS: Awake and alert        LABS:                        9.2    30.2  )-----------( 91       ( 13 May 2017 08:05 )             28.6     -    144  |  113<H>  |  24<H>  ----------------------------<  112<H>  4.3   |  22  |  1.52<H>    Ca    8.1<L>      13 May 2017 08:05  Phos  3.1     05-12  Mg     1.8         TPro  5.1<L>  /  Alb  2.7<L>  /  TBili  0.3  /  DBili  x   /  AST  31  /  ALT  14  /  AlkPhos  61  -12      Urinalysis Basic - ( 12 May 2017 15:36 )    Color: Yellow / Appearance: Clear / S.015 / pH: x  Gluc: x / Ketone: Negative  / Bili: Negative / Urobili: Negative   Blood: x / Protein: 30 mg/dL / Nitrite: Negative   Leuk Esterase: Negative / RBC: 0-2 /HPF / WBC 0-2 /HPF   Sq Epi: x / Non Sq Epi: Occasional / Bacteria: x      CAPILLARY BLOOD GLUCOSE    ABG - ( 12 May 2017 14:19 )  pH: 7.40  /  pCO2: 37    /  pO2: 79    / HCO3: 22    / Base Excess: -1.8  /  SaO2: 96          RADIOLOGY:      CXR:  2017  IMPRESSION: Mild diffuse interstitial prominence and scattered airspace opacities, most prominent in the right to mid to lower lung. Findings may be due to  edema or infection.       2017    CT HEAD WITHOUT CONTRAST:  No acute intracranial hemorrhage, mass effect or midline shift. MRI may  be performed for further evaluation as clinically indicated.      MICROBIOLOGY DATA:    Urine Microscopic-Add On (NC) (17 @ 15:36)    Epithelial Cells: Occasional    White Blood Cell - Urine: 0-2 /HPF    Red Blood Cell - Urine: 0-2 /HPF    17 Blood culture: Pending

## 2017-05-15 LAB
ANION GAP SERPL CALC-SCNC: 8 MMOL/L — SIGNIFICANT CHANGE UP (ref 5–17)
BASOPHILS NFR BLD AUTO: 1 % — SIGNIFICANT CHANGE UP (ref 0–2)
BUN SERPL-MCNC: 13 MG/DL — SIGNIFICANT CHANGE UP (ref 7–18)
CALCIUM SERPL-MCNC: 7.7 MG/DL — LOW (ref 8.4–10.5)
CHLORIDE SERPL-SCNC: 114 MMOL/L — HIGH (ref 96–108)
CO2 SERPL-SCNC: 22 MMOL/L — SIGNIFICANT CHANGE UP (ref 22–31)
CREAT SERPL-MCNC: 1.03 MG/DL — SIGNIFICANT CHANGE UP (ref 0.5–1.3)
EOSINOPHIL NFR BLD AUTO: 1 % — SIGNIFICANT CHANGE UP (ref 0–6)
GLUCOSE SERPL-MCNC: 117 MG/DL — HIGH (ref 70–99)
HCT VFR BLD CALC: 27 % — LOW (ref 34.5–45)
HGB BLD-MCNC: 9.3 G/DL — LOW (ref 11.5–15.5)
LYMPHOCYTES # BLD AUTO: 71 % — HIGH (ref 13–44)
MAGNESIUM SERPL-MCNC: 1.6 MG/DL — SIGNIFICANT CHANGE UP (ref 1.6–2.6)
MCHC RBC-ENTMCNC: 31.4 PG — SIGNIFICANT CHANGE UP (ref 27–34)
MCHC RBC-ENTMCNC: 34.3 GM/DL — SIGNIFICANT CHANGE UP (ref 32–36)
MCV RBC AUTO: 91.8 FL — SIGNIFICANT CHANGE UP (ref 80–100)
MONOCYTES NFR BLD AUTO: 3 % — SIGNIFICANT CHANGE UP (ref 2–14)
NEUTROPHILS NFR BLD AUTO: 24 % — LOW (ref 43–77)
PHOSPHATE SERPL-MCNC: 1.9 MG/DL — LOW (ref 2.5–4.5)
PLATELET # BLD AUTO: 115 K/UL — LOW (ref 150–400)
POTASSIUM SERPL-MCNC: 4 MMOL/L — SIGNIFICANT CHANGE UP (ref 3.5–5.3)
POTASSIUM SERPL-SCNC: 4 MMOL/L — SIGNIFICANT CHANGE UP (ref 3.5–5.3)
RBC # BLD: 2.95 M/UL — LOW (ref 3.8–5.2)
RBC # FLD: 13.2 % — SIGNIFICANT CHANGE UP (ref 10.3–14.5)
SODIUM SERPL-SCNC: 144 MMOL/L — SIGNIFICANT CHANGE UP (ref 135–145)
WBC # BLD: 25.9 K/UL — HIGH (ref 3.8–10.5)
WBC # FLD AUTO: 25.9 K/UL — HIGH (ref 3.8–10.5)

## 2017-05-15 RX ORDER — FUROSEMIDE 40 MG
40 TABLET ORAL DAILY
Qty: 0 | Refills: 0 | Status: DISCONTINUED | OUTPATIENT
Start: 2017-05-15 | End: 2017-05-15

## 2017-05-15 RX ORDER — FUROSEMIDE 40 MG
40 TABLET ORAL DAILY
Qty: 0 | Refills: 0 | Status: COMPLETED | OUTPATIENT
Start: 2017-05-15 | End: 2017-05-17

## 2017-05-15 RX ORDER — POTASSIUM PHOSPHATE, MONOBASIC POTASSIUM PHOSPHATE, DIBASIC 236; 224 MG/ML; MG/ML
15 INJECTION, SOLUTION INTRAVENOUS ONCE
Qty: 0 | Refills: 0 | Status: COMPLETED | OUTPATIENT
Start: 2017-05-15 | End: 2017-05-15

## 2017-05-15 RX ORDER — ALBUTEROL 90 UG/1
2.5 AEROSOL, METERED ORAL EVERY 6 HOURS
Qty: 0 | Refills: 0 | Status: DISCONTINUED | OUTPATIENT
Start: 2017-05-15 | End: 2017-05-18

## 2017-05-15 RX ADMIN — Medication 40 MILLIGRAM(S): at 13:40

## 2017-05-15 RX ADMIN — ENOXAPARIN SODIUM 30 MILLIGRAM(S): 100 INJECTION SUBCUTANEOUS at 06:00

## 2017-05-15 RX ADMIN — ENOXAPARIN SODIUM 30 MILLIGRAM(S): 100 INJECTION SUBCUTANEOUS at 18:32

## 2017-05-15 RX ADMIN — Medication 125 MILLIGRAM(S): at 15:18

## 2017-05-15 RX ADMIN — PIPERACILLIN AND TAZOBACTAM 25 GRAM(S): 4; .5 INJECTION, POWDER, LYOPHILIZED, FOR SOLUTION INTRAVENOUS at 13:40

## 2017-05-15 RX ADMIN — Medication 5 MILLIGRAM(S): at 06:00

## 2017-05-15 RX ADMIN — ALBUTEROL 2.5 MILLIGRAM(S): 90 AEROSOL, METERED ORAL at 09:21

## 2017-05-15 RX ADMIN — ALBUTEROL 2.5 MILLIGRAM(S): 90 AEROSOL, METERED ORAL at 20:55

## 2017-05-15 RX ADMIN — Medication 5 MILLIGRAM(S): at 13:40

## 2017-05-15 RX ADMIN — PIPERACILLIN AND TAZOBACTAM 25 GRAM(S): 4; .5 INJECTION, POWDER, LYOPHILIZED, FOR SOLUTION INTRAVENOUS at 21:48

## 2017-05-15 RX ADMIN — Medication 5 MILLIGRAM(S): at 18:32

## 2017-05-15 RX ADMIN — POTASSIUM PHOSPHATE, MONOBASIC POTASSIUM PHOSPHATE, DIBASIC 62.5 MILLIMOLE(S): 236; 224 INJECTION, SOLUTION INTRAVENOUS at 09:09

## 2017-05-15 RX ADMIN — ALBUTEROL 2.5 MILLIGRAM(S): 90 AEROSOL, METERED ORAL at 14:26

## 2017-05-15 RX ADMIN — PIPERACILLIN AND TAZOBACTAM 25 GRAM(S): 4; .5 INJECTION, POWDER, LYOPHILIZED, FOR SOLUTION INTRAVENOUS at 05:59

## 2017-05-15 RX ADMIN — Medication 40 MILLIGRAM(S): at 21:48

## 2017-05-15 NOTE — CONSULT NOTE ADULT - ASSESSMENT
Ros Bronchitis r/o cardiac asthma  Pneumonia Rt CAP  Htn with MR r/o CHF  Depression /dementia  Fall  Anemia   LLL  Plan echo now,1-2 doses of lasix  iv steroids x2 days  duoneb 1 dose qid  no lopressor  Mg and po4 supplement

## 2017-05-15 NOTE — SWALLOW BEDSIDE ASSESSMENT ADULT - SWALLOW EVAL: DIAGNOSIS
Pt p/w s&s of psychogenic dysphagia c/b oral defensiveness, refusal to accept PO trials, & agitation & combativeness (throwing liquid, pinching) in response to PO presentation. Pt p/w s&s of psychogenic dysphagia c/b oral defensiveness, refusal to accept PO trials, exacerbated by increased agitation & combativeness (throwing liquid, pinching) in response to PO presentation.

## 2017-05-15 NOTE — PROGRESS NOTE ADULT - SUBJECTIVE AND OBJECTIVE BOX
fever subsided  some wheezing and cough  ?aspiration, for speech evaluation  no abd pain or leg swelling.                      9.3    25.9  )-----------( 115      ( 15 May 2017 06:21 )             27.0

## 2017-05-15 NOTE — PROGRESS NOTE ADULT - SUBJECTIVE AND OBJECTIVE BOX
Patient is a 89y old  Female who presents with a chief complaint of falls (11 May 2017 11:38)  Insomnia, wheezing.      INTERVAL HPI/OVERNIGHT EVENTS:  T(C): 36.9, Max: 37.6 (05-14 @ 20:50)  HR: 107 (77 - 116)  BP: 148/89 (100/66 - 175/53)  RR: 18 (18 - 19)  SpO2: 96% (95% - 98%)  Wt(kg): --  I&O's Summary    I & Os for current day (as of 15 May 2017 08:36)  =============================================  IN: 0 ml / OUT: 1100 ml / NET: -1100 ml      LABS:                        7.7    20.4  )-----------( 85       ( 14 May 2017 07:35 )             23.8     05-15    144  |  114<H>  |  13  ----------------------------<  117<H>  4.0   |  22  |  1.03    Ca    7.7<L>      15 May 2017 06:21  Phos  1.9     05-15  Mg     1.6     05-15          CAPILLARY BLOOD GLUCOSE          REVIEW OF SYSTEMS: confused.  CONSTITUTIONAL:  fever Friday,,no weight loss, or fatigue  EYES: No eye pain, visual disturbances, or discharge  ENMT:  No difficulty hearing, tinnitus, vertigo; No sinus or throat pain  NECK: No pain or stiffness  RESPIRATORY: No cough, wheezing, chills or hemoptysis; No shortness of breath  CARDIOVASCULAR: No chest pain, palpitations, dizziness, or leg swelling  GASTROINTESTINAL: No abdominal or epigastric pain. No nausea, vomiting, or hematemesis; No diarrhea or constipation. No melena or hematochezia.  GENITOURINARY: No dysuria, frequency, hematuria, or incontinence  NEUROLOGICAL: No headaches, memory loss, loss of strength, numbness, or tremors  SKIN: No itching, burning, rashes, or lesions   LYMPH NODES: No enlarged glands  ENDOCRINE: No heat or cold intolerance; No hair loss  MUSCULOSKELETAL: No joint pain or swelling; No muscle, back, or extremity pain  PSYCHIATRIC: No depression, anxiety, mood swings, or difficulty sleeping  HEME/LYMPH: No easy bruising, or bleeding gums  ALLERY AND IMMUNOLOGIC: No hives or eczema    RADIOLOGY & ADDITIONAL TESTS:    Imaging Personally Reviewed:  [ ] YES  [x ] NO    Consultant(s) Notes Reviewed:  [ ] YES  [x ] NO    PHYSICAL EXAM:  GENERAL: NAD, well-groomed, well-developed  HEAD:  Atraumatic, Normocephalic  EYES: EOMI, PERRLA, conjunctiva and sclera clear  NECK: Supple, No JVD, Normal thyroid  NERVOUS SYSTEM:  Alert & Oriented X3, Good concentration; Motor Strength 5/5 B/L upper and lower extremities; DTRs 2+ intact and symmetric  CHEST/LUNG: Clear to percussion bilaterally; No rales, rhonchi, wheezing, or rubs  HEART: Regular rate and rhythm; No murmurs, rubs, or gallops  ABDOMEN: Soft, Nontender, Nondistended; Bowel sounds present  EXTREMITIES:  2+ Peripheral Pulses, No clubbing, cyanosis, or edema  LYMPH: No lymphadenopathy noted  SKIN: left hip ORIF scar.    Care Discussed Surgical PA    enoxaparin Injectable 30milliGRAM(s) SubCutaneous every 12 hours  acetaminophen   Tablet 650milliGRAM(s) Oral every 6 hours PRN For Temp over 38.3 C (100.94 F)  aluminum hydroxide/magnesium hydroxide/simethicone Suspension 30milliLiter(s) Oral four times a day PRN Indigestion  ondansetron Injectable 4milliGRAM(s) IV Push every 6 hours PRN Nausea and/or Vomiting  docusate sodium 100milliGRAM(s) Oral three times a day  ferrous    sulfate 325milliGRAM(s) Oral three times a day with meals  folic acid 1milliGRAM(s) Oral daily  multivitamin 1Tablet(s) Oral daily  ascorbic acid 500milliGRAM(s) Oral two times a day  oxyCODONE  5 mG/acetaminophen 325 mG 1Tablet(s) Oral every 4 hours PRN Moderate Pain (4 - 6)  oxyCODONE  5 mG/acetaminophen 325 mG 2Tablet(s) Oral every 6 hours PRN Severe Pain (7 - 10)  mirtazapine 15milliGRAM(s) Oral at bedtime  dextrose 5% + sodium chloride 0.9%. 1000milliLiter(s) IV Continuous <Continuous>  piperacillin/tazobactam IVPB. 3.375Gram(s) IV Intermittent every 8 hours  acetaminophen  Suppository 650milliGRAM(s) Rectal every 4 hours PRN For Temp greater than 38 C (100.4 F)  metoprolol Injectable 5milliGRAM(s) IV Push every 6 hours  potassium phosphate IVPB 15milliMole(s) IV Intermittent once  ALBUTerol    0.083% 2.5milliGRAM(s) Nebulizer every 6 hours      A/P s/p left ORIF, fever  on IV ab, ID on case.  DVT prophylaxis, monitor h/h  PT

## 2017-05-15 NOTE — CONSULT NOTE ADULT - SUBJECTIVE AND OBJECTIVE BOX
PULMONARY CONSULT NOTE      GRETCHEN TOURE  MRN-[id number]    Patient is a 89y old  Female who presents with a chief complaint of falls (11 May 2017 11:38)Pt has been wheezing for a few days,cough  Hx chart lab and xrays reviewed      HISTORY OF PRESENT ILLNESS:    MEDICATIONS  (STANDING):  enoxaparin Injectable 30milliGRAM(s) SubCutaneous every 12 hours  docusate sodium 100milliGRAM(s) Oral three times a day  ferrous    sulfate 325milliGRAM(s) Oral three times a day with meals  folic acid 1milliGRAM(s) Oral daily  multivitamin 1Tablet(s) Oral daily  ascorbic acid 500milliGRAM(s) Oral two times a day  mirtazapine 15milliGRAM(s) Oral at bedtime  dextrose 5% + sodium chloride 0.9%. 1000milliLiter(s) IV Continuous <Continuous>  piperacillin/tazobactam IVPB. 3.375Gram(s) IV Intermittent every 8 hours  metoprolol Injectable 5milliGRAM(s) IV Push every 6 hours  ALBUTerol    0.083% 2.5milliGRAM(s) Nebulizer every 6 hours      MEDICATIONS  (PRN):  acetaminophen   Tablet 650milliGRAM(s) Oral every 6 hours PRN For Temp over 38.3 C (100.94 F)  aluminum hydroxide/magnesium hydroxide/simethicone Suspension 30milliLiter(s) Oral four times a day PRN Indigestion  ondansetron Injectable 4milliGRAM(s) IV Push every 6 hours PRN Nausea and/or Vomiting  oxyCODONE  5 mG/acetaminophen 325 mG 1Tablet(s) Oral every 4 hours PRN Moderate Pain (4 - 6)  oxyCODONE  5 mG/acetaminophen 325 mG 2Tablet(s) Oral every 6 hours PRN Severe Pain (7 - 10)  acetaminophen  Suppository 650milliGRAM(s) Rectal every 4 hours PRN For Temp greater than 38 C (100.4 F)      Allergies    No Known Allergies    Intolerances        PAST MEDICAL & SURGICAL HISTORY:  UTI (urinary tract infection)  Dementia  Hypertension  Depression  HTN (hypertension)  CLL (chronic lymphocytic leukemia)  Femur fracture, right      FAMILY HISTORY:not relevant      SOCIAL HISTORY SMOKING --   ETOH--   DRUGS--      REVIEW OF SYSTEMS:  CONSTITUTIONAL: No fever, weight loss, or fatigue   EYES: No eye pain, visual disturbances, or discharge  ENMT:  No difficulty hearing, tinnitus, vertigo; No sinus or throat pain  NECK: No pain or stiffness  RESPIRATORY:  cough,+ wheezing +chills or hemoptysis --;  Shortness of Breath  ++  CARDIOVASCULAR: No chest pain, palpitations, passing out, dizziness, or leg swelling  GASTROINTESTINAL: No abdominal or epigastric pain. No nausea, vomiting, or hematemesis; No diarrhea or constipation. No melena or hematochezia.  GENITOURINARY: No dysuria, frequency, hematuria, or incontinence  NEUROLOGICAL: No headaches, memory loss, loss of strength, numbness, or tremors  SKIN: No itching, burning, rashes, or lesions   LYMPH Nodes: No enlarged glands  ENDOCRINE: No heat or cold intolerance; No hair loss  MUSCULOSKELETAL: No joint pain or swelling; No muscle, back, or extremity pain  PSYCHIATRIC: No depression, anxiety, mood swings, or difficulty sleeping  HEME/LYMPH: No easy bruising, or bleeding gums  ALLERY AND IMMUNOLOGIC: No hives or eczema    Vital Signs Last 24 Hrs  T(C): 36.9, Max: 37.6 (05-14 @ 20:50)  T(F): 98.5, Max: 99.7 (05-14 @ 20:50)  HR: 107 (77 - 116)  BP: 148/89 (100/66 - 175/53)  BP(mean): --  RR: 18 (18 - 19)  SpO2: 96% (95% - 98%)  I&O's Detail    I & Os for current day (as of 15 May 2017 09:34)  =============================================  IN:    Total IN: 0 ml  ---------------------------------------------  OUT:    Indwelling Catheter - Urethral: 1100 ml    Total OUT: 1100 ml  ---------------------------------------------  Total NET: -1100 ml      PHYSICAL EXAMINATION:    GENERAL: The patient is a well-developed, well-nourished _____in no apparent distress.     HEENT: Head is normocephalic and atraumatic. Extraocular muscles are intact. Mucous membranes are moist.   Skin: No rashes, No ecchymoses, No cyanosis  Neck supple LN not felt, JVP not increased  Lymphatic: No lymphadenopathy  Cardiovascular: Normal S1 S2,  JVp not increased , syst murmur at apex,  gallop neg  Respiratory:breathing vesicular, rales OCC rt lung   ,WheezeEnd expiratory   BS normal reduced  Gastrointestinal:  Soft, Non-tender,  BS positive, No  hepatosplenomegaly 		  Extremities: Normal range of motion, No clubbing, cyanosis or edema  Vascular: Peripheral pulses palpable 2+ bilaterally  NEUROLOGIC:   A & O x 1, Mood & affect appropriate  Cranial nerves intact  sensoryok  motor dtr 2+  DTR  babinskiNeg    LABS:                        9.3    25.9  )-----------( 115      ( 15 May 2017 06:21 )             27.0     05-15    144  |  114<H>  |  13  ----------------------------<  117<H>  4.0   |  22  |  1.03    Ca    7.7<L>      15 May 2017 06:21  Phos  1.9     05-15  Mg     1.6     05-15                        ild diffuse interstitial prominence and scattered airspace opacities,   most prominent in the right to mid to lower lung, similar to the prior   study. Findings may be due to edema or infection.  Mild pulmonary vascular congestion is also present. Possible trace   bilateral pleural effusions.  No pneumothorax.   MICROBIOLOGY:blood c/s neg    RADIOLOGY & ADDITIONAL STUDIES:    CXR:mild diffuse interstitial prominence and scattered airspace opacities,   most prominent in the right to mid to lower lung, similar to the prior   study. Findings may be due to edema or infection.  Mild pulmonary vascular congestion is also present. Possible trace   bilateral pleural effusions.  No pneumothorax.     Ct scan chest:     EKG RSR RAD    echo:pending

## 2017-05-15 NOTE — SWALLOW BEDSIDE ASSESSMENT ADULT - ADDITIONAL RECOMMENDATIONS
Please reconsult if pt appears more willing to accept PO and participate in full swallow evaluation. 1)Please reconsult if pt appears more willing to accept PO and participate in full swallow evaluation.  2) May consider artificial means of nutrition/hydration if needs cannot be met via PO, if in alignment with pt/ HCP's goals of care

## 2017-05-16 DIAGNOSIS — J69.0 PNEUMONITIS DUE TO INHALATION OF FOOD AND VOMIT: ICD-10-CM

## 2017-05-16 DIAGNOSIS — S72.009G: ICD-10-CM

## 2017-05-16 DIAGNOSIS — G30.8 OTHER ALZHEIMER'S DISEASE: ICD-10-CM

## 2017-05-16 DIAGNOSIS — C91.10 CHRONIC LYMPHOCYTIC LEUKEMIA OF B-CELL TYPE NOT HAVING ACHIEVED REMISSION: ICD-10-CM

## 2017-05-16 DIAGNOSIS — D69.6 THROMBOCYTOPENIA, UNSPECIFIED: ICD-10-CM

## 2017-05-16 LAB
ANION GAP SERPL CALC-SCNC: 10 MMOL/L — SIGNIFICANT CHANGE UP (ref 5–17)
BASOPHILS # BLD AUTO: 0.2 K/UL — SIGNIFICANT CHANGE UP (ref 0–0.2)
BASOPHILS NFR BLD AUTO: 1 % — SIGNIFICANT CHANGE UP (ref 0–2)
BUN SERPL-MCNC: 21 MG/DL — HIGH (ref 7–18)
CALCIUM SERPL-MCNC: 8.8 MG/DL — SIGNIFICANT CHANGE UP (ref 8.4–10.5)
CHLORIDE SERPL-SCNC: 110 MMOL/L — HIGH (ref 96–108)
CO2 SERPL-SCNC: 24 MMOL/L — SIGNIFICANT CHANGE UP (ref 22–31)
CREAT SERPL-MCNC: 1.31 MG/DL — HIGH (ref 0.5–1.3)
EOSINOPHIL # BLD AUTO: 0 K/UL — SIGNIFICANT CHANGE UP (ref 0–0.5)
EOSINOPHIL NFR BLD AUTO: 0 % — SIGNIFICANT CHANGE UP (ref 0–6)
GLUCOSE SERPL-MCNC: 192 MG/DL — HIGH (ref 70–99)
HCT VFR BLD CALC: 32 % — LOW (ref 34.5–45)
HGB BLD-MCNC: 10.7 G/DL — LOW (ref 11.5–15.5)
LYMPHOCYTES # BLD AUTO: 16.3 K/UL — HIGH (ref 1–3.3)
LYMPHOCYTES # BLD AUTO: 70.6 % — HIGH (ref 13–44)
MAGNESIUM SERPL-MCNC: 1.9 MG/DL — SIGNIFICANT CHANGE UP (ref 1.6–2.6)
MCHC RBC-ENTMCNC: 31.6 PG — SIGNIFICANT CHANGE UP (ref 27–34)
MCHC RBC-ENTMCNC: 33.5 GM/DL — SIGNIFICANT CHANGE UP (ref 32–36)
MCV RBC AUTO: 94.2 FL — SIGNIFICANT CHANGE UP (ref 80–100)
MONOCYTES # BLD AUTO: 0.4 K/UL — SIGNIFICANT CHANGE UP (ref 0–0.9)
MONOCYTES NFR BLD AUTO: 1.7 % — LOW (ref 2–14)
NEUTROPHILS # BLD AUTO: 6.2 K/UL — SIGNIFICANT CHANGE UP (ref 1.8–7.4)
NEUTROPHILS NFR BLD AUTO: 26.7 % — LOW (ref 43–77)
PHOSPHATE SERPL-MCNC: 3.4 MG/DL — SIGNIFICANT CHANGE UP (ref 2.5–4.5)
PLATELET # BLD AUTO: 149 K/UL — LOW (ref 150–400)
POTASSIUM SERPL-MCNC: 3.7 MMOL/L — SIGNIFICANT CHANGE UP (ref 3.5–5.3)
POTASSIUM SERPL-SCNC: 3.7 MMOL/L — SIGNIFICANT CHANGE UP (ref 3.5–5.3)
RBC # BLD: 3.39 M/UL — LOW (ref 3.8–5.2)
RBC # FLD: 13 % — SIGNIFICANT CHANGE UP (ref 10.3–14.5)
SODIUM SERPL-SCNC: 144 MMOL/L — SIGNIFICANT CHANGE UP (ref 135–145)
WBC # BLD: 23.1 K/UL — HIGH (ref 3.8–10.5)
WBC # FLD AUTO: 23.1 K/UL — HIGH (ref 3.8–10.5)

## 2017-05-16 PROCEDURE — 71010: CPT | Mod: 26

## 2017-05-16 RX ORDER — DOCUSATE SODIUM 100 MG
100 CAPSULE ORAL
Qty: 0 | Refills: 0 | Status: DISCONTINUED | OUTPATIENT
Start: 2017-05-16 | End: 2017-05-18

## 2017-05-16 RX ORDER — METOPROLOL TARTRATE 50 MG
25 TABLET ORAL DAILY
Qty: 0 | Refills: 0 | Status: DISCONTINUED | OUTPATIENT
Start: 2017-05-16 | End: 2017-05-18

## 2017-05-16 RX ORDER — MORPHINE SULFATE 50 MG/1
2 CAPSULE, EXTENDED RELEASE ORAL
Qty: 0 | Refills: 0 | Status: DISCONTINUED | OUTPATIENT
Start: 2017-05-16 | End: 2017-05-18

## 2017-05-16 RX ORDER — SENNA PLUS 8.6 MG/1
2 TABLET ORAL AT BEDTIME
Qty: 0 | Refills: 0 | Status: DISCONTINUED | OUTPATIENT
Start: 2017-05-16 | End: 2017-05-18

## 2017-05-16 RX ORDER — LANOLIN ALCOHOL/MO/W.PET/CERES
3 CREAM (GRAM) TOPICAL AT BEDTIME
Qty: 0 | Refills: 0 | Status: DISCONTINUED | OUTPATIENT
Start: 2017-05-16 | End: 2017-05-18

## 2017-05-16 RX ADMIN — Medication 3 MILLIGRAM(S): at 22:54

## 2017-05-16 RX ADMIN — ALBUTEROL 2.5 MILLIGRAM(S): 90 AEROSOL, METERED ORAL at 18:05

## 2017-05-16 RX ADMIN — Medication 5 MILLIGRAM(S): at 05:45

## 2017-05-16 RX ADMIN — ALBUTEROL 2.5 MILLIGRAM(S): 90 AEROSOL, METERED ORAL at 21:28

## 2017-05-16 RX ADMIN — Medication 500 MILLIGRAM(S): at 17:26

## 2017-05-16 RX ADMIN — Medication 100 MILLIGRAM(S): at 13:17

## 2017-05-16 RX ADMIN — ALBUTEROL 2.5 MILLIGRAM(S): 90 AEROSOL, METERED ORAL at 08:52

## 2017-05-16 RX ADMIN — Medication 325 MILLIGRAM(S): at 12:25

## 2017-05-16 RX ADMIN — Medication 325 MILLIGRAM(S): at 09:03

## 2017-05-16 RX ADMIN — Medication 1 TABLET(S): at 12:25

## 2017-05-16 RX ADMIN — ENOXAPARIN SODIUM 30 MILLIGRAM(S): 100 INJECTION SUBCUTANEOUS at 05:46

## 2017-05-16 RX ADMIN — PIPERACILLIN AND TAZOBACTAM 25 GRAM(S): 4; .5 INJECTION, POWDER, LYOPHILIZED, FOR SOLUTION INTRAVENOUS at 13:17

## 2017-05-16 RX ADMIN — ENOXAPARIN SODIUM 30 MILLIGRAM(S): 100 INJECTION SUBCUTANEOUS at 17:26

## 2017-05-16 RX ADMIN — Medication 100 MILLIGRAM(S): at 22:01

## 2017-05-16 RX ADMIN — SENNA PLUS 2 TABLET(S): 8.6 TABLET ORAL at 22:02

## 2017-05-16 RX ADMIN — Medication 500 MILLIGRAM(S): at 05:46

## 2017-05-16 RX ADMIN — Medication 100 MILLIGRAM(S): at 05:46

## 2017-05-16 RX ADMIN — Medication 325 MILLIGRAM(S): at 17:26

## 2017-05-16 RX ADMIN — Medication 40 MILLIGRAM(S): at 05:46

## 2017-05-16 RX ADMIN — PIPERACILLIN AND TAZOBACTAM 25 GRAM(S): 4; .5 INJECTION, POWDER, LYOPHILIZED, FOR SOLUTION INTRAVENOUS at 22:01

## 2017-05-16 RX ADMIN — MIRTAZAPINE 15 MILLIGRAM(S): 45 TABLET, ORALLY DISINTEGRATING ORAL at 22:01

## 2017-05-16 RX ADMIN — PIPERACILLIN AND TAZOBACTAM 25 GRAM(S): 4; .5 INJECTION, POWDER, LYOPHILIZED, FOR SOLUTION INTRAVENOUS at 05:46

## 2017-05-16 RX ADMIN — Medication 1 MILLIGRAM(S): at 12:25

## 2017-05-16 NOTE — PROGRESS NOTE ADULT - ASSESSMENT
Ros Bronchitis r/o cardiac asthma  Pneumonia Rt CAP  Htn with MR r/o CHF  Depression /dementia  Fall,anemia  PLAN d/c STEROID IV TONIGHT  D/C LASIX IV  CONTINUE DUONEB BY hfn QID          duoneb 1 dose qid  no lopressor  Mg and po4 supplement

## 2017-05-16 NOTE — SWALLOW BEDSIDE ASSESSMENT ADULT - SLP PERTINENT HISTORY OF CURRENT PROBLEM
89 y/o F from home lives alone with HHA 24/7, CLL, HTN, depression, R hip + femur and wrist fx (s/p repair in 2012), PNA, macular degeneration, & Alzheimer's dementia w/ behavioral disturbance. Pt is reportedly more cooperative now, and CXR (+) bilateral pleural effusion.
89 yo F from home lives alone with HHA 24/7, ambulates with walker, AO x 2 (person and place) at baseline. PMH CLL, HTN, depression, R hip + femur and wrist fx (s/p repair in 2012), PNA, macular degeneration, & Alzheimer's dementia w/ behavioral disturbance. S/p fall, reportedly pt landed on L hip, and in ED c/o left hip pain exacerbated by movement and touch. CXR (+) bilateral pleural effusion.

## 2017-05-16 NOTE — SWALLOW BEDSIDE ASSESSMENT ADULT - PHARYNGEAL PHASE
Decreased laryngeal elevation/Delayed pharyngeal swallow/Multiple swallows Multiple swallows/Delayed pharyngeal swallow Multiple swallows/Delayed pharyngeal swallow/Cough post oral intake/Throat clear post oral intake

## 2017-05-16 NOTE — PROGRESS NOTE ADULT - SUBJECTIVE AND OBJECTIVE BOX
Patient is seen and examined at the bed side, remains afebrile. She is doing well and tolerating puree diet.  The Leukocytosis is trending down slowly.      REVIEW OF SYSTEMS: All other review systems are negative        Vital Signs Last 24 Hrs  T(C): 36.6, Max: 36.6 (05-15 @ 23:35)  T(F): 97.9, Max: 97.9 (05-16 @ 05:40)  HR: 85 (70 - 95)  BP: 122/63 (103/54 - 161/71)  BP(mean): --  RR: 17 (17 - 20)  SpO2: 96% (95% - 97%)        GENERAL: Not in distress  CVS: s1 and s2 present  RESP: Air entry B/L  GI: abdomen soft and nontender  EXT: Left hip incision site has bandage in placed  CNS: Awake and alert        MEDICATIONS  (STANDING):  enoxaparin Injectable 30milliGRAM(s) SubCutaneous every 12 hours  mirtazapine 15milliGRAM(s) Oral at bedtime  piperacillin/tazobactam IVPB. 3.375Gram(s) IV Intermittent every 8 hours  ALBUTerol    0.083% 2.5milliGRAM(s) Nebulizer every 6 hours  furosemide   Injectable 40milliGRAM(s) IV Push daily  metoprolol succinate ER 25milliGRAM(s) Oral daily        Allergies    No Known Allergies        LABS:                          10.7   23.1  )-----------( 149      ( 16 May 2017 07:13 )             32.0     05-16    144  |  110<H>  |  21<H>  ----------------------------<  192<H>  3.7   |  24  |  1.31<H>    Ca    8.8      16 May 2017 07:13  Phos  3.4     05-16  Mg     1.9     05-16          MEDICATIONS  (STANDING):  enoxaparin Injectable 30milliGRAM(s) SubCutaneous every 12 hours  docusate sodium 100milliGRAM(s) Oral three times a day  ferrous    sulfate 325milliGRAM(s) Oral three times a day with meals  folic acid 1milliGRAM(s) Oral daily  multivitamin 1Tablet(s) Oral daily  ascorbic acid 500milliGRAM(s) Oral two times a day  mirtazapine 15milliGRAM(s) Oral at bedtime  piperacillin/tazobactam IVPB. 3.375Gram(s) IV Intermittent every 8 hours  ALBUTerol    0.083% 2.5milliGRAM(s) Nebulizer every 6 hours  furosemide   Injectable 40milliGRAM(s) IV Push daily  metoprolol succinate ER 25milliGRAM(s) Oral daily  docusate sodium 100milliGRAM(s) Oral two times a day  senna 2Tablet(s) Oral at bedtime      Urinalysis Basic - ( 12 May 2017 15:36 )    Color: Yellow / Appearance: Clear / S.015 / pH: x  Gluc: x / Ketone: Negative  / Bili: Negative / Urobili: Negative   Blood: x / Protein: 30 mg/dL / Nitrite: Negative   Leuk Esterase: Negative / RBC: 0-2 /HPF / WBC 0-2 /HPF   Sq Epi: x / Non Sq Epi: Occasional / Bacteria: x      CAPILLARY BLOOD GLUCOSE    ABG - ( 12 May 2017 14:19 )  pH: 7.40  /  pCO2: 37    /  pO2: 79    / HCO3: 22    / Base Excess: -1.8  /  SaO2: 96                RADIOLOGY:    17 CXR: Improvement in mild diffuse interstitial prominence. Mild pulmonary  vascular congestion persists    CXR:  2017  IMPRESSION: Mild diffuse interstitial prominence and scattered airspace opacities, most prominent in the right to mid to lower lung. Findings may be due to  edema or infection.    2017    CT HEAD WITHOUT CONTRAST:  No acute intracranial hemorrhage, mass effect or midline shift. MRI may  be performed for further evaluation as clinically indicated.        MICROBIOLOGY DATA:    Urine Microscopic-Add On (NC) (17 @ 15:36)    Epithelial Cells: Occasional    White Blood Cell - Urine: 0-2 /HPF    Red Blood Cell - Urine: 0-2 /HPF    Culture - Blood (17 @ 01:46)    Specimen Source: .Blood Blood-Venous    Culture Results:  No growth to date.    Culture - Blood (17 @ 01:38)    Specimen Source: .Blood Blood-Peripheral    Culture Results: No growth to date.        A 89 yo F from home lives alone with HHA 7 days and 24 hrs, ambulated with a walker,  R hip + femur and wrist fx (s/p repair in ), CLL and Alzheimer's dementia brought in to the ER for evaluation of a fall, landed on left hip. She found to have Left hip fracture underwent Im nailing .The post-op course complicated with RRT due to unresponsiveness and found to have fever, T102, multiple right upper and lower lobe opacities, most likely Aspiration pneumonia. She has started on zosyn and ID consult requested to assist with further evaluation and antibiotic management.          REVIEW OF SYSTEMS: Total of twelve systems have been reviewed and found to be negative unless mentioned in HPI        PAST MEDICAL & SURGICAL HISTORY:  UTI (urinary tract infection)  Dementia  Hypertension  Depression  HTN (hypertension)  CLL (chronic lymphocytic leukemia)  Femur fracture, right      SOCIAL HISTORY  Alcohol: Does not drink  Tobacco: Does not smoke  No Illicit substance use        FAMILY HISTORY: Non contributory to the present illness          MEDICATIONS  (STANDING):  enoxaparin Injectable 30milliGRAM(s) SubCutaneous every 12 hours  docusate sodium 100milliGRAM(s) Oral three times a day  ferrous    sulfate 325milliGRAM(s) Oral three times a day with meals  folic acid 1milliGRAM(s) Oral daily  multivitamin 1Tablet(s) Oral daily  ascorbic acid 500milliGRAM(s) Oral two times a day  metoprolol succinate ER 25milliGRAM(s) Oral daily  mirtazapine 15milliGRAM(s) Oral at bedtime  dextrose 5% + sodium chloride 0.9%. 1000milliLiter(s) IV Continuous <Continuous>  piperacillin/tazobactam IVPB. 3.375Gram(s) IV Intermittent every 8 hours        Vital Signs Last 24 Hrs  T(C): 36.8, Max: 38.8 (05-12 @ 20:06)  T(F): 98.2, Max: 101.8 (05-12 @ 20:06)  HR: 86 (78 - 101)  BP: 120/45 (94/32 - 160/57)  BP(mean): --  RR: 17 (15 - 20)  SpO2: 96% (95% - 99%)      PHYSICAL EXAM:    GENERAL: No tin distress  CVS: s1 and s2 present  RESP: Air entry B/L  GI: abdomen soft and nontender  EXT: Left hip incision site has bandage in placed  CNS: Awake and alert        LABS:                        9.2    30.2  )-----------( 91       ( 13 May 2017 08:05 )             28.6     05-13    144  |  113<H>  |  24<H>  ----------------------------<  112<H>  4.3   |  22  |  1.52<H>    Ca    8.1<L>      13 May 2017 08:05  Phos  3.1       Mg     1.8         TPro  5.1<L>  /  Alb  2.7<L>  /  TBili  0.3  /  DBili  x   /  AST  31  /  ALT  14  /  AlkPhos  61  12      Urinalysis Basic - ( 12 May 2017 15:36 )    Color: Yellow / Appearance: Clear / S.015 / pH: x  Gluc: x / Ketone: Negative  / Bili: Negative / Urobili: Negative   Blood: x / Protein: 30 mg/dL / Nitrite: Negative   Leuk Esterase: Negative / RBC: 0-2 /HPF / WBC 0-2 /HPF   Sq Epi: x / Non Sq Epi: Occasional / Bacteria: x      CAPILLARY BLOOD GLUCOSE    ABG - ( 12 May 2017 14:19 )  pH: 7.40  /  pCO2: 37    /  pO2: 79    / HCO3: 22    / Base Excess: -1.8  /  SaO2: 96          RADIOLOGY:      CXR:  2017  IMPRESSION: Mild diffuse interstitial prominence and scattered airspace opacities, most prominent in the right to mid to lower lung. Findings may be due to  edema or infection.       2017    CT HEAD WITHOUT CONTRAST:  No acute intracranial hemorrhage, mass effect or midline shift. MRI may  be performed for further evaluation as clinically indicated.      MICROBIOLOGY DATA:    Urine Microscopic-Add On (NC) (17 @ 15:36)    Epithelial Cells: Occasional    White Blood Cell - Urine: 0-2 /HPF    Red Blood Cell - Urine: 0-2 /HPF    17 Blood culture: Pending

## 2017-05-16 NOTE — PROGRESS NOTE ADULT - SUBJECTIVE AND OBJECTIVE BOX
PULMONARY  progress note NOTE      GRETCHEN TOURE  MRN-[id number]    Patient is a 89y old  Female who presents with a chief complaint of falls (11 May 2017 11:38)  Feels better No wheeze or cough, Ate well       HISTORY OF PRESENT ILLNESS:    MEDICATIONS  (STANDING):  enoxaparin Injectable 30milliGRAM(s) SubCutaneous every 12 hours  docusate sodium 100milliGRAM(s) Oral three times a day  ferrous    sulfate 325milliGRAM(s) Oral three times a day with meals  folic acid 1milliGRAM(s) Oral daily  multivitamin 1Tablet(s) Oral daily  ascorbic acid 500milliGRAM(s) Oral two times a day  mirtazapine 15milliGRAM(s) Oral at bedtime  piperacillin/tazobactam IVPB. 3.375Gram(s) IV Intermittent every 8 hours  metoprolol Injectable 5milliGRAM(s) IV Push every 6 hours  ALBUTerol    0.083% 2.5milliGRAM(s) Nebulizer every 6 hours  furosemide   Injectable 40milliGRAM(s) IV Push daily  methylPREDNISolone sodium succinate Injectable 40milliGRAM(s) IV Push every 8 hours      MEDICATIONS  (PRN):  acetaminophen   Tablet 650milliGRAM(s) Oral every 6 hours PRN For Temp over 38.3 C (100.94 F)  aluminum hydroxide/magnesium hydroxide/simethicone Suspension 30milliLiter(s) Oral four times a day PRN Indigestion  ondansetron Injectable 4milliGRAM(s) IV Push every 6 hours PRN Nausea and/or Vomiting  oxyCODONE  5 mG/acetaminophen 325 mG 1Tablet(s) Oral every 4 hours PRN Moderate Pain (4 - 6)  oxyCODONE  5 mG/acetaminophen 325 mG 2Tablet(s) Oral every 6 hours PRN Severe Pain (7 - 10)  acetaminophen  Suppository 650milliGRAM(s) Rectal every 4 hours PRN For Temp greater than 38 C (100.4 F)      Allergies    No Known Allergies    Intolerances        PAST MEDICAL & SURGICAL HISTORY:  UTI (urinary tract infection)  Dementia  Hypertension  Depression  HTN (hypertension)  CLL (chronic lymphocytic leukemia)  Femur fracture, right           REVIEW OF SYSTEMS:  CONSTITUTIONAL: No fever, weight loss, or fatigue   EYES: No eye pain, visual disturbances, or discharge  ENMT:  No difficulty hearing, tinnitus, vertigo; No sinus or throat pain  NECK: No pain or stiffness   RESPIRATORY:  cough -   , wheezing -    chills-     hemoptysis-    Shortness of Breath--  CARDIOVASCULAR: No chest pain, palpitations, passing out, dizziness, or leg swelling  GASTROINTESTINAL: No abdominal or epigastric pain. No nausea, vomiting, or hematemesis; No diarrhea or constipation. No melena or hematochezia.  GENITOURINARY: No dysuria, frequency, hematuria, or incontinence  NEUROLOGICAL: No headaches, memory loss, loss of strength, numbness, or tremors  SKIN: No itching, burning, rashes, or lesions   LYMPH Nodes: No enlarged glands  ENDOCRINE: No heat or cold intolerance; No hair loss  MUSCULOSKELETAL: No joint pain or swelling; No muscle, back, or extremity pain  PSYCHIATRIC: No depression, anxiety, mood swings, or difficulty sleeping  HEME/LYMPH: No easy bruising, or bleeding gums  ALLERY AND IMMUNOLOGIC: No hives or eczema    Vital Signs Last 24 Hrs  T(C): 36.6, Max: 37.4 (05-15 @ 10:45)  T(F): 97.9, Max: 99.4 (05-15 @ 10:45)  HR: 80 (70 - 95)  BP: 149/52 (103/54 - 161/71)  BP(mean): --  RR: 18 (16 - 20)  SpO2: 96% (95% - 97%)  I&O's Detail    I & Os for current day (as of 16 May 2017 09:54)  =============================================  IN:    Solution: 100 ml    Total IN: 100 ml  ---------------------------------------------  OUT:    Indwelling Catheter - Urethral: 3100 ml    Total OUT: 3100 ml  ---------------------------------------------  Total NET: -3000 ml      PHYSICAL EXAMINATION:    GENERAL: The patient is a well-developed, well-nourished in no apparent distress.     HEENT: Head is normocephalic and atraumatic. Extraocular muscles are intact. Mucous membranes are moist.   Neck supple ,No LN felt JVP not increased  Cardiovascular: Normal S1 S2, No JVD  systolic  murmur at apex, No gallop  Respiratory: Lungs ves breathing with  rales lt base, No wheeze	  Psychiatry: A & O x 2, Mood & affect appropriate  Gastrointestinal:  Soft, Non-tender,   no hepatomegalyor splenomegaly BS pos  Skin: No rashes, No ecchymoses, No cyanosis	  Extremities: Normal range of motion, No clubbing, cyanosis or edema  Vascular: Peripheral pulses palpable 2+ bilaterally  NEUROLOGIC: Cranial nerves intact  sensoryok [place]  motor dtr 2+  DTR  babinski neg    LABS:                        10.7   23.1  )-----------( 149      ( 16 May 2017 07:13 )             32.0     05-16    144  |  110<H>  |  21<H>  ----------------------------<  192<H>  3.7   |  24  |  1.31<H>    Ca    8.8      16 May 2017 07:13  Phos  3.4     05-16  Mg     1.9     05-16      ECHO  1. Densly calcified mitral valve leaflet, mitral annulus  calcification. Mild mitral regurgitation.  2. Normal trileaflet aortic valve.  3. Normal aortic root.  4. Moderate left atrial enlargement.  5. Normal left ventricular internal dimensions and wall  thicknesses.  6. Normal Left Ventricular Systolic Function,  (EF = 55 to

## 2017-05-16 NOTE — SWALLOW BEDSIDE ASSESSMENT ADULT - SWALLOW EVAL: RECOMMENDED FEEDING/EATING TECHNIQUES
position upright (90 degrees)/maintain upright posture during/after eating for 30 mins/oral hygiene
small sips/bites/allow for swallow between intakes/crush medication (when feasible)/maintain upright posture during/after eating for 30 mins/position upright (90 degrees)/alternate food with liquid/oral hygiene

## 2017-05-16 NOTE — PROGRESS NOTE ADULT - ASSESSMENT
Patient is a 89y old  Female who presents with a chief complaint of falls and found to have Left hip fracture s/p IM left femoral nailing. the post-op course complicated with RRT due to unresponsiveness and found to have fever, T102, multiple right upper and lower lobe opacities, most likely Aspiration pneumonia.    # Left hip fracture- s/p Im nailing  # s/p RRT due to unresponsiveness  # Aspiration pneumonia  # Sepsis - Fever and leukocytosis    Would recommend:  1. Continue zosyn inpatient  2. Aspiration Precaution  3. Monitor WBC count, is trending down  4. May change to oral Augmentin 875mg q 12hours on discharge to continue until 5/22/17    d/w House staff and HHA, daughter at the bed side    Covering Dr. Dang

## 2017-05-16 NOTE — PROGRESS NOTE ADULT - PROBLEM SELECTOR PLAN 2
-patient had RRT on 5/12 due to brief episode of unresponsiveness, likely due to aspiration  -on zosyn day 6, no fever. Has leukocytosis but that may be due to CLL  Discussed with Dr Lorenzo DE LEON, recommend c/w zosyn while in hospital and switch to augmentin upon dc to for 3-4 more days for total 10 days of Abx  -c/w duoneb, IV lasix again today, dc IV steroids per Dr Wiley. Repeat CXr today  Patient tolerating puree diet well, needs assistance with feeding, keep head of bed elevated at 45 degress, aspiration precautions

## 2017-05-16 NOTE — PROGRESS NOTE ADULT - PROBLEM SELECTOR PLAN 1
POD # 6 s/p Lt hip IM nailing for left intertrochanteric fx  Patient is orthopaedically stable.   Continue with physical therapy of left lower extremity with weight bearing as tolerated. Dry dressing changes to left hip wound every 2 days.   Staples to be removed in rehab facility on 5/26/17.   Patient to follow up with Dr. Parsons at [phone] upon d/c from rehab facility.  OOB to chair  Likely Dc am to Rehab

## 2017-05-16 NOTE — SWALLOW BEDSIDE ASSESSMENT ADULT - ORAL PREPARATORY PHASE
reduced bolus formation Reduced oral grading Decreased mastication ability/Reduced oral grading Within functional limits

## 2017-05-16 NOTE — SWALLOW BEDSIDE ASSESSMENT ADULT - SWALLOW EVAL: BUCCAL STRENGTH AND MOBILITY
unable to ascertain d/t poor participation/ comprehension
unable to ascertain d/t poor participation/ comprehension

## 2017-05-16 NOTE — SWALLOW BEDSIDE ASSESSMENT ADULT - ASR SWALLOW REFERRAL
psychology/Risk of malnutrition present/Registered Dietitian
Risk of malnutrition present/Registered Dietitian/psychology

## 2017-05-16 NOTE — SWALLOW BEDSIDE ASSESSMENT ADULT - SWALLOW EVAL: DIAGNOSIS
Pt p/w s&s oropharyngeal dysphagia with psychogenic component c/b oral defensiveness, impaired bolus formation, slow mastication, delayed swallow trigger, and cough on thin liquids. Suspect premature spillage of thin liquids to the pharynx.

## 2017-05-16 NOTE — SWALLOW BEDSIDE ASSESSMENT ADULT - ASR SWALLOW ASPIRATION MONITOR
gurgly voice/cough/change of breathing pattern/position upright (90Y)/oral hygiene/fever/pneumonia/upper respiratory infection/throat clearing
gurgly voice/change of breathing pattern/position upright (90Y)/fever/throat clearing/pneumonia/oral hygiene/upper respiratory infection/cough

## 2017-05-16 NOTE — PROGRESS NOTE ADULT - SUBJECTIVE AND OBJECTIVE BOX
Patient is a 89y old  Female who presents with a chief complaint of falls (11 May 2017 11:38)      INTERVAL HPI/OVERNIGHT EVENTS: patient was short of breath and wheezing, CXR showed vascular congestion, received IV lasix and IV solumedrol and duoneb and improved. Seen by speech and swallow, patient refused to cooperate, patient assessed by myself at bedside with applesauce with HHA present, stated on puree diet and tolerating well    MEDICATIONS  (STANDING):  enoxaparin Injectable 30milliGRAM(s) SubCutaneous every 12 hours  docusate sodium 100milliGRAM(s) Oral three times a day  ferrous    sulfate 325milliGRAM(s) Oral three times a day with meals  folic acid 1milliGRAM(s) Oral daily  multivitamin 1Tablet(s) Oral daily  ascorbic acid 500milliGRAM(s) Oral two times a day  mirtazapine 15milliGRAM(s) Oral at bedtime  piperacillin/tazobactam IVPB. 3.375Gram(s) IV Intermittent every 8 hours  ALBUTerol    0.083% 2.5milliGRAM(s) Nebulizer every 6 hours  furosemide   Injectable 40milliGRAM(s) IV Push daily  metoprolol succinate ER 25milliGRAM(s) Oral daily  docusate sodium 100milliGRAM(s) Oral two times a day  senna 2Tablet(s) Oral at bedtime    MEDICATIONS  (PRN):  acetaminophen   Tablet 650milliGRAM(s) Oral every 6 hours PRN For Temp over 38.3 C (100.94 F)  aluminum hydroxide/magnesium hydroxide/simethicone Suspension 30milliLiter(s) Oral four times a day PRN Indigestion  ondansetron Injectable 4milliGRAM(s) IV Push every 6 hours PRN Nausea and/or Vomiting  oxyCODONE  5 mG/acetaminophen 325 mG 1Tablet(s) Oral every 4 hours PRN Moderate Pain (4 - 6)  oxyCODONE  5 mG/acetaminophen 325 mG 2Tablet(s) Oral every 6 hours PRN Severe Pain (7 - 10)  acetaminophen  Suppository 650milliGRAM(s) Rectal every 4 hours PRN For Temp greater than 38 C (100.4 F)      Allergies    No Known Allergies    Intolerances        REVIEW OF SYSTEMS:  CONSTITUTIONAL: No fever, weight loss, or fatigue  EYES: No eye pain, visual disturbances, or discharge  ENMT:  No difficulty hearing, tinnitus, vertigo; No sinus or throat pain  NECK: No pain or stiffness  BREASTS: No pain, masses, or nipple discharge  RESPIRATORY: No cough, wheezing, chills or hemoptysis; No shortness of breath  CARDIOVASCULAR: No chest pain, palpitations, dizziness, or leg swelling  GASTROINTESTINAL: No abdominal or epigastric pain. No nausea, vomiting, or hematemesis; No diarrhea or constipation. No melena or hematochezia.  GENITOURINARY: No dysuria, frequency, hematuria, or incontinence  NEUROLOGICAL: No headaches, memory loss, loss of strength, numbness, or tremors  SKIN: No itching, burning, rashes, or lesions   LYMPH NODES: No enlarged glands  ENDOCRINE: No heat or cold intolerance; No hair loss  MUSCULOSKELETAL: No joint pain or swelling; No muscle, back, or extremity pain  PSYCHIATRIC: No depression, anxiety, mood swings, or difficulty sleeping  HEME/LYMPH: No easy bruising, or bleeding gums  ALLERGY AND IMMUNOLOGIC: No hives or eczema    Vital Signs Last 24 Hrs  T(C): 36.6, Max: 36.6 (05-15 @ 23:35)  T(F): 97.9, Max: 97.9 (05-16 @ 05:40)  HR: 80 (70 - 95)  BP: 149/52 (103/54 - 161/71)  BP(mean): --  RR: 18 (18 - 20)  SpO2: 96% (95% - 97%)    PHYSICAL EXAM:  GENERAL: NAD, well-groomed, well-developed  HEAD:  Atraumatic, Normocephalic  EYES: EOMI, PERRLA, conjunctiva and sclera clear  ENMT: No tonsillar erythema, exudates, or enlargement; Moist mucous membranes, Good dentition, No lesions  NECK: Supple, No JVD, Normal thyroid  NERVOUS SYSTEM:  Alert & Oriented X3, Good concentration; Motor Strength 5/5 B/L upper and lower extremities; DTRs 2+ intact and symmetric  CHEST/LUNG: basal inspiratory crackles, no wheezing  HEART: Regular rate and rhythm; No murmurs, rubs, or gallops  ABDOMEN: Soft, Nontender, Nondistended; Bowel sounds present  EXTREMITIES:  2+ Peripheral Pulses, No clubbing, cyanosis, or edema, left hip surgical site clean and dry, nontender  LYMPH: No lymphadenopathy noted  SKIN: No rashes or lesions    LABS:                        10.7   23.1  )-----------( 149      ( 16 May 2017 07:13 )             32.0     05-16    144  |  110<H>  |  21<H>  ----------------------------<  192<H>  3.7   |  24  |  1.31<H>    Ca    8.8      16 May 2017 07:13  Phos  3.4     05-16  Mg     1.9     05-16          CAPILLARY BLOOD GLUCOSE  129 (16 May 2017 07:59)      RADIOLOGY & ADDITIONAL TESTS:    Imaging Personally Reviewed    Consultant(s) Notes Reviewed    Care Discussed with Consultants/Other Providers

## 2017-05-16 NOTE — SWALLOW BEDSIDE ASSESSMENT ADULT - ORAL PHASE
Decreased anterior-posterior movement of the bolus/Delayed oral transit time Delayed oral transit time/Decreased anterior-posterior movement of the bolus Decreased anterior-posterior movement of the bolus/Lingual stasis/Delayed oral transit time

## 2017-05-16 NOTE — SWALLOW BEDSIDE ASSESSMENT ADULT - COMMENTS
Received in bed, AA+Ox2 & confused, HHA at bedside. HHA reports semi-frequent cough on various consistencies. Pt is agitated and combative in response to PO presentation. Accepted one 1 trial of solid PO, but refused to allow student clinician to palpate swallow, & refused all subsequent PO trials. Current swallow function cannot be ascertained at this time.
Pt received OOB, seated upright in chair for exam. Daughter & HHA present, Dtr provided translation in Chinese as needed. Limited PO acceptance, Pt was resistant to intake even when offered by Dtr/HHA.

## 2017-05-16 NOTE — SWALLOW BEDSIDE ASSESSMENT ADULT - SWALLOW EVAL: RECOMMENDED DIET
Unable to make diet recommendations at this time 2/2 pt refusal to take sufficient PO trials for swallow evaluation. Therefore, will defer PO diet to medical team at this time.
dysphagia mechanical soft diet with nectar thick liquids

## 2017-05-17 LAB
ANION GAP SERPL CALC-SCNC: 10 MMOL/L — SIGNIFICANT CHANGE UP (ref 5–17)
ANION GAP SERPL CALC-SCNC: 9 MMOL/L — SIGNIFICANT CHANGE UP (ref 5–17)
BASOPHILS # BLD AUTO: 0.3 K/UL — HIGH (ref 0–0.2)
BASOPHILS NFR BLD AUTO: 1.2 % — SIGNIFICANT CHANGE UP (ref 0–2)
BUN SERPL-MCNC: 34 MG/DL — HIGH (ref 7–18)
BUN SERPL-MCNC: 36 MG/DL — HIGH (ref 7–18)
CALCIUM SERPL-MCNC: 8.5 MG/DL — SIGNIFICANT CHANGE UP (ref 8.4–10.5)
CALCIUM SERPL-MCNC: 8.5 MG/DL — SIGNIFICANT CHANGE UP (ref 8.4–10.5)
CHLORIDE SERPL-SCNC: 110 MMOL/L — HIGH (ref 96–108)
CHLORIDE SERPL-SCNC: 110 MMOL/L — HIGH (ref 96–108)
CO2 SERPL-SCNC: 26 MMOL/L — SIGNIFICANT CHANGE UP (ref 22–31)
CO2 SERPL-SCNC: 28 MMOL/L — SIGNIFICANT CHANGE UP (ref 22–31)
CREAT SERPL-MCNC: 1.49 MG/DL — HIGH (ref 0.5–1.3)
CREAT SERPL-MCNC: 1.64 MG/DL — HIGH (ref 0.5–1.3)
EOSINOPHIL # BLD AUTO: 0 K/UL — SIGNIFICANT CHANGE UP (ref 0–0.5)
EOSINOPHIL NFR BLD AUTO: 0 % — SIGNIFICANT CHANGE UP (ref 0–6)
GLUCOSE SERPL-MCNC: 116 MG/DL — HIGH (ref 70–99)
GLUCOSE SERPL-MCNC: 141 MG/DL — HIGH (ref 70–99)
HCT VFR BLD CALC: 27.5 % — LOW (ref 34.5–45)
HGB BLD-MCNC: 9.2 G/DL — LOW (ref 11.5–15.5)
LYMPHOCYTES # BLD AUTO: 16.7 K/UL — HIGH (ref 1–3.3)
LYMPHOCYTES # BLD AUTO: 63.3 % — HIGH (ref 13–44)
MAGNESIUM SERPL-MCNC: 2 MG/DL — SIGNIFICANT CHANGE UP (ref 1.6–2.6)
MCHC RBC-ENTMCNC: 31.9 PG — SIGNIFICANT CHANGE UP (ref 27–34)
MCHC RBC-ENTMCNC: 33.3 GM/DL — SIGNIFICANT CHANGE UP (ref 32–36)
MCV RBC AUTO: 95.8 FL — SIGNIFICANT CHANGE UP (ref 80–100)
MONOCYTES # BLD AUTO: 0.9 K/UL — SIGNIFICANT CHANGE UP (ref 0–0.9)
MONOCYTES NFR BLD AUTO: 3.5 % — SIGNIFICANT CHANGE UP (ref 2–14)
NEUTROPHILS # BLD AUTO: 8.4 K/UL — HIGH (ref 1.8–7.4)
NEUTROPHILS NFR BLD AUTO: 32 % — LOW (ref 43–77)
PHOSPHATE SERPL-MCNC: 2 MG/DL — LOW (ref 2.5–4.5)
PLATELET # BLD AUTO: 180 K/UL — SIGNIFICANT CHANGE UP (ref 150–400)
POTASSIUM SERPL-MCNC: 2.9 MMOL/L — CRITICAL LOW (ref 3.5–5.3)
POTASSIUM SERPL-MCNC: 4 MMOL/L — SIGNIFICANT CHANGE UP (ref 3.5–5.3)
POTASSIUM SERPL-SCNC: 2.9 MMOL/L — CRITICAL LOW (ref 3.5–5.3)
POTASSIUM SERPL-SCNC: 4 MMOL/L — SIGNIFICANT CHANGE UP (ref 3.5–5.3)
RBC # BLD: 2.88 M/UL — LOW (ref 3.8–5.2)
RBC # FLD: 12.9 % — SIGNIFICANT CHANGE UP (ref 10.3–14.5)
SODIUM SERPL-SCNC: 145 MMOL/L — SIGNIFICANT CHANGE UP (ref 135–145)
SODIUM SERPL-SCNC: 148 MMOL/L — HIGH (ref 135–145)
WBC # BLD: 26.4 K/UL — HIGH (ref 3.8–10.5)
WBC # FLD AUTO: 26.4 K/UL — HIGH (ref 3.8–10.5)

## 2017-05-17 RX ORDER — POTASSIUM CHLORIDE 20 MEQ
20 PACKET (EA) ORAL
Qty: 0 | Refills: 0 | Status: COMPLETED | OUTPATIENT
Start: 2017-05-17 | End: 2017-05-17

## 2017-05-17 RX ORDER — SODIUM,POTASSIUM PHOSPHATES 278-250MG
1 POWDER IN PACKET (EA) ORAL
Qty: 0 | Refills: 0 | Status: COMPLETED | OUTPATIENT
Start: 2017-05-17 | End: 2017-05-18

## 2017-05-17 RX ORDER — POTASSIUM CHLORIDE 20 MEQ
10 PACKET (EA) ORAL
Qty: 0 | Refills: 0 | Status: COMPLETED | OUTPATIENT
Start: 2017-05-17 | End: 2017-05-17

## 2017-05-17 RX ADMIN — ALBUTEROL 2.5 MILLIGRAM(S): 90 AEROSOL, METERED ORAL at 20:44

## 2017-05-17 RX ADMIN — Medication 1 TABLET(S): at 17:48

## 2017-05-17 RX ADMIN — PIPERACILLIN AND TAZOBACTAM 25 GRAM(S): 4; .5 INJECTION, POWDER, LYOPHILIZED, FOR SOLUTION INTRAVENOUS at 14:04

## 2017-05-17 RX ADMIN — ENOXAPARIN SODIUM 30 MILLIGRAM(S): 100 INJECTION SUBCUTANEOUS at 17:47

## 2017-05-17 RX ADMIN — Medication 500 MILLIGRAM(S): at 17:48

## 2017-05-17 RX ADMIN — Medication 100 MILLIEQUIVALENT(S): at 13:36

## 2017-05-17 RX ADMIN — Medication 40 MILLIGRAM(S): at 06:16

## 2017-05-17 RX ADMIN — Medication 100 MILLIEQUIVALENT(S): at 11:08

## 2017-05-17 RX ADMIN — ALBUTEROL 2.5 MILLIGRAM(S): 90 AEROSOL, METERED ORAL at 15:01

## 2017-05-17 RX ADMIN — Medication 325 MILLIGRAM(S): at 17:48

## 2017-05-17 RX ADMIN — SENNA PLUS 2 TABLET(S): 8.6 TABLET ORAL at 21:55

## 2017-05-17 RX ADMIN — Medication 100 MILLIEQUIVALENT(S): at 13:01

## 2017-05-17 RX ADMIN — Medication 100 MILLIGRAM(S): at 06:16

## 2017-05-17 RX ADMIN — Medication 20 MILLIEQUIVALENT(S): at 14:02

## 2017-05-17 RX ADMIN — Medication 20 MILLIEQUIVALENT(S): at 11:09

## 2017-05-17 RX ADMIN — Medication 25 MILLIGRAM(S): at 06:17

## 2017-05-17 RX ADMIN — MIRTAZAPINE 15 MILLIGRAM(S): 45 TABLET, ORALLY DISINTEGRATING ORAL at 21:55

## 2017-05-17 RX ADMIN — Medication 1 TABLET(S): at 21:55

## 2017-05-17 RX ADMIN — ALBUTEROL 2.5 MILLIGRAM(S): 90 AEROSOL, METERED ORAL at 08:55

## 2017-05-17 RX ADMIN — Medication 325 MILLIGRAM(S): at 08:42

## 2017-05-17 RX ADMIN — PIPERACILLIN AND TAZOBACTAM 25 GRAM(S): 4; .5 INJECTION, POWDER, LYOPHILIZED, FOR SOLUTION INTRAVENOUS at 06:16

## 2017-05-17 RX ADMIN — Medication 500 MILLIGRAM(S): at 06:16

## 2017-05-17 RX ADMIN — Medication 325 MILLIGRAM(S): at 11:32

## 2017-05-17 RX ADMIN — Medication 1 TABLET(S): at 11:31

## 2017-05-17 RX ADMIN — ENOXAPARIN SODIUM 30 MILLIGRAM(S): 100 INJECTION SUBCUTANEOUS at 06:16

## 2017-05-17 RX ADMIN — PIPERACILLIN AND TAZOBACTAM 25 GRAM(S): 4; .5 INJECTION, POWDER, LYOPHILIZED, FOR SOLUTION INTRAVENOUS at 21:55

## 2017-05-17 RX ADMIN — Medication 1 MILLIGRAM(S): at 11:32

## 2017-05-17 NOTE — PROGRESS NOTE ADULT - SUBJECTIVE AND OBJECTIVE BOX
feel better  no pain or fever  able to eat  platelet better                      9.2    26.4  )-----------( 180      ( 17 May 2017 08:03 )             27.5   CLL stable  on antibiotics

## 2017-05-17 NOTE — PROGRESS NOTE ADULT - ASSESSMENT
Astrh Bronchitis r/o cardiac asthma  Pneumonia Rt CAP vs chf resolved  Htn with MR r/o CHF  Depression /dementia  Fall,anemia    duoneb 1 dose qid  Mg and po4 supplement Astrh Bronchitis r/o cardiac asthma  Pneumonia Rt CAP vs chf resolved  Htn with MR r/o CHF  Depression /dementia  Fall,anemia    duoneb 1 dose qid  Mg and po4 supplement,  Kcl supplement ,Increased fluids

## 2017-05-17 NOTE — PROGRESS NOTE ADULT - PROBLEM SELECTOR PLAN 6
Improving, likely due to sepsis/post op state  Hem onc Dr Shen
Improving, likely due to sepsis/post op state  Hem onc Dr Shen

## 2017-05-17 NOTE — PROGRESS NOTE ADULT - PROBLEM SELECTOR PROBLEM 3
Essential hypertension
Late onset Alzheimer's disease with behavioral disturbance
Essential hypertension

## 2017-05-17 NOTE — PROGRESS NOTE ADULT - PROBLEM SELECTOR PROBLEM 2
Aspiration pneumonia of right upper lobe due to gastric secretions
Essential hypertension
Aspiration pneumonia of right upper lobe due to gastric secretions

## 2017-05-17 NOTE — PROGRESS NOTE ADULT - PROBLEM SELECTOR PLAN 1
POD # 6 s/p Lt hip IM nailing for left intertrochanteric fx  Patient is orthopaedically stable.   Continue with physical therapy of left lower extremity with weight bearing as tolerated. Dry dressing changes to left hip wound every 2 days.   Staples to be removed in rehab facility on 5/26/17.   Patient to follow up with Dr. Parsons at [phone] upon d/c from rehab facility.  OOB to chair  Likely Dc am to Rehab POD # 7 s/p Lt hip IM nailing for left intertrochanteric fx  Patient is orthopaedically stable.   Continue with physical therapy of left lower extremity with weight bearing as tolerated. Dry dressing changes to left hip wound every 2 days.   Staples to be removed in rehab facility on 5/26/17.   Patient to follow up with Dr. Parsons at [phone] upon d/c from rehab facility.  OOB to chair  Likely Dc am to Rehab

## 2017-05-17 NOTE — PROGRESS NOTE ADULT - PROBLEM SELECTOR PLAN 7
DVt ppx Patient has hypokalemia likely secondary to diuresis with lasix, will replace IV and PO and f/u BMP  Likely dc plan am

## 2017-05-17 NOTE — PROGRESS NOTE ADULT - SUBJECTIVE AND OBJECTIVE BOX
Patient is a 89y old  Female who presents with a chief complaint of falls (11 May 2017 11:38)      INTERVAL HPI/OVERNIGHT EVENTS:    MEDICATIONS  (STANDING):  enoxaparin Injectable 30milliGRAM(s) SubCutaneous every 12 hours  docusate sodium 100milliGRAM(s) Oral three times a day  ferrous    sulfate 325milliGRAM(s) Oral three times a day with meals  folic acid 1milliGRAM(s) Oral daily  multivitamin 1Tablet(s) Oral daily  ascorbic acid 500milliGRAM(s) Oral two times a day  mirtazapine 15milliGRAM(s) Oral at bedtime  piperacillin/tazobactam IVPB. 3.375Gram(s) IV Intermittent every 8 hours  ALBUTerol    0.083% 2.5milliGRAM(s) Nebulizer every 6 hours  metoprolol succinate ER 25milliGRAM(s) Oral daily  docusate sodium 100milliGRAM(s) Oral two times a day  senna 2Tablet(s) Oral at bedtime  potassium chloride  10 mEq/100 mL IVPB 10milliEquivalent(s) IV Intermittent every 1 hour  potassium chloride   Powder 20milliEquivalent(s) Oral every 2 hours    MEDICATIONS  (PRN):  acetaminophen   Tablet 650milliGRAM(s) Oral every 6 hours PRN For Temp over 38.3 C (100.94 F)  aluminum hydroxide/magnesium hydroxide/simethicone Suspension 30milliLiter(s) Oral four times a day PRN Indigestion  ondansetron Injectable 4milliGRAM(s) IV Push every 6 hours PRN Nausea and/or Vomiting  oxyCODONE  5 mG/acetaminophen 325 mG 1Tablet(s) Oral every 4 hours PRN Moderate Pain (4 - 6)  oxyCODONE  5 mG/acetaminophen 325 mG 2Tablet(s) Oral every 6 hours PRN Severe Pain (7 - 10)  acetaminophen  Suppository 650milliGRAM(s) Rectal every 4 hours PRN For Temp greater than 38 C (100.4 F)  morphine  - Injectable 2milliGRAM(s) IV Push two times a day PRN Moderate Pain (4 - 6)  melatonin 3milliGRAM(s) Oral at bedtime PRN Insomnia      Allergies    No Known Allergies    Intolerances        REVIEW OF SYSTEMS:  CONSTITUTIONAL: No fever, weight loss, or fatigue  EYES: No eye pain, visual disturbances, or discharge  ENMT:  No difficulty hearing, tinnitus, vertigo; No sinus or throat pain  NECK: No pain or stiffness  BREASTS: No pain, masses, or nipple discharge  RESPIRATORY: No cough, wheezing, chills or hemoptysis; No shortness of breath  CARDIOVASCULAR: No chest pain, palpitations, dizziness, or leg swelling  GASTROINTESTINAL: No abdominal or epigastric pain. No nausea, vomiting, or hematemesis; No diarrhea or constipation. No melena or hematochezia.  GENITOURINARY: No dysuria, frequency, hematuria, or incontinence  NEUROLOGICAL: No headaches, memory loss, loss of strength, numbness, or tremors  SKIN: No itching, burning, rashes, or lesions   LYMPH NODES: No enlarged glands  ENDOCRINE: No heat or cold intolerance; No hair loss  MUSCULOSKELETAL: No joint pain or swelling; No muscle, back, or extremity pain  PSYCHIATRIC: No depression, anxiety, mood swings, or difficulty sleeping  HEME/LYMPH: No easy bruising, or bleeding gums  ALLERGY AND IMMUNOLOGIC: No hives or eczema    Vital Signs Last 24 Hrs  T(C): 36.4, Max: 37 (05-16 @ 23:21)  T(F): 97.6, Max: 98.6 (05-16 @ 23:21)  HR: 81 (81 - 96)  BP: 127/60 (114/45 - 130/60)  BP(mean): --  RR: 16 (16 - 18)  SpO2: 98% (95% - 98%)    PHYSICAL EXAM:  GENERAL: NAD, well-groomed, well-developed  HEAD:  Atraumatic, Normocephalic  EYES: EOMI, PERRLA, conjunctiva and sclera clear  ENMT: No tonsillar erythema, exudates, or enlargement; Moist mucous membranes, Good dentition, No lesions  NECK: Supple, No JVD, Normal thyroid  NERVOUS SYSTEM:  Alert & Oriented X3, Good concentration; Motor Strength 5/5 B/L upper and lower extremities; DTRs 2+ intact and symmetric  CHEST/LUNG: Clear to percussion bilaterally; No rales, rhonchi, wheezing, or rubs  HEART: Regular rate and rhythm; No murmurs, rubs, or gallops  ABDOMEN: Soft, Nontender, Nondistended; Bowel sounds present  EXTREMITIES:  2+ Peripheral Pulses, No clubbing, cyanosis, or edema  LYMPH: No lymphadenopathy noted  SKIN: No rashes or lesions    LABS:                        9.2    26.4  )-----------( 180      ( 17 May 2017 08:03 )             27.5     05-17    148<H>  |  110<H>  |  34<H>  ----------------------------<  141<H>  2.9<LL>   |  28  |  1.49<H>    Ca    8.5      17 May 2017 08:03  Phos  3.4     05-16  Mg     1.9     05-16          CAPILLARY BLOOD GLUCOSE  96 (17 May 2017 08:15)      RADIOLOGY & ADDITIONAL TESTS:    Imaging Personally Reviewed    Consultant(s) Notes Reviewed    Care Discussed with Consultants/Other Providers Patient is a 89y old  Female who presents with a chief complaint of falls (11 May 2017 11:38)      INTERVAL HPI/OVERNIGHT EVENTS: FC removed, passed TOV    MEDICATIONS  (STANDING):  enoxaparin Injectable 30milliGRAM(s) SubCutaneous every 12 hours  docusate sodium 100milliGRAM(s) Oral three times a day  ferrous    sulfate 325milliGRAM(s) Oral three times a day with meals  folic acid 1milliGRAM(s) Oral daily  multivitamin 1Tablet(s) Oral daily  ascorbic acid 500milliGRAM(s) Oral two times a day  mirtazapine 15milliGRAM(s) Oral at bedtime  piperacillin/tazobactam IVPB. 3.375Gram(s) IV Intermittent every 8 hours  ALBUTerol    0.083% 2.5milliGRAM(s) Nebulizer every 6 hours  metoprolol succinate ER 25milliGRAM(s) Oral daily  docusate sodium 100milliGRAM(s) Oral two times a day  senna 2Tablet(s) Oral at bedtime  potassium chloride  10 mEq/100 mL IVPB 10milliEquivalent(s) IV Intermittent every 1 hour  potassium chloride   Powder 20milliEquivalent(s) Oral every 2 hours    MEDICATIONS  (PRN):  acetaminophen   Tablet 650milliGRAM(s) Oral every 6 hours PRN For Temp over 38.3 C (100.94 F)  aluminum hydroxide/magnesium hydroxide/simethicone Suspension 30milliLiter(s) Oral four times a day PRN Indigestion  ondansetron Injectable 4milliGRAM(s) IV Push every 6 hours PRN Nausea and/or Vomiting  oxyCODONE  5 mG/acetaminophen 325 mG 1Tablet(s) Oral every 4 hours PRN Moderate Pain (4 - 6)  oxyCODONE  5 mG/acetaminophen 325 mG 2Tablet(s) Oral every 6 hours PRN Severe Pain (7 - 10)  acetaminophen  Suppository 650milliGRAM(s) Rectal every 4 hours PRN For Temp greater than 38 C (100.4 F)  morphine  - Injectable 2milliGRAM(s) IV Push two times a day PRN Moderate Pain (4 - 6)  melatonin 3milliGRAM(s) Oral at bedtime PRN Insomnia      Allergies    No Known Allergies    Intolerances      Vital Signs Last 24 Hrs  T(C): 36.4, Max: 37 (05-16 @ 23:21)  T(F): 97.6, Max: 98.6 (05-16 @ 23:21)  HR: 81 (81 - 96)  BP: 127/60 (114/45 - 130/60)  BP(mean): --  RR: 16 (16 - 18)  SpO2: 98% (95% - 98%)    PHYSICAL EXAM:  GENERAL: NAD, well-groomed, well-developed  HEAD:  Atraumatic, Normocephalic  EYES: EOMI, PERRLA, conjunctiva and sclera clear  ENMT: No tonsillar erythema, exudates, or enlargement; Moist mucous membranes, Good dentition, No lesions  NECK: Supple, No JVD, Normal thyroid  NERVOUS SYSTEM:  Alert & Oriented X1, Good concentration; Motor Strength 5/5 B/L upper and lower extremities; DTRs 2+ intact and symmetric  CHEST/LUNG: no wheezing or crackles  HEART: Regular rate and rhythm; No murmurs, rubs, or gallops  ABDOMEN: Soft, Nontender, Nondistended; Bowel sounds present  EXTREMITIES:left hip surgical site clean and dry  LYMPH: No lymphadenopathy noted  SKIN: No rashes or lesions    LABS:                        9.2    26.4  )-----------( 180      ( 17 May 2017 08:03 )             27.5     05-17    148<H>  |  110<H>  |  34<H>  ----------------------------<  141<H>  2.9<LL>   |  28  |  1.49<H>    Ca    8.5      17 May 2017 08:03  Phos  3.4     05-16  Mg     1.9     05-16          CAPILLARY BLOOD GLUCOSE  96 (17 May 2017 08:15)      RADIOLOGY & ADDITIONAL TESTS:    Imaging Personally Reviewed    Consultant(s) Notes Reviewed    Care Discussed with Consultants/Other Providers

## 2017-05-17 NOTE — PROGRESS NOTE ADULT - PROBLEM SELECTOR PLAN 3
c/w metoprolol 25 mg od
c/w metoprolol 25 mg od
pt takes Mirtazapine 22.5 mg at HS   c/w home medications

## 2017-05-17 NOTE — DIETITIAN INITIAL EVALUATION ADULT. - OTHER INFO
nutrition assessment for length of stay; lives home alone with HHA help; no pressure ulcer; s/p RRT 5/12/17 due to aspiration, s/p swallow re-evaluation 5/16/17 with mechanical soft diet, nectar thicken liquid recommended, tolerating 100% of breakfast today reported, food choices obtained; for rehab placement noted

## 2017-05-17 NOTE — DIETITIAN INITIAL EVALUATION ADULT. - FACTORS AFF FOOD INTAKE
difficulty swallowing/difficulty with food procurement/preparation/change in mental status/difficulty chewing

## 2017-05-17 NOTE — PROGRESS NOTE ADULT - SUBJECTIVE AND OBJECTIVE BOX
PULMONARY  progress note    GRETCHEN TOURE  MRN-[id number]    Patient is a 89y old  Female who presents with a chief complaint of falls  Feels better no cough or SOB      HISTORY OF PRESENT ILLNESS:    MEDICATIONS  (STANDING):  enoxaparin Injectable 30milliGRAM(s) SubCutaneous every 12 hours  docusate sodium 100milliGRAM(s) Oral three times a day  ferrous    sulfate 325milliGRAM(s) Oral three times a day with meals  folic acid 1milliGRAM(s) Oral daily  multivitamin 1Tablet(s) Oral daily  ascorbic acid 500milliGRAM(s) Oral two times a day  mirtazapine 15milliGRAM(s) Oral at bedtime  piperacillin/tazobactam IVPB. 3.375Gram(s) IV Intermittent every 8 hours  ALBUTerol    0.083% 2.5milliGRAM(s) Nebulizer every 6 hours  metoprolol succinate ER 25milliGRAM(s) Oral daily  docusate sodium 100milliGRAM(s) Oral two times a day  senna 2Tablet(s) Oral at bedtime      MEDICATIONS  (PRN):  acetaminophen   Tablet 650milliGRAM(s) Oral every 6 hours PRN For Temp over 38.3 C (100.94 F)  aluminum hydroxide/magnesium hydroxide/simethicone Suspension 30milliLiter(s) Oral four times a day PRN Indigestion  ondansetron Injectable 4milliGRAM(s) IV Push every 6 hours PRN Nausea and/or Vomiting  oxyCODONE  5 mG/acetaminophen 325 mG 1Tablet(s) Oral every 4 hours PRN Moderate Pain (4 - 6)  oxyCODONE  5 mG/acetaminophen 325 mG 2Tablet(s) Oral every 6 hours PRN Severe Pain (7 - 10)  acetaminophen  Suppository 650milliGRAM(s) Rectal every 4 hours PRN For Temp greater than 38 C (100.4 F)  morphine  - Injectable 2milliGRAM(s) IV Push two times a day PRN Moderate Pain (4 - 6)  melatonin 3milliGRAM(s) Oral at bedtime PRN Insomnia      Allergies    No Known Allergies    Intolerances        PAST MEDICAL & SURGICAL HISTORY:  UTI (urinary tract infection)  Dementia  Hypertension  Depression  HTN (hypertension)  CLL (chronic lymphocytic leukemia)  Femur fracture, right           REVIEW OF SYSTEMS:  CONSTITUTIONAL: No fever, weight loss, or fatigue   EYES: No eye pain, visual disturbances, or discharge  ENMT:  No difficulty hearing, tinnitus, vertigo; No sinus or throat pain  NECK: No pain or stiffness   RESPIRATORY:  cough -   , wheezing-     chills-     hemoptysis-    Shortness of Breath-  CARDIOVASCULAR: No chest pain, palpitations, passing out, dizziness, or leg swelling  GASTROINTESTINAL: No abdominal or epigastric pain. No nausea, vomiting, or hematemesis; No diarrhea or constipation. No melena or hematochezia.  GENITOURINARY: No dysuria, frequency, hematuria, or incontinence  NEUROLOGICAL: No headaches, memory loss, loss of strength, numbness, or tremors  SKIN: No itching, burning, rashes, or lesions   LYMPH Nodes: No enlarged glands  ENDOCRINE: No heat or cold intolerance; No hair loss  MUSCULOSKELETAL: No joint pain or swelling; No muscle, back, or extremity pain  PSYCHIATRIC: No depression, anxiety, mood swings, or difficulty sleeping  HEME/LYMPH: No easy bruising, or bleeding gums  ALLERY AND IMMUNOLOGIC: No hives or eczema    Vital Signs Last 24 Hrs  T(C): 36.4, Max: 37 (05-16 @ 23:21)  T(F): 97.6, Max: 98.6 (05-16 @ 23:21)  HR: 81 (81 - 96)  BP: 127/60 (114/45 - 130/60)  BP(mean): --  RR: 16 (16 - 18)  SpO2: 98% (95% - 98%)  I&O's Detail    I & Os for current day (as of 17 May 2017 09:04)  =============================================  IN:    Total IN: 0 ml  ---------------------------------------------  OUT:    Indwelling Catheter - Urethral: 700 ml    Total OUT: 700 ml  ---------------------------------------------  Total NET: -700 ml      PHYSICAL EXAMINATION:    GENERAL: The patient is a well-developed, well-nourished in no apparent distress.     HEENT: Head is normocephalic and atraumatic. Extraocular muscles are intact. Mucous membranes are moist.   Neck supple ,No LN felt JVP not increased  Cardiovascular: Normal S1 S2, No JVD  systolic  murmur at apex, No gallop  Respiratory: Lungs ves breathing with  rales.rt base,no wheeze	  Psychiatry:, Mood & affect appropriate  Gastrointestinal:  Soft, Non-tender,   no hepatomegaly or splenomegaly BS pos  Skin: No rashes, No ecchymoses, No cyanosis	  Extremities: Normal range of motion, No clubbing, cyanosis or edema  Vascular: Peripheral pulses palpable 2+ bilaterally  NEUROLOGIC: Cranial nerves intact  sensory  motor dtr 2+  DTR  babinski neg    LABS:                        9.2    26.4  )-----------( 180      ( 17 May 2017 08:03 )             27.5     05-17    148<H>  |  110<H>  |  34<H>  ----------------------------<  141<H>  2.9<LL>   |  28  |  1.49<H>    Ca    8.5      17 May 2017 08:03  Phos  3.4     05-16  Mg     1.9     05-16                          MICROBIOLOGY:    RADIOLOGY & ADDITIONAL STUDIES:    CXR:

## 2017-05-17 NOTE — DIETITIAN INITIAL EVALUATION ADULT. - MD RECOMMEND
Add Ensure Pudding 120ml x tid (510kcal 12g protein) if medically feasible as needed if poor po intake

## 2017-05-17 NOTE — PROGRESS NOTE ADULT - PROBLEM SELECTOR PLAN 2
-patient had RRT on 5/12 due to brief episode of unresponsiveness, likely due to aspiration  -on zosyn day 6, no fever. Has leukocytosis but that may be due to CLL  Discussed with Dr Lorenzo DE LEON, recommend c/w zosyn while in hospital and switch to augmentin upon dc to for 3-4 more days for total 10 days of Abx  -c/w duoneb, IV lasix again today, dc IV steroids per Dr Wiley. Repeat CXr today  Patient tolerating puree diet well, needs assistance with feeding, keep head of bed elevated at 45 degress, aspiration precautions -patient had RRT on 5/12 due to brief episode of unresponsiveness, likely due to aspiration  -on zosyn day 6, no fever. Has leukocytosis but that may be due to CLL  Discussed with Dr Lorenzo DE LEON, recommend c/w zosyn while in hospital and switch to augmentin upon dc to for 3-4 more days for total 10 days of Abx  -c/w mechanical soft diet per swallow eval

## 2017-05-17 NOTE — DIETITIAN INITIAL EVALUATION ADULT. - DIET TYPE
dysphagia 2, mechanical soft, nectar consistency fld/DASH/TLC (sodium and cholesterol restricted diet)

## 2017-05-17 NOTE — DIETITIAN INITIAL EVALUATION ADULT. - NUTRITION INTERVENTION
Medical Food Supplements/Feeding Assistance/Discharge and Transfer of Nutrition Care to New Setting/Meals and Snack/Collaboration and Referral of Nutrition Care

## 2017-05-17 NOTE — PROGRESS NOTE ADULT - PROBLEM SELECTOR PROBLEM 1
Fracture of hip, unspecified laterality, closed, with delayed healing, subsequent encounter
Closed fracture of left hip, initial encounter
Fracture of hip, unspecified laterality, closed, with delayed healing, subsequent encounter
Closed fracture of left hip, initial encounter

## 2017-05-17 NOTE — PROGRESS NOTE ADULT - ASSESSMENT
Patient is a 89y old  Female who presents with a chief complaint of falls and found to have Left hip fracture s/p IM left femoral nailing. the post-op course complicated with RRT due to unresponsiveness and found to have fever, T102, multiple right upper and lower lobe opacities, most likely Aspiration pneumonia.    # Left hip fracture- s/p Im nailing  # s/p RRT due to unresponsiveness  # Aspiration pneumonia  # Sepsis - Fever and leukocytosis    Would recommend:  1. Continue zosyn inpatient  2. Aspiration Precaution  3. Monitor WBC count, is trending down  4. May change to oral Augmentin 875mg q 12hours on discharge to continue until 5/22/17    d/w House staff and HHA, daughter at the bed side    Covering Dr. Laurence Ventura

## 2017-05-17 NOTE — PROGRESS NOTE ADULT - SUBJECTIVE AND OBJECTIVE BOX
Patient is seen and examined at the bed side, remains afebrile. She is doing well and tolerating puree diet.  The  WBC count stay elevated.      REVIEW OF SYSTEMS: All other review systems are negative      Vital Signs Last 24 Hrs  T(C): 36.2, Max: 37 (05-16 @ 23:21)  T(F): 97.2, Max: 98.6 (05-16 @ 23:21)  HR: 87 (81 - 96)  BP: 152/70 (115/65 - 152/70)  BP(mean): --  RR: 18 (16 - 18)  SpO2: 98% (95% - 98%)      GENERAL: sitting up on a chair  CVS: s1 and s2 present  RESP: Air entry B/L  GI: abdomen soft and nontender  EXT: Left hip incision site has bandage in placed  CNS: Awake and alert      MEDICATIONS  (STANDING):  enoxaparin Injectable 30milliGRAM(s) SubCutaneous every 12 hours  ferrous    sulfate 325milliGRAM(s) Oral three times a day with meals  folic acid 1milliGRAM(s) Oral daily  multivitamin 1Tablet(s) Oral daily  ascorbic acid 500milliGRAM(s) Oral two times a day  mirtazapine 15milliGRAM(s) Oral at bedtime  piperacillin/tazobactam IVPB. 3.375Gram(s) IV Intermittent every 8 hours  ALBUTerol    0.083% 2.5milliGRAM(s) Nebulizer every 6 hours  metoprolol succinate ER 25milliGRAM(s) Oral daily  docusate sodium 100milliGRAM(s) Oral two times a day  senna 2Tablet(s) Oral at bedtime  potassium acid phosphate/sodium acid phosphate tablet (K-PHOS No. 2) 1Tablet(s) Oral four times a day with meals          Allergies    No Known Allergies        LABS:                                        9.2    26.4  )-----------( 180      ( 17 May 2017 08:03 )             27.5       05-    145  |  110<H>  |  36<H>  ----------------------------<  116<H>  4.0   |  26  |  1.64<H>    Ca    8.5      17 May 2017 15:29  Phos  2.0     05-  Mg     2.0               Urinalysis Basic - ( 12 May 2017 15:36 )    Color: Yellow / Appearance: Clear / S.015 / pH: x  Gluc: x / Ketone: Negative  / Bili: Negative / Urobili: Negative   Blood: x / Protein: 30 mg/dL / Nitrite: Negative   Leuk Esterase: Negative / RBC: 0-2 /HPF / WBC 0-2 /HPF   Sq Epi: x / Non Sq Epi: Occasional / Bacteria: x      CAPILLARY BLOOD GLUCOSE    ABG - ( 12 May 2017 14:19 )  pH: 7.40  /  pCO2: 37    /  pO2: 79    / HCO3: 22    / Base Excess: -1.8  /  SaO2: 96                RADIOLOGY:    17 CXR: Improvement in mild diffuse interstitial prominence. Mild pulmonary  vascular congestion persists    CXR:  2017  IMPRESSION: Mild diffuse interstitial prominence and scattered airspace opacities, most prominent in the right to mid to lower lung. Findings may be due to  edema or infection.    2017    CT HEAD WITHOUT CONTRAST:  No acute intracranial hemorrhage, mass effect or midline shift. MRI may  be performed for further evaluation as clinically indicated.        MICROBIOLOGY DATA:    Urine Microscopic-Add On (NC) (17 @ 15:36)    Epithelial Cells: Occasional    White Blood Cell - Urine: 0-2 /HPF    Red Blood Cell - Urine: 0-2 /HPF    Culture - Blood (17 @ 01:46)    Specimen Source: .Blood Blood-Venous    Culture Results:  No growth to date.    Culture - Blood (17 @ 01:38)    Specimen Source: .Blood Blood-Peripheral    Culture Results: No growth to date.        A 89 yo F from home lives alone with HHA 7 days and 24 hrs, ambulated with a walker,  R hip + femur and wrist fx (s/p repair in ), CLL and Alzheimer's dementia brought in to the ER for evaluation of a fall, landed on left hip. She found to have Left hip fracture underwent Im nailing .The post-op course complicated with RRT due to unresponsiveness and found to have fever, T102, multiple right upper and lower lobe opacities, most likely Aspiration pneumonia. She has started on zosyn and ID consult requested to assist with further evaluation and antibiotic management.          REVIEW OF SYSTEMS: Total of twelve systems have been reviewed and found to be negative unless mentioned in HPI        PAST MEDICAL & SURGICAL HISTORY:  UTI (urinary tract infection)  Dementia  Hypertension  Depression  HTN (hypertension)  CLL (chronic lymphocytic leukemia)  Femur fracture, right      SOCIAL HISTORY  Alcohol: Does not drink  Tobacco: Does not smoke  No Illicit substance use        FAMILY HISTORY: Non contributory to the present illness          MEDICATIONS  (STANDING):  enoxaparin Injectable 30milliGRAM(s) SubCutaneous every 12 hours  docusate sodium 100milliGRAM(s) Oral three times a day  ferrous    sulfate 325milliGRAM(s) Oral three times a day with meals  folic acid 1milliGRAM(s) Oral daily  multivitamin 1Tablet(s) Oral daily  ascorbic acid 500milliGRAM(s) Oral two times a day  metoprolol succinate ER 25milliGRAM(s) Oral daily  mirtazapine 15milliGRAM(s) Oral at bedtime  dextrose 5% + sodium chloride 0.9%. 1000milliLiter(s) IV Continuous <Continuous>  piperacillin/tazobactam IVPB. 3.375Gram(s) IV Intermittent every 8 hours        Vital Signs Last 24 Hrs  T(C): 36.8, Max: 38.8 (05-12 @ 20:06)  T(F): 98.2, Max: 101.8 (05-12 @ 20:06)  HR: 86 (78 - 101)  BP: 120/45 (94/32 - 160/57)  BP(mean): --  RR: 17 (15 - 20)  SpO2: 96% (95% - 99%)      PHYSICAL EXAM:    GENERAL: No tin distress  CVS: s1 and s2 present  RESP: Air entry B/L  GI: abdomen soft and nontender  EXT: Left hip incision site has bandage in placed  CNS: Awake and alert        LABS:                        9.2    30.2  )-----------( 91       ( 13 May 2017 08:05 )             28.6     05-13    144  |  113<H>  |  24<H>  ----------------------------<  112<H>  4.3   |  22  |  1.52<H>    Ca    8.1<L>      13 May 2017 08:05  Phos  3.1     05-12  Mg     1.8     05-12    TPro  5.1<L>  /  Alb  2.7<L>  /  TBili  0.3  /  DBili  x   /  AST  31  /  ALT  14  /  AlkPhos  61  05-12      Urinalysis Basic - ( 12 May 2017 15:36 )    Color: Yellow / Appearance: Clear / S.015 / pH: x  Gluc: x / Ketone: Negative  / Bili: Negative / Urobili: Negative   Blood: x / Protein: 30 mg/dL / Nitrite: Negative   Leuk Esterase: Negative / RBC: 0-2 /HPF / WBC 0-2 /HPF   Sq Epi: x / Non Sq Epi: Occasional / Bacteria: x      CAPILLARY BLOOD GLUCOSE    ABG - ( 12 May 2017 14:19 )  pH: 7.40  /  pCO2: 37    /  pO2: 79    / HCO3: 22    / Base Excess: -1.8  /  SaO2: 96          RADIOLOGY:      CXR:  2017  IMPRESSION: Mild diffuse interstitial prominence and scattered airspace opacities, most prominent in the right to mid to lower lung. Findings may be due to  edema or infection.       2017    CT HEAD WITHOUT CONTRAST:  No acute intracranial hemorrhage, mass effect or midline shift. MRI may  be performed for further evaluation as clinically indicated.      MICROBIOLOGY DATA:    Urine Microscopic-Add On (NC) (17 @ 15:36)    Epithelial Cells: Occasional    White Blood Cell - Urine: 0-2 /HPF    Red Blood Cell - Urine: 0-2 /HPF    17 Blood culture: Pending

## 2017-05-18 ENCOUNTER — TRANSCRIPTION ENCOUNTER (OUTPATIENT)
Age: 82
End: 2017-05-18

## 2017-05-18 VITALS — HEART RATE: 81 BPM | SYSTOLIC BLOOD PRESSURE: 125 MMHG | OXYGEN SATURATION: 98 % | DIASTOLIC BLOOD PRESSURE: 69 MMHG

## 2017-05-18 LAB
ANION GAP SERPL CALC-SCNC: 15 MMOL/L — SIGNIFICANT CHANGE UP (ref 5–17)
BUN SERPL-MCNC: 25 MG/DL — HIGH (ref 7–18)
CALCIUM SERPL-MCNC: 7.5 MG/DL — LOW (ref 8.4–10.5)
CHLORIDE SERPL-SCNC: 112 MMOL/L — HIGH (ref 96–108)
CO2 SERPL-SCNC: 20 MMOL/L — LOW (ref 22–31)
CREAT SERPL-MCNC: 1.58 MG/DL — HIGH (ref 0.5–1.3)
CULTURE RESULTS: SIGNIFICANT CHANGE UP
CULTURE RESULTS: SIGNIFICANT CHANGE UP
EOSINOPHIL NFR BLD AUTO: 1 % — SIGNIFICANT CHANGE UP (ref 0–6)
GLUCOSE SERPL-MCNC: 101 MG/DL — HIGH (ref 70–99)
HCT VFR BLD CALC: 29.9 % — LOW (ref 34.5–45)
HGB BLD-MCNC: 10.1 G/DL — LOW (ref 11.5–15.5)
LYMPHOCYTES # BLD AUTO: 83 % — HIGH (ref 13–44)
MCHC RBC-ENTMCNC: 32.4 PG — SIGNIFICANT CHANGE UP (ref 27–34)
MCHC RBC-ENTMCNC: 33.7 GM/DL — SIGNIFICANT CHANGE UP (ref 32–36)
MCV RBC AUTO: 96.1 FL — SIGNIFICANT CHANGE UP (ref 80–100)
NEUTROPHILS NFR BLD AUTO: 16 % — LOW (ref 43–77)
PLATELET # BLD AUTO: 244 K/UL — SIGNIFICANT CHANGE UP (ref 150–400)
POTASSIUM SERPL-MCNC: 4.3 MMOL/L — SIGNIFICANT CHANGE UP (ref 3.5–5.3)
POTASSIUM SERPL-SCNC: 4.3 MMOL/L — SIGNIFICANT CHANGE UP (ref 3.5–5.3)
RBC # BLD: 3.12 M/UL — LOW (ref 3.8–5.2)
RBC # FLD: 13.3 % — SIGNIFICANT CHANGE UP (ref 10.3–14.5)
SODIUM SERPL-SCNC: 147 MMOL/L — HIGH (ref 135–145)
SPECIMEN SOURCE: SIGNIFICANT CHANGE UP
SPECIMEN SOURCE: SIGNIFICANT CHANGE UP
WBC # BLD: SIGNIFICANT CHANGE UP (ref 3.8–10.5)
WBC # FLD AUTO: SIGNIFICANT CHANGE UP (ref 3.8–10.5)

## 2017-05-18 PROCEDURE — 94640 AIRWAY INHALATION TREATMENT: CPT

## 2017-05-18 PROCEDURE — 76000 FLUOROSCOPY <1 HR PHYS/QHP: CPT

## 2017-05-18 PROCEDURE — 70450 CT HEAD/BRAIN W/O DYE: CPT

## 2017-05-18 PROCEDURE — 85027 COMPLETE CBC AUTOMATED: CPT

## 2017-05-18 PROCEDURE — C1769: CPT

## 2017-05-18 PROCEDURE — 82607 VITAMIN B-12: CPT

## 2017-05-18 PROCEDURE — 81001 URINALYSIS AUTO W/SCOPE: CPT

## 2017-05-18 PROCEDURE — P9040: CPT

## 2017-05-18 PROCEDURE — 86901 BLOOD TYPING SEROLOGIC RH(D): CPT

## 2017-05-18 PROCEDURE — 92610 EVALUATE SWALLOWING FUNCTION: CPT

## 2017-05-18 PROCEDURE — 84443 ASSAY THYROID STIM HORMONE: CPT

## 2017-05-18 PROCEDURE — 80048 BASIC METABOLIC PNL TOTAL CA: CPT

## 2017-05-18 PROCEDURE — 97116 GAIT TRAINING THERAPY: CPT

## 2017-05-18 PROCEDURE — 82803 BLOOD GASES ANY COMBINATION: CPT

## 2017-05-18 PROCEDURE — 84100 ASSAY OF PHOSPHORUS: CPT

## 2017-05-18 PROCEDURE — 36430 TRANSFUSION BLD/BLD COMPNT: CPT

## 2017-05-18 PROCEDURE — 82746 ASSAY OF FOLIC ACID SERUM: CPT

## 2017-05-18 PROCEDURE — 97530 THERAPEUTIC ACTIVITIES: CPT

## 2017-05-18 PROCEDURE — 83605 ASSAY OF LACTIC ACID: CPT

## 2017-05-18 PROCEDURE — 97162 PT EVAL MOD COMPLEX 30 MIN: CPT

## 2017-05-18 PROCEDURE — 97110 THERAPEUTIC EXERCISES: CPT

## 2017-05-18 PROCEDURE — 73552 X-RAY EXAM OF FEMUR 2/>: CPT

## 2017-05-18 PROCEDURE — 86900 BLOOD TYPING SEROLOGIC ABO: CPT

## 2017-05-18 PROCEDURE — 80053 COMPREHEN METABOLIC PANEL: CPT

## 2017-05-18 PROCEDURE — 82553 CREATINE MB FRACTION: CPT

## 2017-05-18 PROCEDURE — 85610 PROTHROMBIN TIME: CPT

## 2017-05-18 PROCEDURE — 71045 X-RAY EXAM CHEST 1 VIEW: CPT

## 2017-05-18 PROCEDURE — 73502 X-RAY EXAM HIP UNI 2-3 VIEWS: CPT

## 2017-05-18 PROCEDURE — 86920 COMPATIBILITY TEST SPIN: CPT

## 2017-05-18 PROCEDURE — C1713: CPT

## 2017-05-18 PROCEDURE — 93005 ELECTROCARDIOGRAM TRACING: CPT

## 2017-05-18 PROCEDURE — 82550 ASSAY OF CK (CPK): CPT

## 2017-05-18 PROCEDURE — 83036 HEMOGLOBIN GLYCOSYLATED A1C: CPT

## 2017-05-18 PROCEDURE — 87040 BLOOD CULTURE FOR BACTERIA: CPT

## 2017-05-18 PROCEDURE — 86850 RBC ANTIBODY SCREEN: CPT

## 2017-05-18 PROCEDURE — 85730 THROMBOPLASTIN TIME PARTIAL: CPT

## 2017-05-18 PROCEDURE — 99285 EMERGENCY DEPT VISIT HI MDM: CPT | Mod: 25

## 2017-05-18 PROCEDURE — 93306 TTE W/DOPPLER COMPLETE: CPT

## 2017-05-18 PROCEDURE — 82306 VITAMIN D 25 HYDROXY: CPT

## 2017-05-18 PROCEDURE — 80061 LIPID PANEL: CPT

## 2017-05-18 PROCEDURE — 84484 ASSAY OF TROPONIN QUANT: CPT

## 2017-05-18 PROCEDURE — 83735 ASSAY OF MAGNESIUM: CPT

## 2017-05-18 RX ORDER — IPRATROPIUM/ALBUTEROL SULFATE 18-103MCG
3 AEROSOL WITH ADAPTER (GRAM) INHALATION ONCE
Qty: 0 | Refills: 0 | Status: DISCONTINUED | OUTPATIENT
Start: 2017-05-18 | End: 2017-05-18

## 2017-05-18 RX ORDER — METOPROLOL TARTRATE 50 MG
1 TABLET ORAL
Qty: 0 | Refills: 0 | COMMUNITY

## 2017-05-18 RX ORDER — FOLIC ACID 0.8 MG
1 TABLET ORAL
Qty: 0 | Refills: 0 | COMMUNITY
Start: 2017-05-18

## 2017-05-18 RX ORDER — ENOXAPARIN SODIUM 100 MG/ML
30 INJECTION SUBCUTANEOUS
Qty: 0 | Refills: 0 | COMMUNITY
Start: 2017-05-18

## 2017-05-18 RX ORDER — ASCORBIC ACID 60 MG
1 TABLET,CHEWABLE ORAL
Qty: 0 | Refills: 0 | COMMUNITY
Start: 2017-05-18

## 2017-05-18 RX ORDER — METOPROLOL TARTRATE 50 MG
1 TABLET ORAL
Qty: 0 | Refills: 0 | COMMUNITY
Start: 2017-05-18

## 2017-05-18 RX ORDER — DOCUSATE SODIUM 100 MG
1 CAPSULE ORAL
Qty: 0 | Refills: 0 | COMMUNITY
Start: 2017-05-18

## 2017-05-18 RX ORDER — OXYCODONE HYDROCHLORIDE 5 MG/1
1 TABLET ORAL
Qty: 0 | Refills: 0 | COMMUNITY
Start: 2017-05-18

## 2017-05-18 RX ORDER — OXYCODONE HYDROCHLORIDE 5 MG/1
2 TABLET ORAL
Qty: 0 | Refills: 0 | COMMUNITY
Start: 2017-05-18

## 2017-05-18 RX ORDER — IPRATROPIUM/ALBUTEROL SULFATE 18-103MCG
3 AEROSOL WITH ADAPTER (GRAM) INHALATION
Qty: 0 | Refills: 0 | COMMUNITY
Start: 2017-05-18

## 2017-05-18 RX ADMIN — Medication 325 MILLIGRAM(S): at 08:57

## 2017-05-18 RX ADMIN — ALBUTEROL 2.5 MILLIGRAM(S): 90 AEROSOL, METERED ORAL at 02:18

## 2017-05-18 RX ADMIN — Medication 25 MILLIGRAM(S): at 05:43

## 2017-05-18 RX ADMIN — ENOXAPARIN SODIUM 30 MILLIGRAM(S): 100 INJECTION SUBCUTANEOUS at 05:43

## 2017-05-18 RX ADMIN — PIPERACILLIN AND TAZOBACTAM 25 GRAM(S): 4; .5 INJECTION, POWDER, LYOPHILIZED, FOR SOLUTION INTRAVENOUS at 13:29

## 2017-05-18 RX ADMIN — Medication 500 MILLIGRAM(S): at 05:43

## 2017-05-18 RX ADMIN — Medication 1 TABLET(S): at 12:18

## 2017-05-18 RX ADMIN — Medication 100 MILLIGRAM(S): at 05:43

## 2017-05-18 RX ADMIN — Medication 1 TABLET(S): at 08:00

## 2017-05-18 RX ADMIN — Medication 1 TABLET(S): at 12:19

## 2017-05-18 RX ADMIN — PIPERACILLIN AND TAZOBACTAM 25 GRAM(S): 4; .5 INJECTION, POWDER, LYOPHILIZED, FOR SOLUTION INTRAVENOUS at 05:43

## 2017-05-18 RX ADMIN — Medication 325 MILLIGRAM(S): at 12:18

## 2017-05-18 RX ADMIN — Medication 1 MILLIGRAM(S): at 12:19

## 2017-05-18 NOTE — DISCHARGE NOTE ADULT - PLAN OF CARE
Patient is orthopaedically stable. Continue with physical therapy of left lower extremity with weight bearing as tolerated. Dry dressing changes to left hip wound every 2 days. c/w lovenox q12h till 04/08/17.Staples to be removed in rehab facility on 5/26/17. Patient to follow up with Dr. Parsons at [phone] upon d/c from rehab facility. s/p IM nailing treat infection c/w augmentin for more days  c/w duoneb c/w remeron c/w metoprolol c/w augmentin for 4 more days  c/w duoneb

## 2017-05-18 NOTE — DISCHARGE NOTE ADULT - HOSPITAL COURSE
89 yo F from home lives alone with HHA 7 days and 24 hrs, ambulated with a walker and AO x 2 (person and place). PMH CLL, HTN, depression, R hip + femur and wrist fx (s/p repair in 2012), PNA (2016 admitted at [facility]), macular degeneration and Alzheimer's dementia. admitted s/p mechanical fall found to have left hip fx underwent IM nailing.    # Fracture of hip, unspecified laterality, closed, with delayed healing, subsequent encounter.  Plan: POD # 8 s/p Lt hip IM nailing for left intertrochanteric fx  Patient is orthopaedically stable. c/w lovenox bid total 30 days  Continue with physical therapy of left lower extremity with weight bearing as tolerated. Dry dressing changes to left hip wound every 2 days.   Staples to be removed in rehab facility on 5/26/17.   Patient to follow up with Dr. Parsons at [phone] upon d/c from rehab facility.  OOB to chair  PT HEIDE    #Aspiration pneumonia of right upper lobe due to gastric secretions.  Plan: -patient had RRT on 5/12 due to brief episode of unresponsiveness, likely due to aspiration  -on zosyn iv, will c/w augmentin on dc to complete totl 10 day course  -patient had been NPO and received IVF, after which she developed mild pulmonary edema and required iv diureses with good response.-Pulm consult Dr Wiley  -Discussed with Dr Ventura ID  -c/w mechanical soft diet per swallow eval.     #Essential hypertension.  Plan: c/w metoprolol 25 mg od.     # Alzheimer's disease of other onset.  Plan: c/w remeron.     #Thrombocytopenia. Plan: Improving, likely due to sepsis/post op state  Hem onc Dr Shen.    s/p hypokalemia  #Hypernatremia-recommended thickened free water 250 q6h    Patient medically ready for discharge today per attending

## 2017-05-18 NOTE — PROGRESS NOTE ADULT - PROVIDER SPECIALTY LIST ADULT
Heme/Onc
Infectious Disease
Internal Medicine
Orthopedics
Orthopedics
Pulmonology
Internal Medicine

## 2017-05-18 NOTE — PROGRESS NOTE ADULT - ASSESSMENT
Astrh Bronchitis r/o cardiac asthma  Pneumonia Rt CAPresolved   Htn with MR r/o CHF  Depression /dementia  Fall  Anemia   LLL  Planduoneb 1 dose qid  no lopressor  Mg and po4 supplement  P/T

## 2017-05-18 NOTE — DISCHARGE NOTE ADULT - MEDICATION SUMMARY - MEDICATIONS TO TAKE
I will START or STAY ON the medications listed below when I get home from the hospital:    acetaminophen-oxycodone 325 mg-5 mg oral tablet  -- 1 tab(s) by mouth every 4 hours, As needed, Moderate Pain (4 - 6)  -- Indication: For Pain    acetaminophen-oxycodone 325 mg-5 mg oral tablet  -- 2 tab(s) by mouth every 6 hours, As needed, Severe Pain (7 - 10)  -- Indication: For Pain    enoxaparin  -- 30 milligram(s) subcutaneous every 12 hours till 04/08/17  -- Indication: For Hip fracture    mirtazapine 15 mg oral tablet  -- 1.5 tab(s) by mouth once a day (at bedtime)  -- Indication: For dementia    metoprolol succinate 25 mg oral tablet, extended release  -- 1 tab(s) by mouth once a day  -- Indication: For Htn    albuterol-ipratropium 2.5 mg-0.5 mg/3 mL inhalation solution  -- 3 milliliter(s) inhaled once, As needed, Shortness of Breath and/or Wheezing  -- Indication: For Aspiration pnuemonia    ferrous sulfate 325 mg (65 mg elemental iron) oral delayed release tablet  -- 1 tab(s) by mouth 3 times a day  -- Indication: For supplements    docusate sodium 100 mg oral capsule  -- 1 cap(s) by mouth 2 times a day  -- Indication: For Prophylaxis    senna oral tablet  -- 2 tab(s) by mouth once a day  -- Indication: For Prophylaxis    Augmentin 875 mg-125 mg oral tablet  -- 1 tab(s) by mouth every 12 hours for 4 more days  -- Indication: For Aspiration pneumonia of right upper lobe due to gastric secretions    Multiple Vitamins oral tablet  -- 1 tab(s) by mouth once a day  -- Indication: For supplements    ascorbic acid 500 mg oral tablet  -- 1 tab(s) by mouth 2 times a day  -- Indication: For supplements    folic acid 1 mg oral tablet  -- 1 tab(s) by mouth once a day  -- Indication: For supplements

## 2017-05-18 NOTE — DISCHARGE NOTE ADULT - SECONDARY DIAGNOSIS.
Aspiration pneumonia of right upper lobe due to gastric secretions Alzheimer's disease of other onset Essential hypertension

## 2017-05-18 NOTE — DISCHARGE NOTE ADULT - PATIENT PORTAL LINK FT
“You can access the Follow[employer] Patient Portal, offered by [facility], by registering with the following website: [email or web address]”

## 2017-05-18 NOTE — DISCHARGE NOTE ADULT - CARE PLAN
Principal Discharge DX:	Closed fracture of left hip, initial encounter  Goal:	s/p IM nailing  Instructions for follow-up, activity and diet:	Patient is orthopaedically stable. Continue with physical therapy of left lower extremity with weight bearing as tolerated. Dry dressing changes to left hip wound every 2 days. c/w lovenox q12h till 04/08/17.Staples to be removed in rehab facility on 5/26/17. Patient to follow up with Dr. Parsons at [phone] upon d/c from rehab facility.  Secondary Diagnosis:	Aspiration pneumonia of right upper lobe due to gastric secretions  Goal:	treat infection  Instructions for follow-up, activity and diet:	c/w augmentin for more days  c/w duoneb  Secondary Diagnosis:	Alzheimer's disease of other onset  Instructions for follow-up, activity and diet:	c/w remeron  Secondary Diagnosis:	Essential hypertension  Instructions for follow-up, activity and diet:	c/w metoprolol Principal Discharge DX:	Closed fracture of left hip, initial encounter  Goal:	s/p IM nailing  Instructions for follow-up, activity and diet:	Patient is orthopaedically stable. Continue with physical therapy of left lower extremity with weight bearing as tolerated. Dry dressing changes to left hip wound every 2 days. c/w lovenox q12h till 04/08/17.Staples to be removed in rehab facility on 5/26/17. Patient to follow up with Dr. Parsons at [phone] upon d/c from rehab facility.  Secondary Diagnosis:	Aspiration pneumonia of right upper lobe due to gastric secretions  Goal:	treat infection  Instructions for follow-up, activity and diet:	c/w augmentin for 4 more days  c/w duoneb  Secondary Diagnosis:	Alzheimer's disease of other onset  Instructions for follow-up, activity and diet:	c/w remeron  Secondary Diagnosis:	Essential hypertension  Instructions for follow-up, activity and diet:	c/w metoprolol Principal Discharge DX:	Closed fracture of left hip, initial encounter  Goal:	s/p IM nailing  Instructions for follow-up, activity and diet:	Patient is orthopaedically stable. Continue with physical therapy of left lower extremity with weight bearing as tolerated. Dry dressing changes to left hip wound every 2 days. c/w lovenox q12h till 04/08/17.Staples to be removed in rehab facility on 5/26/17. Patient to follow up with Dr. Parsons at [phone] upon d/c from rehab facility.  Secondary Diagnosis:	Aspiration pneumonia of right upper lobe due to gastric secretions  Goal:	treat infection  Instructions for follow-up, activity and diet:	c/w augmentin for 4 more days  c/w duoneb  Secondary Diagnosis:	Alzheimer's disease of other onset  Instructions for follow-up, activity and diet:	c/w remeron  Secondary Diagnosis:	Essential hypertension  Instructions for follow-up, activity and diet:	c/w metoprolol Principal Discharge DX:	Closed fracture of left hip, initial encounter  Goal:	s/p IM nailing  Instructions for follow-up, activity and diet:	Patient is orthopaedically stable. Continue with physical therapy of left lower extremity with weight bearing as tolerated. Dry dressing changes to left hip wound every 2 days. c/w lovenox q12h till 04/08/17.Staples to be removed in rehab facility on 5/26/17. Patient to follow up with Dr. Parsons at [phone] upon d/c from rehab facility.  Secondary Diagnosis:	Aspiration pneumonia of right upper lobe due to gastric secretions  Goal:	treat infection  Instructions for follow-up, activity and diet:	c/w augmentin for 4 more days  c/w duoneb  Secondary Diagnosis:	Alzheimer's disease of other onset  Instructions for follow-up, activity and diet:	c/w remeron  Secondary Diagnosis:	Essential hypertension  Instructions for follow-up, activity and diet:	c/w metoprolol

## 2017-05-18 NOTE — PROGRESS NOTE ADULT - ATTENDING COMMENTS
I have examined pt personally Hx chart lab and xrays reviewed and pt discussed with residents
Patient seen and examined, d/w resident and agree with above.
Patient seen and examined, agree with above

## 2017-05-18 NOTE — DISCHARGE NOTE ADULT - MEDICATION SUMMARY - MEDICATIONS TO STOP TAKING
I will STOP taking the medications listed below when I get home from the hospital:    Cipro  --  by mouth    Ceftin 250 mg oral tablet  -- 1 tab(s) by mouth 2 times a day  -- Finish all this medication unless otherwise directed by prescriber.  Medication should be taken with plenty of water.  Take with food or milk.

## 2017-05-18 NOTE — DISCHARGE NOTE ADULT - CARE PROVIDER_API CALL
Win Schmitt), Internal Medicine  [address] [place] [address]  Phone: [phone]  Fax: [phone]

## 2017-05-18 NOTE — PROGRESS NOTE ADULT - SUBJECTIVE AND OBJECTIVE BOX
PULMONARY  progress note    GRETCHEN TOURE  MRN-[id number]    Patient is a 89y old  Female who presents with a chief complaint of falls  no cough or sob    HISTORY OF PRESENT ILLNESS:    MEDICATIONS  (STANDING):  enoxaparin Injectable 30milliGRAM(s) SubCutaneous every 12 hours  ferrous    sulfate 325milliGRAM(s) Oral three times a day with meals  folic acid 1milliGRAM(s) Oral daily  multivitamin 1Tablet(s) Oral daily  ascorbic acid 500milliGRAM(s) Oral two times a day  mirtazapine 15milliGRAM(s) Oral at bedtime  piperacillin/tazobactam IVPB. 3.375Gram(s) IV Intermittent every 8 hours  ALBUTerol    0.083% 2.5milliGRAM(s) Nebulizer every 6 hours  metoprolol succinate ER 25milliGRAM(s) Oral daily  docusate sodium 100milliGRAM(s) Oral two times a day  senna 2Tablet(s) Oral at bedtime  potassium acid phosphate/sodium acid phosphate tablet (K-PHOS No. 2) 1Tablet(s) Oral four times a day with meals      MEDICATIONS  (PRN):  acetaminophen   Tablet 650milliGRAM(s) Oral every 6 hours PRN For Temp over 38.3 C (100.94 F)  aluminum hydroxide/magnesium hydroxide/simethicone Suspension 30milliLiter(s) Oral four times a day PRN Indigestion  ondansetron Injectable 4milliGRAM(s) IV Push every 6 hours PRN Nausea and/or Vomiting  oxyCODONE  5 mG/acetaminophen 325 mG 1Tablet(s) Oral every 4 hours PRN Moderate Pain (4 - 6)  oxyCODONE  5 mG/acetaminophen 325 mG 2Tablet(s) Oral every 6 hours PRN Severe Pain (7 - 10)  acetaminophen  Suppository 650milliGRAM(s) Rectal every 4 hours PRN For Temp greater than 38 C (100.4 F)  melatonin 3milliGRAM(s) Oral at bedtime PRN Insomnia      Allergies    No Known Allergies    Intolerances        PAST MEDICAL & SURGICAL HISTORY:  UTI (urinary tract infection)  Dementia  Hypertension  Depression  HTN (hypertension)  CLL (chronic lymphocytic leukemia)  Femur fracture, right           REVIEW OF SYSTEMS:  CONSTITUTIONAL: No fever, weight loss, or fatigue   EYES: No eye pain, visual disturbances, or discharge  ENMT:  No difficulty hearing, tinnitus, vertigo; No sinus or throat pain  NECK: No pain or stiffness   RESPIRATORY:  cough  --  , wheezing --    chills --    hemoptysis  --  Shortness of Breath--  CARDIOVASCULAR: No chest pain, palpitations, passing out, dizziness, or leg swelling  GASTROINTESTINAL: No abdominal or epigastric pain. No nausea, vomiting, or hematemesis; No diarrhea or constipation. No melena or hematochezia.  GENITOURINARY: No dysuria, frequency, hematuria, or incontinence  NEUROLOGICAL: No headaches, memory loss, loss of strength, numbness, or tremors  SKIN: No itching, burning, rashes, or lesions   LYMPH Nodes: No enlarged glands  ENDOCRINE: No heat or cold intolerance; No hair loss  MUSCULOSKELETAL: No joint pain or swelling; No muscle, back, or extremity pain  PSYCHIATRIC: No depression, anxiety, mood swings, or difficulty sleeping  HEME/LYMPH: No easy bruising, or bleeding gums  ALLERY AND IMMUNOLOGIC: No hives or eczema    Vital Signs Last 24 Hrs  T(C): 36.7, Max: 36.7 (05-17 @ 23:44)  T(F): 98, Max: 98.1 (05-17 @ 23:44)  HR: 92 (86 - 94)  BP: 124/61 (115/65 - 152/70)  BP(mean): --  RR: 18 (18 - 18)  SpO2: 95% (95% - 98%)  I&O's Detail      PHYSICAL EXAMINATION:    GENERAL: The patient is a well-developed, well-nourished in no apparent distress.     HEENT: Head is normocephalic and atraumatic. Extraocular muscles are intact. Mucous membranes are moist.   Neck supple ,No LN felt JVP not increased  Cardiovascular:AF No JVD  systolic  murmur at apex, No gallop  Respiratory: Lungs ves breathing with  rales,     wheeze	  Psychiatry:, Mood & affect appropriate  Gastrointestinal:  Soft, Non-tender,   no hepatomegaly or splenomegaly BS pos  Skin: No rashes, No ecchymoses, No cyanosis	  Extremities: Normal range of motion, No clubbing, cyanosis or edema  Vascular: Peripheral pulses palpable 2+ bilaterally  NEUROLOGIC: Cranial nerves intact  sensoryok  motor dtr 2+  DTR  babinski neg    LABS:                        10.1   x     )-----------( 244      ( 18 May 2017 07:51 )             29.9     05-18    147<H>  |  112<H>  |  25<H>  ----------------------------<  101<H>  4.3   |  20<L>  |  1.58<H>    Ca    7.5<L>      18 May 2017 07:51  Phos  2.0     05-17  Mg     2.0     05-17                          MICROBIOLOGY:    RADIOLOGY & ADDITIONAL STUDIES:    CXR:

## 2017-05-22 DIAGNOSIS — E87.0 HYPEROSMOLALITY AND HYPERNATREMIA: ICD-10-CM

## 2017-05-22 DIAGNOSIS — C91.10 CHRONIC LYMPHOCYTIC LEUKEMIA OF B-CELL TYPE NOT HAVING ACHIEVED REMISSION: ICD-10-CM

## 2017-05-22 DIAGNOSIS — S72.142A DISPLACED INTERTROCHANTERIC FRACTURE OF LEFT FEMUR, INITIAL ENCOUNTER FOR CLOSED FRACTURE: ICD-10-CM

## 2017-05-22 DIAGNOSIS — T50.1X5A ADVERSE EFFECT OF LOOP [HIGH-CEILING] DIURETICS, INITIAL ENCOUNTER: ICD-10-CM

## 2017-05-22 DIAGNOSIS — F32.9 MAJOR DEPRESSIVE DISORDER, SINGLE EPISODE, UNSPECIFIED: ICD-10-CM
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
## 2017-05-22 DIAGNOSIS — G92 TOXIC ENCEPHALOPATHY: ICD-10-CM

## 2017-05-22 DIAGNOSIS — G30.1 ALZHEIMER'S DISEASE WITH LATE ONSET: ICD-10-CM
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
## 2017-05-22 DIAGNOSIS — I10 ESSENTIAL (PRIMARY) HYPERTENSION: ICD-10-CM

## 2017-05-22 DIAGNOSIS — R13.10 DYSPHAGIA, UNSPECIFIED: ICD-10-CM

## 2017-05-22 DIAGNOSIS — D64.9 ANEMIA, UNSPECIFIED: ICD-10-CM

## 2017-05-22 DIAGNOSIS — E87.6 HYPOKALEMIA: ICD-10-CM

## 2017-05-22 DIAGNOSIS — F02.81 DEMENTIA IN OTHER DISEASES CLASSIFIED ELSEWHERE, UNSPECIFIED SEVERITY, WITH BEHAVIORAL DISTURBANCE: ICD-10-CM

## 2017-05-22 DIAGNOSIS — J69.0 PNEUMONITIS DUE TO INHALATION OF FOOD AND VOMIT: ICD-10-CM

## 2017-05-22 DIAGNOSIS — T81.4XXA INFECTION FOLLOWING A PROCEDURE, INITIAL ENCOUNTER: ICD-10-CM

## 2017-05-22 DIAGNOSIS — J45.909 UNSPECIFIED ASTHMA, UNCOMPLICATED: ICD-10-CM
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
## 2017-06-02 DIAGNOSIS — A41.9 SEPSIS, UNSPECIFIED ORGANISM: ICD-10-CM

## 2017-06-02 DIAGNOSIS — D69.59 OTHER SECONDARY THROMBOCYTOPENIA: ICD-10-CM

## 2018-01-31 ENCOUNTER — EMERGENCY (EMERGENCY)
Facility: HOSPITAL | Age: 83
LOS: 1 days | Discharge: HOME | End: 2018-01-31
Attending: EMERGENCY MEDICINE
Payer: MEDICARE

## 2018-01-31 VITALS
HEIGHT: 60 IN | OXYGEN SATURATION: 96 % | WEIGHT: 110.01 LBS | RESPIRATION RATE: 16 BRPM | HEART RATE: 80 BPM | SYSTOLIC BLOOD PRESSURE: 115 MMHG | DIASTOLIC BLOOD PRESSURE: 75 MMHG

## 2018-01-31 LAB
ALBUMIN SERPL ELPH-MCNC: 3.2 G/DL — LOW (ref 3.5–5)
ALP SERPL-CCNC: 159 U/L — HIGH (ref 40–120)
ALT FLD-CCNC: 174 U/L DA — HIGH (ref 10–60)
ANION GAP SERPL CALC-SCNC: 7 MMOL/L — SIGNIFICANT CHANGE UP (ref 5–17)
APPEARANCE UR: CLEAR — SIGNIFICANT CHANGE UP
APTT BLD: 36.5 SEC — SIGNIFICANT CHANGE UP (ref 27.5–37.4)
AST SERPL-CCNC: 192 U/L — HIGH (ref 10–40)
BASE EXCESS BLDV CALC-SCNC: -0.2 MMOL/L — SIGNIFICANT CHANGE UP (ref -2–2)
BILIRUB SERPL-MCNC: 0.9 MG/DL — SIGNIFICANT CHANGE UP (ref 0.2–1.2)
BILIRUB UR-MCNC: ABNORMAL
BLOOD GAS COMMENTS, VENOUS: SIGNIFICANT CHANGE UP
BUN SERPL-MCNC: 34 MG/DL — HIGH (ref 7–18)
CALCIUM SERPL-MCNC: 8.6 MG/DL — SIGNIFICANT CHANGE UP (ref 8.4–10.5)
CHLORIDE SERPL-SCNC: 112 MMOL/L — HIGH (ref 96–108)
CO2 SERPL-SCNC: 27 MMOL/L — SIGNIFICANT CHANGE UP (ref 22–31)
COLOR SPEC: YELLOW — SIGNIFICANT CHANGE UP
CREAT SERPL-MCNC: 1.41 MG/DL — HIGH (ref 0.5–1.3)
DIFF PNL FLD: NEGATIVE — SIGNIFICANT CHANGE UP
EOSINOPHIL NFR BLD AUTO: 4 % — SIGNIFICANT CHANGE UP (ref 0–6)
GLUCOSE SERPL-MCNC: 122 MG/DL — HIGH (ref 70–99)
GLUCOSE UR QL: NEGATIVE — SIGNIFICANT CHANGE UP
HCO3 BLDV-SCNC: 27 MMOL/L — SIGNIFICANT CHANGE UP (ref 21–29)
HCT VFR BLD CALC: 41.8 % — SIGNIFICANT CHANGE UP (ref 34.5–45)
HGB BLD-MCNC: 12.7 G/DL — SIGNIFICANT CHANGE UP (ref 11.5–15.5)
HOROWITZ INDEX BLDV+IHG-RTO: 21 — SIGNIFICANT CHANGE UP
INR BLD: 1.15 RATIO — SIGNIFICANT CHANGE UP (ref 0.88–1.16)
KETONES UR-MCNC: ABNORMAL
LEUKOCYTE ESTERASE UR-ACNC: ABNORMAL
LYMPHOCYTES # BLD AUTO: 7 % — LOW (ref 13–44)
MCHC RBC-ENTMCNC: 30.4 GM/DL — LOW (ref 32–36)
MCHC RBC-ENTMCNC: 31.8 PG — SIGNIFICANT CHANGE UP (ref 27–34)
MCV RBC AUTO: 104.5 FL — HIGH (ref 80–100)
MONOCYTES NFR BLD AUTO: 6 % — SIGNIFICANT CHANGE UP (ref 2–14)
NEUTROPHILS NFR BLD AUTO: 24 % — LOW (ref 43–77)
NITRITE UR-MCNC: NEGATIVE — SIGNIFICANT CHANGE UP
PCO2 BLDV: 57 MMHG — HIGH (ref 35–50)
PH BLDV: 7.29 — LOW (ref 7.35–7.45)
PH UR: 5 — SIGNIFICANT CHANGE UP (ref 5–8)
PLATELET # BLD AUTO: 136 K/UL — LOW (ref 150–400)
PO2 BLDV: SIGNIFICANT CHANGE UP MMHG (ref 25–45)
POTASSIUM SERPL-MCNC: 4.9 MMOL/L — SIGNIFICANT CHANGE UP (ref 3.5–5.3)
POTASSIUM SERPL-SCNC: 4.9 MMOL/L — SIGNIFICANT CHANGE UP (ref 3.5–5.3)
PROT SERPL-MCNC: 5.8 G/DL — LOW (ref 6–8.3)
PROT UR-MCNC: 15
PROTHROM AB SERPL-ACNC: 12.6 SEC — SIGNIFICANT CHANGE UP (ref 9.8–12.7)
RBC # BLD: 4 M/UL — SIGNIFICANT CHANGE UP (ref 3.8–5.2)
RBC # FLD: 14.1 % — SIGNIFICANT CHANGE UP (ref 10.3–14.5)
SAO2 % BLDV: 49 % — LOW (ref 67–88)
SODIUM SERPL-SCNC: 146 MMOL/L — HIGH (ref 135–145)
SP GR SPEC: 1.02 — SIGNIFICANT CHANGE UP (ref 1.01–1.02)
TROPONIN I SERPL-MCNC: 0.02 NG/ML — SIGNIFICANT CHANGE UP (ref 0–0.04)
UROBILINOGEN FLD QL: 4
WBC # BLD: 33.4 K/UL — HIGH (ref 3.8–10.5)
WBC # FLD AUTO: 33.4 K/UL — HIGH (ref 3.8–10.5)

## 2018-01-31 PROCEDURE — 99285 EMERGENCY DEPT VISIT HI MDM: CPT

## 2018-01-31 PROCEDURE — 71045 X-RAY EXAM CHEST 1 VIEW: CPT | Mod: 26

## 2018-01-31 RX ORDER — SODIUM CHLORIDE 9 MG/ML
3 INJECTION INTRAMUSCULAR; INTRAVENOUS; SUBCUTANEOUS ONCE
Qty: 0 | Refills: 0 | Status: COMPLETED | OUTPATIENT
Start: 2018-01-31 | End: 2018-01-31

## 2018-01-31 RX ADMIN — SODIUM CHLORIDE 3 MILLILITER(S): 9 INJECTION INTRAMUSCULAR; INTRAVENOUS; SUBCUTANEOUS at 22:24

## 2018-01-31 NOTE — ED ADULT NURSE NOTE - OBJECTIVE STATEMENT
pt came to er via ems c/o syncopal episode. family is with pt.  As per the HHA pt went to bathroom and was found passed out on the toilet seat. pt seemed lethargic as per daughter

## 2018-01-31 NOTE — ED PROVIDER NOTE - OBJECTIVE STATEMENT
89 y/o F pt with PMHx of dementia, H/O aspiration, brought in by daughter and home health aid to ED c/o syncopal episode. Limited HPI secondary to dementia. History obtained from daughter and home health aid. As per daughter, pt lives at home with 24 hour care with private duty. Home health aid reports that home health aid was cleaning pt after she went to the bathroom when she became unresponsive. Daughter notes that pt had a cough. Daughter states that pt also had right chest wall pain after fighting with aid a few days ago and was holding onto her right chest wall.

## 2018-01-31 NOTE — ED PROVIDER NOTE - CARE PLAN
Principal Discharge DX:	Pneumonia due to infectious organism, unspecified laterality, unspecified part of lung  Goal:	bibasilar.

## 2018-01-31 NOTE — ED PROVIDER NOTE - NEUROLOGICAL, MLM
AO x 0. Eyes open to pain. Moving all extremities. Pt withdraws to the pain to the left and right chest wall.

## 2018-01-31 NOTE — ED PROVIDER NOTE - MEDICAL DECISION MAKING DETAILS
Will evaluate for PNA and UTI. Discussion with pt's daughter, if no findings of infectious cause, will discharge pt home as intervention desired. Daughter would not want to do major intervention such as pacemaker.

## 2018-02-01 VITALS
HEART RATE: 65 BPM | TEMPERATURE: 97 F | RESPIRATION RATE: 18 BRPM | SYSTOLIC BLOOD PRESSURE: 147 MMHG | DIASTOLIC BLOOD PRESSURE: 85 MMHG | OXYGEN SATURATION: 99 %

## 2018-02-01 PROCEDURE — 96374 THER/PROPH/DIAG INJ IV PUSH: CPT

## 2018-02-01 PROCEDURE — 84484 ASSAY OF TROPONIN QUANT: CPT

## 2018-02-01 PROCEDURE — 80053 COMPREHEN METABOLIC PANEL: CPT

## 2018-02-01 PROCEDURE — 85730 THROMBOPLASTIN TIME PARTIAL: CPT

## 2018-02-01 PROCEDURE — 85027 COMPLETE CBC AUTOMATED: CPT

## 2018-02-01 PROCEDURE — 74176 CT ABD & PELVIS W/O CONTRAST: CPT | Mod: 26

## 2018-02-01 PROCEDURE — 87086 URINE CULTURE/COLONY COUNT: CPT

## 2018-02-01 PROCEDURE — 74176 CT ABD & PELVIS W/O CONTRAST: CPT

## 2018-02-01 PROCEDURE — 99284 EMERGENCY DEPT VISIT MOD MDM: CPT | Mod: 25

## 2018-02-01 PROCEDURE — 71045 X-RAY EXAM CHEST 1 VIEW: CPT

## 2018-02-01 PROCEDURE — 82803 BLOOD GASES ANY COMBINATION: CPT

## 2018-02-01 PROCEDURE — 93005 ELECTROCARDIOGRAM TRACING: CPT

## 2018-02-01 PROCEDURE — 81001 URINALYSIS AUTO W/SCOPE: CPT

## 2018-02-01 PROCEDURE — 85610 PROTHROMBIN TIME: CPT

## 2018-02-01 RX ORDER — AZITHROMYCIN 500 MG/1
6 TABLET, FILM COATED ORAL
Qty: 24 | Refills: 0 | OUTPATIENT
Start: 2018-02-01 | End: 2018-02-04

## 2018-02-01 RX ORDER — AZITHROMYCIN 500 MG/1
500 TABLET, FILM COATED ORAL ONCE
Qty: 0 | Refills: 0 | Status: COMPLETED | OUTPATIENT
Start: 2018-02-01 | End: 2018-02-01

## 2018-02-01 RX ADMIN — AZITHROMYCIN 250 MILLIGRAM(S): 500 TABLET, FILM COATED ORAL at 03:40

## 2018-02-02 LAB
CULTURE RESULTS: NO GROWTH — SIGNIFICANT CHANGE UP
SPECIMEN SOURCE: SIGNIFICANT CHANGE UP

## 2018-09-20 ENCOUNTER — INPATIENT (INPATIENT)
Facility: HOSPITAL | Age: 83
LOS: 3 days | Discharge: HOME | DRG: 292 | End: 2018-09-24
Attending: INTERNAL MEDICINE | Admitting: INTERNAL MEDICINE
Payer: MEDICARE

## 2018-09-20 VITALS
TEMPERATURE: 98 F | OXYGEN SATURATION: 95 % | SYSTOLIC BLOOD PRESSURE: 159 MMHG | DIASTOLIC BLOOD PRESSURE: 88 MMHG | RESPIRATION RATE: 19 BRPM | WEIGHT: 110.01 LBS | HEIGHT: 59 IN | HEART RATE: 92 BPM

## 2018-09-20 DIAGNOSIS — E87.70 FLUID OVERLOAD, UNSPECIFIED: ICD-10-CM

## 2018-09-20 LAB
ANION GAP SERPL CALC-SCNC: 7 MMOL/L — SIGNIFICANT CHANGE UP (ref 5–17)
BUN SERPL-MCNC: 27 MG/DL — HIGH (ref 7–18)
CALCIUM SERPL-MCNC: 8.4 MG/DL — SIGNIFICANT CHANGE UP (ref 8.4–10.5)
CHLORIDE SERPL-SCNC: 117 MMOL/L — HIGH (ref 96–108)
CK MB CFR SERPL CALC: 1 NG/ML — SIGNIFICANT CHANGE UP (ref 0–3.6)
CK SERPL-CCNC: 88 U/L — SIGNIFICANT CHANGE UP (ref 21–215)
CO2 SERPL-SCNC: 25 MMOL/L — SIGNIFICANT CHANGE UP (ref 22–31)
CREAT SERPL-MCNC: 1.34 MG/DL — HIGH (ref 0.5–1.3)
GLUCOSE SERPL-MCNC: 109 MG/DL — HIGH (ref 70–99)
HCT VFR BLD CALC: 40 % — SIGNIFICANT CHANGE UP (ref 34.5–45)
HGB BLD-MCNC: 12.9 G/DL — SIGNIFICANT CHANGE UP (ref 11.5–15.5)
LYMPHOCYTES # BLD AUTO: 73 % — HIGH (ref 13–44)
MCHC RBC-ENTMCNC: 32.3 GM/DL — SIGNIFICANT CHANGE UP (ref 32–36)
MCHC RBC-ENTMCNC: 36.2 PG — HIGH (ref 27–34)
MCV RBC AUTO: 112 FL — HIGH (ref 80–100)
MONOCYTES NFR BLD AUTO: 4 % — SIGNIFICANT CHANGE UP (ref 2–14)
NEUTROPHILS NFR BLD AUTO: 16 % — LOW (ref 43–77)
NT-PROBNP SERPL-SCNC: 1542 PG/ML — HIGH (ref 0–450)
PLATELET # BLD AUTO: 118 K/UL — LOW (ref 150–400)
POTASSIUM SERPL-MCNC: 5.3 MMOL/L — SIGNIFICANT CHANGE UP (ref 3.5–5.3)
POTASSIUM SERPL-SCNC: 5.3 MMOL/L — SIGNIFICANT CHANGE UP (ref 3.5–5.3)
RBC # BLD: 3.57 M/UL — LOW (ref 3.8–5.2)
RBC # FLD: 14.5 % — SIGNIFICANT CHANGE UP (ref 10.3–14.5)
SODIUM SERPL-SCNC: 149 MMOL/L — HIGH (ref 135–145)
TROPONIN I SERPL-MCNC: 0.02 NG/ML — SIGNIFICANT CHANGE UP (ref 0–0.04)
WBC # BLD: 25.1 K/UL — HIGH (ref 3.8–10.5)
WBC # FLD AUTO: 25.1 K/UL — HIGH (ref 3.8–10.5)

## 2018-09-20 PROCEDURE — 71045 X-RAY EXAM CHEST 1 VIEW: CPT | Mod: 26

## 2018-09-20 PROCEDURE — 99285 EMERGENCY DEPT VISIT HI MDM: CPT

## 2018-09-20 RX ORDER — FUROSEMIDE 40 MG
80 TABLET ORAL ONCE
Qty: 0 | Refills: 0 | Status: COMPLETED | OUTPATIENT
Start: 2018-09-20 | End: 2018-09-20

## 2018-09-20 RX ADMIN — Medication 80 MILLIGRAM(S): at 23:00

## 2018-09-20 NOTE — ED PROVIDER NOTE - PROGRESS NOTE DETAILS
worsening lower extremity edema associated with worsening dyspnea on exertion.  Pt given lasix in ED.  will admit for further treatment.

## 2018-09-20 NOTE — ED PROVIDER NOTE - OBJECTIVE STATEMENT
89 y/o F patient with a significant PMHx of HTN, Dementia, and CLL and significant PSHx of R femur fracture BIB daughter and home health aid presents to the ED with  bilateral leg edema. Pt's daughter states that it is worsened dyspnea on exertion and taking Lasix. Patient denies any fever, chills or any other complaints. NKDA.

## 2018-09-20 NOTE — ED PROVIDER NOTE - MEDICAL DECISION MAKING DETAILS
89 y/o F patient with c/o lower extremity edema. Patient has a fluid overload. Possible CHF? and diuresis. Will do labs, X-Ray, and reassess.

## 2018-09-21 DIAGNOSIS — F03.90 UNSPECIFIED DEMENTIA WITHOUT BEHAVIORAL DISTURBANCE: ICD-10-CM

## 2018-09-21 DIAGNOSIS — Z29.9 ENCOUNTER FOR PROPHYLACTIC MEASURES, UNSPECIFIED: ICD-10-CM

## 2018-09-21 DIAGNOSIS — C91.90 LYMPHOID LEUKEMIA, UNSPECIFIED NOT HAVING ACHIEVED REMISSION: ICD-10-CM

## 2018-09-21 DIAGNOSIS — Z51.5 ENCOUNTER FOR PALLIATIVE CARE: ICD-10-CM

## 2018-09-21 DIAGNOSIS — I50.9 HEART FAILURE, UNSPECIFIED: ICD-10-CM

## 2018-09-21 DIAGNOSIS — E44.0 MODERATE PROTEIN-CALORIE MALNUTRITION: ICD-10-CM

## 2018-09-21 DIAGNOSIS — F32.9 MAJOR DEPRESSIVE DISORDER, SINGLE EPISODE, UNSPECIFIED: ICD-10-CM

## 2018-09-21 DIAGNOSIS — I10 ESSENTIAL (PRIMARY) HYPERTENSION: ICD-10-CM

## 2018-09-21 LAB
ANION GAP SERPL CALC-SCNC: 8 MMOL/L — SIGNIFICANT CHANGE UP (ref 5–17)
BASOPHILS # BLD AUTO: 0.4 K/UL — HIGH (ref 0–0.2)
BASOPHILS NFR BLD AUTO: 1.3 % — SIGNIFICANT CHANGE UP (ref 0–2)
BUN SERPL-MCNC: 28 MG/DL — HIGH (ref 7–18)
CALCIUM SERPL-MCNC: 9.1 MG/DL — SIGNIFICANT CHANGE UP (ref 8.4–10.5)
CHLORIDE SERPL-SCNC: 112 MMOL/L — HIGH (ref 96–108)
CHOLEST SERPL-MCNC: 117 MG/DL — SIGNIFICANT CHANGE UP (ref 10–199)
CO2 SERPL-SCNC: 29 MMOL/L — SIGNIFICANT CHANGE UP (ref 22–31)
CREAT SERPL-MCNC: 1.32 MG/DL — HIGH (ref 0.5–1.3)
EOSINOPHIL # BLD AUTO: 0.1 K/UL — SIGNIFICANT CHANGE UP (ref 0–0.5)
EOSINOPHIL NFR BLD AUTO: 0.3 % — SIGNIFICANT CHANGE UP (ref 0–6)
GLUCOSE SERPL-MCNC: 99 MG/DL — SIGNIFICANT CHANGE UP (ref 70–99)
HBA1C BLD-MCNC: 5.2 % — SIGNIFICANT CHANGE UP (ref 4–5.6)
HCT VFR BLD CALC: 40.5 % — SIGNIFICANT CHANGE UP (ref 34.5–45)
HDLC SERPL-MCNC: 27 MG/DL — LOW
HGB BLD-MCNC: 13.1 G/DL — SIGNIFICANT CHANGE UP (ref 11.5–15.5)
LIPID PNL WITH DIRECT LDL SERPL: 77 MG/DL — SIGNIFICANT CHANGE UP
LYMPHOCYTES # BLD AUTO: 22.2 K/UL — HIGH (ref 1–3.3)
LYMPHOCYTES # BLD AUTO: 78.7 % — HIGH (ref 13–44)
MCHC RBC-ENTMCNC: 32.3 GM/DL — SIGNIFICANT CHANGE UP (ref 32–36)
MCHC RBC-ENTMCNC: 34.9 PG — HIGH (ref 27–34)
MCV RBC AUTO: 108.1 FL — HIGH (ref 80–100)
MONOCYTES # BLD AUTO: 1.5 K/UL — HIGH (ref 0–0.9)
MONOCYTES NFR BLD AUTO: 5.3 % — SIGNIFICANT CHANGE UP (ref 2–14)
NEUTROPHILS # BLD AUTO: 4.1 K/UL — SIGNIFICANT CHANGE UP (ref 1.8–7.4)
NEUTROPHILS NFR BLD AUTO: 14.4 % — LOW (ref 43–77)
PLATELET # BLD AUTO: 118 K/UL — LOW (ref 150–400)
POTASSIUM SERPL-MCNC: 4.2 MMOL/L — SIGNIFICANT CHANGE UP (ref 3.5–5.3)
POTASSIUM SERPL-SCNC: 4.2 MMOL/L — SIGNIFICANT CHANGE UP (ref 3.5–5.3)
RBC # BLD: 3.75 M/UL — LOW (ref 3.8–5.2)
RBC # FLD: 14.2 % — SIGNIFICANT CHANGE UP (ref 10.3–14.5)
SODIUM SERPL-SCNC: 149 MMOL/L — HIGH (ref 135–145)
TOTAL CHOLESTEROL/HDL RATIO MEASUREMENT: 4.3 RATIO — SIGNIFICANT CHANGE UP (ref 3.3–7.1)
TRIGL SERPL-MCNC: 66 MG/DL — SIGNIFICANT CHANGE UP (ref 10–149)
TROPONIN I SERPL-MCNC: 0.03 NG/ML — SIGNIFICANT CHANGE UP (ref 0–0.04)
TROPONIN I SERPL-MCNC: 0.05 NG/ML — HIGH (ref 0–0.04)
TSH SERPL-MCNC: 0.83 UU/ML — SIGNIFICANT CHANGE UP (ref 0.34–4.82)
WBC # BLD: 28.2 K/UL — HIGH (ref 3.8–10.5)
WBC # FLD AUTO: 28.2 K/UL — HIGH (ref 3.8–10.5)

## 2018-09-21 PROCEDURE — 99223 1ST HOSP IP/OBS HIGH 75: CPT

## 2018-09-21 RX ORDER — HEPARIN SODIUM 5000 [USP'U]/ML
5000 INJECTION INTRAVENOUS; SUBCUTANEOUS EVERY 8 HOURS
Qty: 0 | Refills: 0 | Status: DISCONTINUED | OUTPATIENT
Start: 2018-09-21 | End: 2018-09-24

## 2018-09-21 RX ORDER — LANOLIN ALCOHOL/MO/W.PET/CERES
5 CREAM (GRAM) TOPICAL AT BEDTIME
Qty: 0 | Refills: 0 | Status: DISCONTINUED | OUTPATIENT
Start: 2018-09-21 | End: 2018-09-24

## 2018-09-21 RX ORDER — OXYCODONE AND ACETAMINOPHEN 5; 325 MG/1; MG/1
1 TABLET ORAL EVERY 4 HOURS
Qty: 0 | Refills: 0 | Status: DISCONTINUED | OUTPATIENT
Start: 2018-09-21 | End: 2018-09-24

## 2018-09-21 RX ORDER — FUROSEMIDE 40 MG
60 TABLET ORAL EVERY 12 HOURS
Qty: 0 | Refills: 0 | Status: DISCONTINUED | OUTPATIENT
Start: 2018-09-21 | End: 2018-09-22

## 2018-09-21 RX ORDER — FOLIC ACID 0.8 MG
1 TABLET ORAL DAILY
Qty: 0 | Refills: 0 | Status: DISCONTINUED | OUTPATIENT
Start: 2018-09-21 | End: 2018-09-24

## 2018-09-21 RX ORDER — MIRTAZAPINE 45 MG/1
15 TABLET, ORALLY DISINTEGRATING ORAL AT BEDTIME
Qty: 0 | Refills: 0 | Status: DISCONTINUED | OUTPATIENT
Start: 2018-09-21 | End: 2018-09-24

## 2018-09-21 RX ORDER — ASCORBIC ACID 60 MG
500 TABLET,CHEWABLE ORAL DAILY
Qty: 0 | Refills: 0 | Status: DISCONTINUED | OUTPATIENT
Start: 2018-09-21 | End: 2018-09-24

## 2018-09-21 RX ORDER — METOPROLOL TARTRATE 50 MG
25 TABLET ORAL DAILY
Qty: 0 | Refills: 0 | Status: DISCONTINUED | OUTPATIENT
Start: 2018-09-21 | End: 2018-09-24

## 2018-09-21 RX ORDER — DOCUSATE SODIUM 100 MG
100 CAPSULE ORAL
Qty: 0 | Refills: 0 | Status: DISCONTINUED | OUTPATIENT
Start: 2018-09-21 | End: 2018-09-24

## 2018-09-21 RX ORDER — SENNA PLUS 8.6 MG/1
2 TABLET ORAL AT BEDTIME
Qty: 0 | Refills: 0 | Status: DISCONTINUED | OUTPATIENT
Start: 2018-09-21 | End: 2018-09-24

## 2018-09-21 RX ORDER — IPRATROPIUM/ALBUTEROL SULFATE 18-103MCG
3 AEROSOL WITH ADAPTER (GRAM) INHALATION ONCE
Qty: 0 | Refills: 0 | Status: COMPLETED | OUTPATIENT
Start: 2018-09-21 | End: 2018-09-21

## 2018-09-21 RX ADMIN — HEPARIN SODIUM 5000 UNIT(S): 5000 INJECTION INTRAVENOUS; SUBCUTANEOUS at 05:40

## 2018-09-21 RX ADMIN — MIRTAZAPINE 15 MILLIGRAM(S): 45 TABLET, ORALLY DISINTEGRATING ORAL at 21:48

## 2018-09-21 RX ADMIN — Medication 3 MILLILITER(S): at 11:50

## 2018-09-21 RX ADMIN — SENNA PLUS 2 TABLET(S): 8.6 TABLET ORAL at 21:48

## 2018-09-21 RX ADMIN — Medication 1 MILLIGRAM(S): at 11:49

## 2018-09-21 RX ADMIN — HEPARIN SODIUM 5000 UNIT(S): 5000 INJECTION INTRAVENOUS; SUBCUTANEOUS at 21:51

## 2018-09-21 RX ADMIN — Medication 100 MILLIGRAM(S): at 18:13

## 2018-09-21 RX ADMIN — HEPARIN SODIUM 5000 UNIT(S): 5000 INJECTION INTRAVENOUS; SUBCUTANEOUS at 14:54

## 2018-09-21 RX ADMIN — Medication 100 MILLIGRAM(S): at 05:41

## 2018-09-21 RX ADMIN — Medication 60 MILLIGRAM(S): at 18:12

## 2018-09-21 RX ADMIN — Medication 1 TABLET(S): at 11:50

## 2018-09-21 RX ADMIN — Medication 5 MILLIGRAM(S): at 21:48

## 2018-09-21 RX ADMIN — Medication 500 MILLIGRAM(S): at 11:49

## 2018-09-21 RX ADMIN — Medication 60 MILLIGRAM(S): at 05:40

## 2018-09-21 RX ADMIN — Medication 25 MILLIGRAM(S): at 05:41

## 2018-09-21 NOTE — H&P ADULT - PROBLEM SELECTOR PLAN 5
History of CLL monitored as outaptient  WBC around baseline History of CLL monitored as outpatient  WBC around baseline

## 2018-09-21 NOTE — CONSULT NOTE ADULT - PROBLEM SELECTOR RECOMMENDATION 3
minimally ambulatory, dependent on ADLs, confused at baseline, no behavioral issues currently. FAST score 6e

## 2018-09-21 NOTE — H&P ADULT - NSHPPHYSICALEXAM_GEN_ALL_CORE
General - NAD, sitting up in bed, well groomed  Eyes - PERRLA, EOM intact  ENT - Nonicteric sclerae, PERRLA, EOMI. Oropharynx clear. Moist mucous membranes. Conjunctivae appear well perfused.   Neck - No noticeable or palpable swelling, redness or rash around throat or on face  Lymph Nodes - No lymphadenopathy  Cardiovascular - RRR no m/r/g, no JVD, no carotid bruits  Lungs - Bilateral crackles   Abdomen - Normal bowel sounds, abdomen soft and nontender  Extremities - + 3 edema lower extremity, no cyanosis or clubbing  Neurological – Alert and oriented x 3

## 2018-09-21 NOTE — H&P ADULT - PROBLEM SELECTOR PLAN 6
IMPROVE VTE score: 4  Will manage with: Heparin S/C    [ ] Previous VTE                                    3  [ ] Thrombophilia                                  2  [ x] Lower limb paralysis                        2  (unable to hold up >15 seconds)    [ ] Current Cancer (within 6 months)        2   [x] Immobilization > 24 hrs                    1  [ ] ICU/CCU stay > 24 hrs                      1  [x] Age > 60                                         1

## 2018-09-21 NOTE — H&P ADULT - PROBLEM SELECTOR PLAN 1
CHF exacerbation. Likely secondary to Elevated Blood pressures  EKG NSR no T or ST segment changes   Management:  - IV Lasix 60mg Q12hrs  - Check Echocardiogram  - Stricts I/O and daily Weights   - Cardiology Dr Ventura CHF exacerbation. Likely secondary to Elevated Blood pressures  EKG NSR no T or ST segment changes   Management:  - IV Lasix 60mg Q12hrs  - Check Echocardiogram  - Stricts I/O and daily Weights

## 2018-09-21 NOTE — CONSULT NOTE ADULT - PROBLEM SELECTOR RECOMMENDATION 5
Met with patient's daughter Gaby at bedside, discussed current clinical condition, trajectory of illness and goals of care. She expressed that she wishes for her mother to improve and return home w pvt aide. She noted that she has been slowly declining since she broke her hip last year, able to walk a few steps at home. She wishes for conservative medical management. She remains undecided about her code status. She feels like when she made her father DNR he didn't receive the same medical treatment and was "left to starve." Advised that allowing for natural death including DNR/DNI would not preclude her from receiving other medical treatments or feeding tubes if that is her wish. She will think about it. Remains full code for now. Support provided.

## 2018-09-21 NOTE — H&P ADULT - ASSESSMENT
HPI: 89 y/o Female patient with a significant PMHx of HTN, Dementia,  CHF and CLL and significant PSHx of R femur fracture BIB daughter and home health aid presents to the ED with bilateral leg edema. Pt's daughter states that it is worsened dyspnea on exertion and cough (Non productive). Refers that she seem more congested lately and taking Lasix. Patient denies any fever, chills or any other complaints. NKDA    Patient will be admitted to the floor for CHF exacerbation

## 2018-09-21 NOTE — ED ADULT NURSE NOTE - NSIMPLEMENTINTERV_GEN_ALL_ED
Implemented All Universal Safety Interventions:  [place] to call system. Call bell, personal items and telephone within reach. Instruct patient to call for assistance. Room bathroom lighting operational. Non-slip footwear when patient is off stretcher. Physically safe environment: no spills, clutter or unnecessary equipment. Stretcher in lowest position, wheels locked, appropriate side rails in place.

## 2018-09-21 NOTE — H&P ADULT - HISTORY OF PRESENT ILLNESS
89 y/o Female patient with a significant PMHx of HTN, Dementia,  CHF and CLL and significant PSHx of R femur fracture BIB daughter and home health aid presents to the ED with bilateral leg edema. Pt's daughter states that it is worsened dyspnea on exertion and cough( Non productive). Refers that she seem more congested lately  and taking Lasix. Patient denies any fever, chills or any other complaints. NKDA 89 y/o Female patient with a significant PMHx of HTN, Dementia,  CHF and CLL and significant PSHx of R femur fracture BIB daughter and home health aid presents to the ED with bilateral leg edema. Pt's daughter states that it is worsened dyspnea on exertion and cough (Non productive). Refers that she seem more congested lately and taking Lasix. Patient denies any fever, chills or any other complaints. NKDA

## 2018-09-21 NOTE — CONSULT NOTE ADULT - SUBJECTIVE AND OBJECTIVE BOX
HPI:  89 y/o Female patient with a significant PMHx of HTN, Dementia,  CHF and CLL and significant PSHx of R femur fracture BIB daughter and home health aid presents to the ED with bilateral leg edema. Pt's daughter states that it is worsened dyspnea on exertion and cough (Non productive). Refers that she seem more congested lately and taking Lasix. Patient denies any fever, chills or any other complaints. NKDA (21 Sep 2018 02:11)    Interval history: Patient's daughter at bedside reports legs less swollen, appears more comfortable today.     PAST MEDICAL & SURGICAL HISTORY:  UTI (urinary tract infection)  Dementia  Hypertension  Depression  HTN (hypertension)  CLL (chronic lymphocytic leukemia)  Femur fracture, right      SOCIAL HISTORY:  lives in her home w 24 hr pvt aide, has 3 children,   2 yr ago  Admitted from:  home        Surrogate/HCP/Guardian:     Gaby Helton   Phone#:  [phone]    FAMILY HISTORY:  No pertinent family history in first degree relatives    Baseline ADLs (prior to admission):  minimally ambulatory, dependent on ADLs, confused at baseline    Allergies    No Known Allergies    Intolerances      Present Symptoms:      Review of Systems:  Unable to obtain due to poor mentation     MEDICATIONS  (STANDING):  ascorbic acid 500 milliGRAM(s) Oral daily  docusate sodium 100 milliGRAM(s) Oral two times a day  folic acid 1 milliGRAM(s) Oral daily  furosemide   Injectable 60 milliGRAM(s) IV Push every 12 hours  heparin  Injectable 5000 Unit(s) SubCutaneous every 8 hours  melatonin 5 milliGRAM(s) Oral at bedtime  metoprolol succinate ER 25 milliGRAM(s) Oral daily  mirtazapine 15 milliGRAM(s) Oral at bedtime  multivitamin 1 Tablet(s) Oral daily  senna 2 Tablet(s) Oral at bedtime    MEDICATIONS  (PRN):  oxyCODONE    5 mG/acetaminophen 325 mG 1 Tablet(s) Oral every 4 hours PRN Moderate Pain (4 - 6)      PHYSICAL EXAM:    Vital Signs Last 24 Hrs  T(C): 36.6 (21 Sep 2018 11:36), Max: 36.9 (20 Sep 2018 19:44)  T(F): 97.8 (21 Sep 2018 11:36), Max: 98.5 (20 Sep 2018 19:44)  HR: 71 (21 Sep 2018 11:36) (71 - 98)  BP: 131/54 (21 Sep 2018 11:36) (131/54 - 184/105)  BP(mean): --  RR: 18 (21 Sep 2018 11:36) (18 - 20)  SpO2: 98% (21 Sep 2018 11:36) (95% - 98%)       Karnofsky Performance Score/Palliative Performance Status Version2: 40   %     GENERAL: alert, frail,  NAD  HEENT: Atraumatic, sclera anicteric, neck supple  CHEST/LUNG: unlabored  HEART: Regular rate and rhythm    ABDOMEN: Soft, Nontender, Nondistended   MUSCULOSKELETAL:  1+ edema   NERVOUS SYSTEM:  awake, confused, calm  SKIN: No rashes or lesions noted  Oral intake: fair       LABS:                        13.1   28.2  )-----------( 118      ( 21 Sep 2018 05:50 )             40.5     09-21    149<H>  |  112<H>  |  28<H>  ----------------------------<  99  4.2   |  29  |  1.32<H>    Ca    9.1      21 Sep 2018 05:50          RADIOLOGY & ADDITIONAL STUDIES:    ADVANCE DIRECTIVES:

## 2018-09-21 NOTE — CONSULT NOTE ADULT - PROBLEM SELECTOR RECOMMENDATION 9
last echo 2017 showed Grade II diastolic dysfunction, preserved EF. Clinically improving w diuresis, monitor electrolytes, Cr. echo pending.

## 2018-09-21 NOTE — ED ADULT NURSE REASSESSMENT NOTE - NS ED NURSE REASSESS COMMENT FT1
received  pt.in  bed at 0100 from [facility] last. pt.  is  awake  and responsive with confusion. at bedside.pt.not  in distress
Pt remains awake confuse and combative at times breathing unlabored private 1/1 sited at bed side safety and comfort maintained telemetry in progress BOXM

## 2018-09-22 LAB
ALBUMIN SERPL ELPH-MCNC: 2.6 G/DL — LOW (ref 3.5–5)
ALP SERPL-CCNC: 123 U/L — HIGH (ref 40–120)
ALT FLD-CCNC: 47 U/L DA — SIGNIFICANT CHANGE UP (ref 10–60)
ANION GAP SERPL CALC-SCNC: 8 MMOL/L — SIGNIFICANT CHANGE UP (ref 5–17)
AST SERPL-CCNC: 84 U/L — HIGH (ref 10–40)
BILIRUB SERPL-MCNC: 1.8 MG/DL — HIGH (ref 0.2–1.2)
BUN SERPL-MCNC: 30 MG/DL — HIGH (ref 7–18)
CALCIUM SERPL-MCNC: 8.6 MG/DL — SIGNIFICANT CHANGE UP (ref 8.4–10.5)
CHLORIDE SERPL-SCNC: 107 MMOL/L — SIGNIFICANT CHANGE UP (ref 96–108)
CO2 SERPL-SCNC: 32 MMOL/L — HIGH (ref 22–31)
CREAT SERPL-MCNC: 1.39 MG/DL — HIGH (ref 0.5–1.3)
GLUCOSE SERPL-MCNC: 93 MG/DL — SIGNIFICANT CHANGE UP (ref 70–99)
HCT VFR BLD CALC: 37.5 % — SIGNIFICANT CHANGE UP (ref 34.5–45)
HGB BLD-MCNC: 12.2 G/DL — SIGNIFICANT CHANGE UP (ref 11.5–15.5)
MCHC RBC-ENTMCNC: 32.6 GM/DL — SIGNIFICANT CHANGE UP (ref 32–36)
MCHC RBC-ENTMCNC: 35.2 PG — HIGH (ref 27–34)
MCV RBC AUTO: 107.9 FL — HIGH (ref 80–100)
NT-PROBNP SERPL-SCNC: 3280 PG/ML — HIGH (ref 0–450)
PLATELET # BLD AUTO: 119 K/UL — LOW (ref 150–400)
POTASSIUM SERPL-MCNC: 4 MMOL/L — SIGNIFICANT CHANGE UP (ref 3.5–5.3)
POTASSIUM SERPL-SCNC: 4 MMOL/L — SIGNIFICANT CHANGE UP (ref 3.5–5.3)
PROT SERPL-MCNC: 5.1 G/DL — LOW (ref 6–8.3)
RBC # BLD: 3.48 M/UL — LOW (ref 3.8–5.2)
RBC # FLD: 14 % — SIGNIFICANT CHANGE UP (ref 10.3–14.5)
SODIUM SERPL-SCNC: 147 MMOL/L — HIGH (ref 135–145)
WBC # BLD: 22.3 K/UL — HIGH (ref 3.8–10.5)
WBC # FLD AUTO: 22.3 K/UL — HIGH (ref 3.8–10.5)

## 2018-09-22 RX ORDER — FUROSEMIDE 40 MG
40 TABLET ORAL
Qty: 0 | Refills: 0 | Status: DISCONTINUED | OUTPATIENT
Start: 2018-09-22 | End: 2018-09-24

## 2018-09-22 RX ADMIN — HEPARIN SODIUM 5000 UNIT(S): 5000 INJECTION INTRAVENOUS; SUBCUTANEOUS at 06:44

## 2018-09-22 RX ADMIN — Medication 1 MILLIGRAM(S): at 11:58

## 2018-09-22 RX ADMIN — HEPARIN SODIUM 5000 UNIT(S): 5000 INJECTION INTRAVENOUS; SUBCUTANEOUS at 14:04

## 2018-09-22 RX ADMIN — Medication 500 MILLIGRAM(S): at 11:58

## 2018-09-22 RX ADMIN — Medication 25 MILLIGRAM(S): at 06:44

## 2018-09-22 RX ADMIN — Medication 5 MILLIGRAM(S): at 22:13

## 2018-09-22 RX ADMIN — Medication 40 MILLIGRAM(S): at 18:36

## 2018-09-22 RX ADMIN — Medication 1 TABLET(S): at 11:58

## 2018-09-22 RX ADMIN — Medication 100 MILLIGRAM(S): at 06:44

## 2018-09-22 RX ADMIN — MIRTAZAPINE 15 MILLIGRAM(S): 45 TABLET, ORALLY DISINTEGRATING ORAL at 22:13

## 2018-09-22 RX ADMIN — SENNA PLUS 2 TABLET(S): 8.6 TABLET ORAL at 22:13

## 2018-09-22 RX ADMIN — HEPARIN SODIUM 5000 UNIT(S): 5000 INJECTION INTRAVENOUS; SUBCUTANEOUS at 22:13

## 2018-09-22 RX ADMIN — Medication 60 MILLIGRAM(S): at 06:44

## 2018-09-22 NOTE — DISCHARGE NOTE ADULT - HOSPITAL COURSE
Ms. Aiken is a 89 y/o Female patient with a significant PMHx of HTN, Dementia, CHF and CLL and significant PSHx of R femur fracture BIB daughter and home health aid who presented to the ED with bilateral leg edema. Pt's daughter stated that she started having worsened dyspnea on exertion, cough (Non productive), and congestion leading to increased requirement for Lasix. Patient denied any fever, chills or any other complaints. NKDA.    Patient was admitted to the floor for CHF exacerbation. For her CHF (congestive heart failure), she was treated with IV Lasix 40mg Q12hrs. ECHO showed normal LV systolic function (EF = 55 to 60%) and Grade I diastolic dysfunction (Impaired relaxation). Strict I/O and daily Weights were followed. PT recommended discharge to home with home assist. B/l edema resolved significantly by the time she was discharged. For her HTN, she was started on home dose Toprol XL and Lasix. Patient is deemded medically stable to be discharged 9/24.

## 2018-09-22 NOTE — DISCHARGE NOTE ADULT - MEDICATION SUMMARY - MEDICATIONS TO TAKE
I will START or STAY ON the medications listed below when I get home from the hospital:    acetaminophen-oxycodone 325 mg-5 mg oral tablet  -- 1 tab(s) by mouth every 4 hours, As needed, Moderate Pain (4 - 6)  -- Indication: For Pain    mirtazapine 15 mg oral tablet  -- 1 tab(s) by mouth once a day (at bedtime)  -- Indication: For Depression    metoprolol succinate 25 mg oral tablet, extended release  -- 1 tab(s) by mouth once a day  -- Indication: For Hypertension    albuterol-ipratropium 2.5 mg-0.5 mg/3 mL inhalation solution  -- 3 milliliter(s) inhaled once, As needed, Shortness of Breath and/or Wheezing  -- Indication: For Palliative care encounter    furosemide 40 mg oral tablet  -- 1 tab(s) by mouth 2 times a day  -- Indication: For CHF (congestive heart failure)    ferrous sulfate 325 mg (65 mg elemental iron) oral delayed release tablet  -- 1 tab(s) by mouth 3 times a day  -- Indication: For Suplpement    docusate sodium 100 mg oral capsule  -- 1 cap(s) by mouth 2 times a day  -- Indication: For Laxative    senna oral tablet  -- 2 tab(s) by mouth once a day (at bedtime)  -- Indication: For Laxative    Melatonin 5 mg oral tablet  -- 1 tab(s) by mouth once a day (at bedtime)  -- Indication: For Sleeping aid    Multiple Vitamins oral tablet  -- 1 tab(s) by mouth once a day  -- Indication: For Suplpement    ascorbic acid 500 mg oral tablet  -- 1 tab(s) by mouth once a day  -- Indication: For Suplpement    folic acid 1 mg oral tablet  -- 1 tab(s) by mouth once a day  -- Indication: For Suplpement I will START or STAY ON the medications listed below when I get home from the hospital:    mirtazapine 15 mg oral tablet  -- 1 tab(s) by mouth once a day (at bedtime)  -- Indication: For Depression    metoprolol succinate 25 mg oral tablet, extended release  -- 1 tab(s) by mouth once a day  -- Indication: For Hypertension    albuterol-ipratropium 2.5 mg-0.5 mg/3 mL inhalation solution  -- 3 milliliter(s) inhaled once, As needed, Shortness of Breath and/or Wheezing  -- Indication: For Palliative care encounter    furosemide 20 mg oral tablet  -- 1 tab(s) by mouth once a day   -- Avoid prolonged or excessive exposure to direct and/or artificial sunlight while taking this medication.  It is very important that you take or use this exactly as directed.  Do not skip doses or discontinue unless directed by your doctor.  It may be advisable to drink a full glass orange juice or eat a banana daily while taking this medication.    -- Indication: For CHF (congestive heart failure)    ferrous sulfate 325 mg (65 mg elemental iron) oral delayed release tablet  -- 1 tab(s) by mouth 3 times a day  -- Indication: For Suplpement    docusate sodium 100 mg oral capsule  -- 1 cap(s) by mouth 2 times a day  -- Indication: For Laxative    senna oral tablet  -- 2 tab(s) by mouth once a day (at bedtime)  -- Indication: For Laxative    K-Tab 8 mEq oral tablet, extended release  -- 1 tab(s) by mouth once a day   -- It is very important that you take or use this exactly as directed.  Do not skip doses or discontinue unless directed by your doctor.  Medication should be taken with plenty of water.  Swallow whole.  Do not crush.  Take with food or milk.    -- Indication: For Supplement    Melatonin 5 mg oral tablet  -- 1 tab(s) by mouth once a day (at bedtime)  -- Indication: For Sleeping aid    Multiple Vitamins oral tablet  -- 1 tab(s) by mouth once a day  -- Indication: For Suplpement    ascorbic acid 500 mg oral tablet  -- 1 tab(s) by mouth once a day  -- Indication: For Suplpement    folic acid 1 mg oral tablet  -- 1 tab(s) by mouth once a day  -- Indication: For Suplpement

## 2018-09-22 NOTE — DISCHARGE NOTE ADULT - MEDICATION SUMMARY - MEDICATIONS TO STOP TAKING
I will STOP taking the medications listed below when I get home from the hospital:    Augmentin 875 mg-125 mg oral tablet  -- 1 tab(s) by mouth every 12 hours for 4 more days    enoxaparin  -- 30 milligram(s) subcutaneous every 12 hours till 04/08/17    azithromycin 200 mg/5 mL oral liquid  -- 6 milliliter(s) by mouth once a day x 4 days   -- Do not take dairy products, antacids, or iron preparations within one hour of this medication.  Expires___________________  Finish all this medication unless otherwise directed by prescriber.  Shake well before use.

## 2018-09-22 NOTE — DISCHARGE NOTE ADULT - PATIENT PORTAL LINK FT
You can access the [facility] Patient Portal, offered by [facility], by registering with the following website: [email or web address]

## 2018-09-22 NOTE — DISCHARGE NOTE ADULT - PLAN OF CARE
Prevention of future recurrences You were diagnosed with CHF. Your medication regimen for the condition has changed. Take your medications. Follow up with your PCP within 1 week of discharge. SBP < 140 You were diagnosed with hypertension. Your medication regimen for the condition has not changed. Take your medications. Follow up with your PCP within 1 week of discharge. Continue with medication regimen You were diagnosed with depression. Your medication regimen for the condition has not changed. Take your medication. Follow up with your PCP within 1 week of discharge.

## 2018-09-22 NOTE — PROGRESS NOTE ADULT - SUBJECTIVE AND OBJECTIVE BOX
Patient is a 89y old  Female who presents with a chief complaint of SOB and edema of extremities (22 Sep 2018 09:22)      INTERVAL HPI/OVERNIGHT EVENTS:  T(C): 36.8 (09-22-18 @ 15:36), Max: 36.9 (09-22-18 @ 11:35)  HR: 76 (09-22-18 @ 15:36) (69 - 94)  BP: 94/57 (09-22-18 @ 15:36) (94/57 - 135/56)  RR: 17 (09-22-18 @ 15:36) (17 - 18)  SpO2: 98% (09-22-18 @ 15:36) (96% - 98%)  Wt(kg): --  I&O's Summary      LABS:                        12.2   22.3  )-----------( 119      ( 22 Sep 2018 06:10 )             37.5     09-22    147<H>  |  107  |  30<H>  ----------------------------<  93  4.0   |  32<H>  |  1.39<H>    Ca    8.6      22 Sep 2018 06:10    TPro  5.1<L>  /  Alb  2.6<L>  /  TBili  1.8<H>  /  DBili  x   /  AST  84<H>  /  ALT  47  /  AlkPhos  123<H>  09-22        CAPILLARY BLOOD GLUCOSE        REVIEW OF SYSTEMS:    Detail not possible, comfortable.      RADIOLOGY & ADDITIONAL TESTS:    Imaging Personally Reviewed:  [ ] YES  [ ] NO    Consultant(s) Notes Reviewed:  [ ] YES  [ ] NO    PHYSICAL EXAM:  GENERAL: NAD, well-groomed, well-developed  HEAD:  Atraumatic, Normocephalic  EYES: EOMI, PERRLA, conjunctiva and sclera clear  ENMT: No tonsillar erythema, exudates, or enlargement; Moist mucous membranes, Good dentition, No lesions  NECK: Supple, No JVD, Normal thyroid  NERVOUS SYSTEM:  Alert & Oriented X3, Good concentration; Motor Strength 5/5 B/L upper and lower extremities; DTRs 2+ intact and symmetric  CHEST/LUNG: Clear to percussion bilaterally; No rales, rhonchi, wheezing, or rubs  HEART: Regular rate and rhythm; No murmurs, rubs, or gallops  ABDOMEN: Soft, Nontender, Nondistended; Bowel sounds present  EXTREMITIES:  2+ Peripheral Pulses, No clubbing, cyanosis, or edema  LYMPH: No lymphadenopathy noted  SKIN: No rashes or lesions    Care Discussed with Consultants/Other Providers [ ] YES  [ ] NO    ascorbic acid 500 milliGRAM(s) Oral daily  docusate sodium 100 milliGRAM(s) Oral two times a day  folic acid 1 milliGRAM(s) Oral daily  furosemide   Injectable 60 milliGRAM(s) IV Push every 12 hours  heparin  Injectable 5000 Unit(s) SubCutaneous every 8 hours  melatonin 5 milliGRAM(s) Oral at bedtime  metoprolol succinate ER 25 milliGRAM(s) Oral daily  mirtazapine 15 milliGRAM(s) Oral at bedtime  multivitamin 1 Tablet(s) Oral daily  oxyCODONE    5 mG/acetaminophen 325 mG 1 Tablet(s) Oral every 4 hours PRN Moderate Pain (4 - 6)  senna 2 Tablet(s) Oral at bedtime      Ass:    b/l leg edema, leucocytosis, HTN, CHF, CLL.    continue present treatment.    A/P

## 2018-09-22 NOTE — DISCHARGE NOTE ADULT - CARE PLAN
Principal Discharge DX:	CHF (congestive heart failure)  Goal:	Prevention of future recurrences  Assessment and plan of treatment:	You were diagnosed with CHF. Your medication regimen for the condition has changed. Take your medications. Follow up with your PCP within 1 week of discharge.  Secondary Diagnosis:	Hypertension  Goal:	SBP < 140  Assessment and plan of treatment:	You were diagnosed with hypertension. Your medication regimen for the condition has not changed. Take your medications. Follow up with your PCP within 1 week of discharge.  Secondary Diagnosis:	Depression  Goal:	Continue with medication regimen  Assessment and plan of treatment:	You were diagnosed with depression. Your medication regimen for the condition has not changed. Take your medication. Follow up with your PCP within 1 week of discharge.

## 2018-09-22 NOTE — DISCHARGE NOTE ADULT - NS AS DC FOLLOWUP STROKE INST
Stroke (includes: TIA/SAH/ICH/Ischemic Stroke)/Heart Failure Heart Failure Heart Failure/Smoking Cessation

## 2018-09-22 NOTE — PHYSICAL THERAPY INITIAL EVALUATION ADULT - ADDITIONAL COMMENTS
Pt lives with her 24/7 aide, needs assistance with all ADLs. Per caregiver, pt walks to the bathroom w/RW and assist and sits in the w/c the whole day. Pt has no hospital bed/no shower chair. She sits on the toilet bowl when they give her shower, usually physically aggressive/fights with them with ADLs.

## 2018-09-22 NOTE — PHYSICAL THERAPY INITIAL EVALUATION ADULT - RANGE OF MOTION EXAMINATION, REHAB EVAL
bilateral lower extremity ROM was WFL (within functional limits)/except for sh flexion to 90 deg; unable to assess further as pt was resistive to activity/bilateral upper extremity ROM was WFL (within functional limits)

## 2018-09-22 NOTE — PHYSICAL THERAPY INITIAL EVALUATION ADULT - CRITERIA FOR SKILLED THERAPEUTIC INTERVENTIONS
pt is not a candidate for skilled PT services due to patient's refusal to participate in PT activities, with no carry-over, resistive with mobility

## 2018-09-22 NOTE — DISCHARGE NOTE ADULT - NS AS DC STROKE ED MATERIALS
Prescribed Medications/Need for Followup After Discharge/Stroke Education Booklet/Stroke Warning Signs and Symptoms/Risk Factors for Stroke/Call 911 for Stroke

## 2018-09-22 NOTE — DISCHARGE NOTE ADULT - NSTOBACCOHOTLINE_GEN_A_CS
[facility] Smokers Quitline ([phone]) [facility] Smokers Quitline ([phone])

## 2018-09-23 LAB
ANION GAP SERPL CALC-SCNC: 6 MMOL/L — SIGNIFICANT CHANGE UP (ref 5–17)
BUN SERPL-MCNC: 35 MG/DL — HIGH (ref 7–18)
CALCIUM SERPL-MCNC: 8.8 MG/DL — SIGNIFICANT CHANGE UP (ref 8.4–10.5)
CHLORIDE SERPL-SCNC: 108 MMOL/L — SIGNIFICANT CHANGE UP (ref 96–108)
CO2 SERPL-SCNC: 35 MMOL/L — HIGH (ref 22–31)
CREAT SERPL-MCNC: 1.53 MG/DL — HIGH (ref 0.5–1.3)
GLUCOSE SERPL-MCNC: 86 MG/DL — SIGNIFICANT CHANGE UP (ref 70–99)
POTASSIUM SERPL-MCNC: 3.8 MMOL/L — SIGNIFICANT CHANGE UP (ref 3.5–5.3)
POTASSIUM SERPL-SCNC: 3.8 MMOL/L — SIGNIFICANT CHANGE UP (ref 3.5–5.3)
SODIUM SERPL-SCNC: 149 MMOL/L — HIGH (ref 135–145)

## 2018-09-23 RX ADMIN — Medication 1 MILLIGRAM(S): at 11:22

## 2018-09-23 RX ADMIN — HEPARIN SODIUM 5000 UNIT(S): 5000 INJECTION INTRAVENOUS; SUBCUTANEOUS at 15:25

## 2018-09-23 RX ADMIN — Medication 40 MILLIGRAM(S): at 17:06

## 2018-09-23 RX ADMIN — Medication 5 MILLIGRAM(S): at 21:49

## 2018-09-23 RX ADMIN — HEPARIN SODIUM 5000 UNIT(S): 5000 INJECTION INTRAVENOUS; SUBCUTANEOUS at 06:42

## 2018-09-23 RX ADMIN — Medication 100 MILLIGRAM(S): at 06:42

## 2018-09-23 RX ADMIN — Medication 500 MILLIGRAM(S): at 11:22

## 2018-09-23 RX ADMIN — HEPARIN SODIUM 5000 UNIT(S): 5000 INJECTION INTRAVENOUS; SUBCUTANEOUS at 21:49

## 2018-09-23 RX ADMIN — Medication 1 TABLET(S): at 11:22

## 2018-09-23 RX ADMIN — Medication 40 MILLIGRAM(S): at 06:42

## 2018-09-23 RX ADMIN — SENNA PLUS 2 TABLET(S): 8.6 TABLET ORAL at 21:49

## 2018-09-23 RX ADMIN — MIRTAZAPINE 15 MILLIGRAM(S): 45 TABLET, ORALLY DISINTEGRATING ORAL at 21:49

## 2018-09-23 RX ADMIN — Medication 100 MILLIGRAM(S): at 17:06

## 2018-09-23 RX ADMIN — Medication 25 MILLIGRAM(S): at 06:42

## 2018-09-23 NOTE — PROGRESS NOTE ADULT - SUBJECTIVE AND OBJECTIVE BOX
Patient is a 89y old  Female who presents with a chief complaint of SOB and edema of extremities (23 Sep 2018 11:57)      INTERVAL HPI/OVERNIGHT EVENTS: no new complaints    MEDICATIONS  (STANDING):  ascorbic acid 500 milliGRAM(s) Oral daily  docusate sodium 100 milliGRAM(s) Oral two times a day  folic acid 1 milliGRAM(s) Oral daily  furosemide    Tablet 40 milliGRAM(s) Oral two times a day  heparin  Injectable 5000 Unit(s) SubCutaneous every 8 hours  melatonin 5 milliGRAM(s) Oral at bedtime  metoprolol succinate ER 25 milliGRAM(s) Oral daily  mirtazapine 15 milliGRAM(s) Oral at bedtime  multivitamin 1 Tablet(s) Oral daily  senna 2 Tablet(s) Oral at bedtime    MEDICATIONS  (PRN):  oxyCODONE    5 mG/acetaminophen 325 mG 1 Tablet(s) Oral every 4 hours PRN Moderate Pain (4 - 6)      Allergies    No Known Allergies    Intolerances        REVIEW OF SYSTEMS:  CONSTITUTIONAL: No fever, weight loss, or fatigue  RESPIRATORY: No cough, wheezing, chills or hemoptysis; No shortness of breath  CARDIOVASCULAR: No chest pain, palpitations, dizziness, or leg swelling  GASTROINTESTINAL: No abdominal or epigastric pain. No nausea, vomiting, or hematemesis; No diarrhea or constipation. No melena or hematochezia.  NEUROLOGICAL: No headaches, memory loss, loss of strength, numbness, or tremors  SKIN: No itching, burning, rashes, or lesions     Vital Signs Last 24 Hrs  T(C): 36.6 (23 Sep 2018 11:26), Max: 36.8 (22 Sep 2018 15:36)  T(F): 97.8 (23 Sep 2018 11:26), Max: 98.2 (22 Sep 2018 15:36)  HR: 58 (23 Sep 2018 11:26) (58 - 80)  BP: 122/42 (23 Sep 2018 11:26) (94/57 - 138/105)  BP(mean): --  RR: 118 (23 Sep 2018 11:26) (16 - 118)  SpO2: 94% (23 Sep 2018 11:26) (94% - 98%)    PHYSICAL EXAM:  GENERAL: NAD,  HEAD:  Atraumatic, Normocephalic  EYES: EOMI, PERRLA, conjunctiva and sclera clear  NECK: Supple, No JVD, Normal thyroid  CHEST/LUNG: Clear to percussion bilaterally; No rales, rhonchi, wheezing, or rubs  HEART: Regular rate and rhythm; No murmurs, rubs, or gallops  ABDOMEN: Soft, Nontender, Nondistended; Bowel sounds present  NERVOUS SYSTEM:  Alert & Oriented X3, Good concentration; Motor Strength 5/5 B/L   EXTREMITIES:  2+ Peripheral Pulses, No clubbing, cyanosis, or edema  SKIN;    LABS:                        12.2   22.3  )-----------( 119      ( 22 Sep 2018 06:10 )             37.5     09-23    149<H>  |  108  |  35<H>  ----------------------------<  86  3.8   |  35<H>  |  1.53<H>    Ca    8.8      23 Sep 2018 07:34    TPro  5.1<L>  /  Alb  2.6<L>  /  TBili  1.8<H>  /  DBili  x   /  AST  84<H>  /  ALT  47  /  AlkPhos  123<H>  09-22        CAPILLARY BLOOD GLUCOSE          RADIOLOGY & ADDITIONAL TESTS:    < from: Transthoracic Echocardiogram (09.21.18 @ 07:32) >  CONCLUSIONS:  1. Mild mitral regurgitation.  2. Mean transaortic valve gradient equals 5 mm Hg,  estimated aortic valve area equals 1.9 sqcm (by continuity  equation), consistent with mild aortic stenosis.  3. Mild left atrial enlargement.  4. Normal Left Ventricular Systolic Function,  (EF = 55 to  60%)  5. Grade I diastolic dysfunction (Impaired relaxation).  6. RV systolic pressure is normal at  27 mm Hg.    < end of copied text >

## 2018-09-23 NOTE — PROGRESS NOTE ADULT - ASSESSMENT
HPI: 89 y/o Female patient with a significant PMHx of HTN, Dementia,  CHF and CLL and significant PSHx of R femur fracture BIB daughter and home health aid presents to the ED with bilateral leg edema. Pt's daughter states that it is worsened dyspnea on exertion and cough (Non productive). Refers that she seem more congested lately and taking Lasix. Patient denies any fever, chills or any other complaints. NKDA    Patient will be admitted to the floor for CHF exacerbation    Pt seems comfortable no new complaints. DC tmrw . pt needs assistance at home and need  ambulance to get back home . d/w CM tmrw

## 2018-09-23 NOTE — PROGRESS NOTE ADULT - PROBLEM SELECTOR PLAN 1
b/l edema resolved   no SOB   IV Lasix 40mg Q12hrs  - Echocardiogram noted as above  - Stricts I/O and daily Weights  - Cr trending up  - PT: home with assistance

## 2018-09-23 NOTE — PROGRESS NOTE ADULT - SUBJECTIVE AND OBJECTIVE BOX
Patient is a 89y old  Female who presents with a chief complaint of SOB and edema of extremities (22 Sep 2018 09:22)      INTERVAL HPI/OVERNIGHT EVENTS: leg swelling much better.  Vital Signs Last 24 Hrs  T(C): 36.6 (23 Sep 2018 11:26), Max: 36.8 (22 Sep 2018 15:36)  T(F): 97.8 (23 Sep 2018 11:26), Max: 98.2 (22 Sep 2018 15:36)  HR: 58 (23 Sep 2018 11:26) (58 - 80)  BP: 122/42 (23 Sep 2018 11:26) (94/57 - 138/105)  BP(mean): --  RR: 118 (23 Sep 2018 11:26) (16 - 118)  SpO2: 94% (23 Sep 2018 11:26) (94% - 98%)      LABS:                        09-23    149<H>  |  108  |  35<H>  ----------------------------<  86  3.8   |  35<H>  |  1.53<H>    Ca    8.8      23 Sep 2018 07:34    TPro  5.1<L>  /  Alb  2.6<L>  /  TBili  1.8<H>  /  DBili  x   /  AST  84<H>  /  ALT  47  /  AlkPhos  123<H>  09-22                        12.2   22.3  )-----------( 119      ( 22 Sep 2018 06:10 )             37.5           CAPILLARY BLOOD GLUCOSE        REVIEW OF SYSTEMS:    Detail not possible, comfortable.      RADIOLOGY & ADDITIONAL TESTS:    Imaging Personally Reviewed:  [ ] YES  [ ] NO    Consultant(s) Notes Reviewed:  [ ] YES  [ ] NO    PHYSICAL EXAM:  GENERAL: NAD, well-groomed, well-developed  HEAD:  Atraumatic, Normocephalic  EYES: EOMI, PERRLA, conjunctiva and sclera clear  ENMT: No tonsillar erythema, exudates, or enlargement; Moist mucous membranes, Good dentition, No lesions  NECK: Supple, No JVD, Normal thyroid  NERVOUS SYSTEM:  Alert & Oriented X3, Good concentration; Motor Strength 5/5 B/L upper and lower extremities; DTRs 2+ intact and symmetric  CHEST/LUNG: Clear to percussion bilaterally; No rales, rhonchi, wheezing, or rubs  HEART: Regular rate and rhythm; No murmurs, rubs, or gallops  ABDOMEN: Soft, Nontender, Nondistended; Bowel sounds present  EXTREMITIES:  2+ Peripheral Pulses, No clubbing, cyanosis,  edema improoved.  LYMPH: No lymphadenopathy noted  SKIN: No rashes or lesions    Care Discussed with Consultants/Other Providers [ ] YES  [ ] NO    ascorbic acid 500 milliGRAM(s) Oral daily  docusate sodium 100 milliGRAM(s) Oral two times a day  folic acid 1 milliGRAM(s) Oral daily  furosemide   Injectable 60 milliGRAM(s) IV Push every 12 hours  heparin  Injectable 5000 Unit(s) SubCutaneous every 8 hours  melatonin 5 milliGRAM(s) Oral at bedtime  metoprolol succinate ER 25 milliGRAM(s) Oral daily  mirtazapine 15 milliGRAM(s) Oral at bedtime  multivitamin 1 Tablet(s) Oral daily  oxyCODONE    5 mG/acetaminophen 325 mG 1 Tablet(s) Oral every 4 hours PRN Moderate Pain (4 - 6)  senna 2 Tablet(s) Oral at bedtime      Ass:    b/l leg edema, leucocytosis, HTN, CHF, CLL.    continue present treatment.  leucocytosis sec to CLL.    A/P

## 2018-09-24 LAB
ANION GAP SERPL CALC-SCNC: 6 MMOL/L — SIGNIFICANT CHANGE UP (ref 5–17)
BUN SERPL-MCNC: 35 MG/DL — HIGH (ref 7–18)
CALCIUM SERPL-MCNC: 8.7 MG/DL — SIGNIFICANT CHANGE UP (ref 8.4–10.5)
CHLORIDE SERPL-SCNC: 107 MMOL/L — SIGNIFICANT CHANGE UP (ref 96–108)
CO2 SERPL-SCNC: 35 MMOL/L — HIGH (ref 22–31)
CREAT SERPL-MCNC: 1.6 MG/DL — HIGH (ref 0.5–1.3)
GLUCOSE SERPL-MCNC: 153 MG/DL — HIGH (ref 70–99)
HCT VFR BLD CALC: 40.9 % — SIGNIFICANT CHANGE UP (ref 34.5–45)
HGB BLD-MCNC: 12.7 G/DL — SIGNIFICANT CHANGE UP (ref 11.5–15.5)
LYMPHOCYTES # BLD AUTO: 76 % — HIGH (ref 13–44)
MCHC RBC-ENTMCNC: 31.1 GM/DL — LOW (ref 32–36)
MCHC RBC-ENTMCNC: 34.9 PG — HIGH (ref 27–34)
MCV RBC AUTO: 112.2 FL — HIGH (ref 80–100)
MONOCYTES NFR BLD AUTO: 6 % — SIGNIFICANT CHANGE UP (ref 2–14)
NEUTROPHILS NFR BLD AUTO: 16 % — LOW (ref 43–77)
PLATELET # BLD AUTO: 118 K/UL — LOW (ref 150–400)
POTASSIUM SERPL-MCNC: 2.9 MMOL/L — CRITICAL LOW (ref 3.5–5.3)
POTASSIUM SERPL-SCNC: 2.9 MMOL/L — CRITICAL LOW (ref 3.5–5.3)
RBC # BLD: 3.64 M/UL — LOW (ref 3.8–5.2)
RBC # FLD: 14.2 % — SIGNIFICANT CHANGE UP (ref 10.3–14.5)
SODIUM SERPL-SCNC: 148 MMOL/L — HIGH (ref 135–145)
WBC # BLD: 17.9 K/UL — HIGH (ref 3.8–10.5)
WBC # FLD AUTO: 17.9 K/UL — HIGH (ref 3.8–10.5)

## 2018-09-24 PROCEDURE — 93005 ELECTROCARDIOGRAM TRACING: CPT

## 2018-09-24 PROCEDURE — 84484 ASSAY OF TROPONIN QUANT: CPT

## 2018-09-24 PROCEDURE — 82550 ASSAY OF CK (CPK): CPT

## 2018-09-24 PROCEDURE — 82553 CREATINE MB FRACTION: CPT

## 2018-09-24 PROCEDURE — 83880 ASSAY OF NATRIURETIC PEPTIDE: CPT

## 2018-09-24 PROCEDURE — 93306 TTE W/DOPPLER COMPLETE: CPT

## 2018-09-24 PROCEDURE — 80053 COMPREHEN METABOLIC PANEL: CPT

## 2018-09-24 PROCEDURE — 85027 COMPLETE CBC AUTOMATED: CPT

## 2018-09-24 PROCEDURE — 83036 HEMOGLOBIN GLYCOSYLATED A1C: CPT

## 2018-09-24 PROCEDURE — 84443 ASSAY THYROID STIM HORMONE: CPT

## 2018-09-24 PROCEDURE — 97162 PT EVAL MOD COMPLEX 30 MIN: CPT

## 2018-09-24 PROCEDURE — 80048 BASIC METABOLIC PNL TOTAL CA: CPT

## 2018-09-24 PROCEDURE — 94640 AIRWAY INHALATION TREATMENT: CPT

## 2018-09-24 PROCEDURE — 99285 EMERGENCY DEPT VISIT HI MDM: CPT | Mod: 25

## 2018-09-24 PROCEDURE — 71045 X-RAY EXAM CHEST 1 VIEW: CPT

## 2018-09-24 PROCEDURE — 80061 LIPID PANEL: CPT

## 2018-09-24 RX ORDER — SENNA PLUS 8.6 MG/1
2 TABLET ORAL
Qty: 0 | Refills: 0 | COMMUNITY

## 2018-09-24 RX ORDER — SENNA PLUS 8.6 MG/1
2 TABLET ORAL
Qty: 0 | Refills: 0 | COMMUNITY
Start: 2018-09-24

## 2018-09-24 RX ORDER — MIRTAZAPINE 45 MG/1
1.5 TABLET, ORALLY DISINTEGRATING ORAL
Qty: 0 | Refills: 0 | COMMUNITY

## 2018-09-24 RX ORDER — METOPROLOL TARTRATE 50 MG
1 TABLET ORAL
Qty: 0 | Refills: 0 | DISCHARGE
Start: 2018-09-24

## 2018-09-24 RX ORDER — DOCUSATE SODIUM 100 MG
1 CAPSULE ORAL
Qty: 0 | Refills: 0 | COMMUNITY
Start: 2018-09-24

## 2018-09-24 RX ORDER — MIRTAZAPINE 45 MG/1
1 TABLET, ORALLY DISINTEGRATING ORAL
Qty: 0 | Refills: 0 | COMMUNITY
Start: 2018-09-24

## 2018-09-24 RX ORDER — ASCORBIC ACID 60 MG
1 TABLET,CHEWABLE ORAL
Qty: 0 | Refills: 0 | COMMUNITY
Start: 2018-09-24

## 2018-09-24 RX ORDER — FUROSEMIDE 40 MG
1 TABLET ORAL
Qty: 0 | Refills: 0 | COMMUNITY
Start: 2018-09-24

## 2018-09-24 RX ORDER — FOLIC ACID 0.8 MG
1 TABLET ORAL
Qty: 0 | Refills: 0 | COMMUNITY
Start: 2018-09-24

## 2018-09-24 RX ORDER — FUROSEMIDE 40 MG
1 TABLET ORAL
Qty: 0 | Refills: 0 | COMMUNITY

## 2018-09-24 RX ADMIN — Medication 1 TABLET(S): at 12:11

## 2018-09-24 RX ADMIN — Medication 1 MILLIGRAM(S): at 12:11

## 2018-09-24 RX ADMIN — Medication 100 MILLIGRAM(S): at 05:38

## 2018-09-24 RX ADMIN — Medication 500 MILLIGRAM(S): at 12:11

## 2018-09-24 RX ADMIN — HEPARIN SODIUM 5000 UNIT(S): 5000 INJECTION INTRAVENOUS; SUBCUTANEOUS at 05:38

## 2018-09-24 RX ADMIN — HEPARIN SODIUM 5000 UNIT(S): 5000 INJECTION INTRAVENOUS; SUBCUTANEOUS at 14:37

## 2018-09-24 RX ADMIN — Medication 25 MILLIGRAM(S): at 05:38

## 2018-09-24 RX ADMIN — Medication 40 MILLIGRAM(S): at 05:38

## 2018-09-25 VITALS
HEART RATE: 72 BPM | OXYGEN SATURATION: 97 % | TEMPERATURE: 98 F | RESPIRATION RATE: 17 BRPM | DIASTOLIC BLOOD PRESSURE: 74 MMHG | SYSTOLIC BLOOD PRESSURE: 126 MMHG

## 2018-09-27 ENCOUNTER — INPATIENT (INPATIENT)
Facility: HOSPITAL | Age: 83
LOS: 3 days | Discharge: HOME | DRG: 683 | End: 2018-10-01
Attending: INTERNAL MEDICINE | Admitting: INTERNAL MEDICINE
Payer: MEDICARE

## 2018-09-27 VITALS
OXYGEN SATURATION: 98 % | HEART RATE: 67 BPM | WEIGHT: 89.95 LBS | RESPIRATION RATE: 18 BRPM | SYSTOLIC BLOOD PRESSURE: 110 MMHG | TEMPERATURE: 97 F | DIASTOLIC BLOOD PRESSURE: 64 MMHG

## 2018-09-27 DIAGNOSIS — R41.89 OTHER SYMPTOMS AND SIGNS INVOLVING COGNITIVE FUNCTIONS AND AWARENESS: ICD-10-CM

## 2018-09-27 DIAGNOSIS — Z29.9 ENCOUNTER FOR PROPHYLACTIC MEASURES, UNSPECIFIED: ICD-10-CM

## 2018-09-27 DIAGNOSIS — C91.90 LYMPHOID LEUKEMIA, UNSPECIFIED NOT HAVING ACHIEVED REMISSION: ICD-10-CM

## 2018-09-27 DIAGNOSIS — R56.9 UNSPECIFIED CONVULSIONS: ICD-10-CM

## 2018-09-27 DIAGNOSIS — F32.9 MAJOR DEPRESSIVE DISORDER, SINGLE EPISODE, UNSPECIFIED: ICD-10-CM

## 2018-09-27 DIAGNOSIS — I50.9 HEART FAILURE, UNSPECIFIED: ICD-10-CM

## 2018-09-27 DIAGNOSIS — N17.9 ACUTE KIDNEY FAILURE, UNSPECIFIED: ICD-10-CM

## 2018-09-27 LAB
ALBUMIN SERPL ELPH-MCNC: 2.8 G/DL — LOW (ref 3.5–5)
ALP SERPL-CCNC: 131 U/L — HIGH (ref 40–120)
ALT FLD-CCNC: 45 U/L DA — SIGNIFICANT CHANGE UP (ref 10–60)
ANION GAP SERPL CALC-SCNC: 9 MMOL/L — SIGNIFICANT CHANGE UP (ref 5–17)
APPEARANCE UR: CLEAR — SIGNIFICANT CHANGE UP
APTT BLD: 31.8 SEC — SIGNIFICANT CHANGE UP (ref 27.5–37.4)
AST SERPL-CCNC: 72 U/L — HIGH (ref 10–40)
BILIRUB SERPL-MCNC: 1.2 MG/DL — SIGNIFICANT CHANGE UP (ref 0.2–1.2)
BILIRUB UR-MCNC: NEGATIVE — SIGNIFICANT CHANGE UP
BUN SERPL-MCNC: 46 MG/DL — HIGH (ref 7–18)
CALCIUM SERPL-MCNC: 8.3 MG/DL — LOW (ref 8.4–10.5)
CHLORIDE SERPL-SCNC: 108 MMOL/L — SIGNIFICANT CHANGE UP (ref 96–108)
CK SERPL-CCNC: 140 U/L — SIGNIFICANT CHANGE UP (ref 21–215)
CO2 SERPL-SCNC: 30 MMOL/L — SIGNIFICANT CHANGE UP (ref 22–31)
COLOR SPEC: YELLOW — SIGNIFICANT CHANGE UP
CREAT SERPL-MCNC: 2.3 MG/DL — HIGH (ref 0.5–1.3)
DIFF PNL FLD: NEGATIVE — SIGNIFICANT CHANGE UP
GLUCOSE SERPL-MCNC: 151 MG/DL — HIGH (ref 70–99)
GLUCOSE UR QL: NEGATIVE — SIGNIFICANT CHANGE UP
HCT VFR BLD CALC: 39.3 % — SIGNIFICANT CHANGE UP (ref 34.5–45)
HGB BLD-MCNC: 13 G/DL — SIGNIFICANT CHANGE UP (ref 11.5–15.5)
INR BLD: 1.18 RATIO — HIGH (ref 0.88–1.16)
KETONES UR-MCNC: NEGATIVE — SIGNIFICANT CHANGE UP
LACTATE SERPL-SCNC: 3.5 MMOL/L — HIGH (ref 0.7–2)
LEUKOCYTE ESTERASE UR-ACNC: ABNORMAL
LYMPHOCYTES # BLD AUTO: 3 % — LOW (ref 13–44)
MCHC RBC-ENTMCNC: 32.9 GM/DL — SIGNIFICANT CHANGE UP (ref 32–36)
MCHC RBC-ENTMCNC: 35.2 PG — HIGH (ref 27–34)
MCV RBC AUTO: 107.1 FL — HIGH (ref 80–100)
MONOCYTES NFR BLD AUTO: 3 % — SIGNIFICANT CHANGE UP (ref 2–14)
NEUTROPHILS NFR BLD AUTO: 33 % — LOW (ref 43–77)
NITRITE UR-MCNC: NEGATIVE — SIGNIFICANT CHANGE UP
PH UR: 5 — SIGNIFICANT CHANGE UP (ref 5–8)
PLATELET # BLD AUTO: 122 K/UL — LOW (ref 150–400)
POTASSIUM SERPL-MCNC: 3.6 MMOL/L — SIGNIFICANT CHANGE UP (ref 3.5–5.3)
POTASSIUM SERPL-SCNC: 3.6 MMOL/L — SIGNIFICANT CHANGE UP (ref 3.5–5.3)
PROT SERPL-MCNC: 5.9 G/DL — LOW (ref 6–8.3)
PROT UR-MCNC: 15
PROTHROM AB SERPL-ACNC: 12.9 SEC — HIGH (ref 9.8–12.7)
RBC # BLD: 3.68 M/UL — LOW (ref 3.8–5.2)
RBC # FLD: 13.6 % — SIGNIFICANT CHANGE UP (ref 10.3–14.5)
SODIUM SERPL-SCNC: 147 MMOL/L — HIGH (ref 135–145)
SP GR SPEC: 1.01 — SIGNIFICANT CHANGE UP (ref 1.01–1.02)
TROPONIN I SERPL-MCNC: 0.04 NG/ML — SIGNIFICANT CHANGE UP (ref 0–0.04)
UROBILINOGEN FLD QL: NEGATIVE — SIGNIFICANT CHANGE UP
WBC # BLD: 19 K/UL — HIGH (ref 3.8–10.5)
WBC # FLD AUTO: 19 K/UL — HIGH (ref 3.8–10.5)

## 2018-09-27 PROCEDURE — 70450 CT HEAD/BRAIN W/O DYE: CPT | Mod: 26

## 2018-09-27 PROCEDURE — 99285 EMERGENCY DEPT VISIT HI MDM: CPT

## 2018-09-27 PROCEDURE — 93010 ELECTROCARDIOGRAM REPORT: CPT

## 2018-09-27 PROCEDURE — 71045 X-RAY EXAM CHEST 1 VIEW: CPT | Mod: 26

## 2018-09-27 RX ORDER — SENNA PLUS 8.6 MG/1
2 TABLET ORAL AT BEDTIME
Qty: 0 | Refills: 0 | Status: DISCONTINUED | OUTPATIENT
Start: 2018-09-27 | End: 2018-10-01

## 2018-09-27 RX ORDER — FERROUS SULFATE 325(65) MG
325 TABLET ORAL DAILY
Qty: 0 | Refills: 0 | Status: DISCONTINUED | OUTPATIENT
Start: 2018-09-27 | End: 2018-10-01

## 2018-09-27 RX ORDER — LANOLIN ALCOHOL/MO/W.PET/CERES
1 CREAM (GRAM) TOPICAL
Qty: 0 | Refills: 0 | COMMUNITY

## 2018-09-27 RX ORDER — METOPROLOL TARTRATE 50 MG
25 TABLET ORAL
Qty: 0 | Refills: 0 | Status: DISCONTINUED | OUTPATIENT
Start: 2018-09-27 | End: 2018-09-28

## 2018-09-27 RX ORDER — ASCORBIC ACID 60 MG
500 TABLET,CHEWABLE ORAL DAILY
Qty: 0 | Refills: 0 | Status: DISCONTINUED | OUTPATIENT
Start: 2018-09-27 | End: 2018-10-01

## 2018-09-27 RX ORDER — LANOLIN ALCOHOL/MO/W.PET/CERES
5 CREAM (GRAM) TOPICAL AT BEDTIME
Qty: 0 | Refills: 0 | Status: DISCONTINUED | OUTPATIENT
Start: 2018-09-27 | End: 2018-10-01

## 2018-09-27 RX ORDER — HEPARIN SODIUM 5000 [USP'U]/ML
5000 INJECTION INTRAVENOUS; SUBCUTANEOUS EVERY 8 HOURS
Qty: 0 | Refills: 0 | Status: DISCONTINUED | OUTPATIENT
Start: 2018-09-27 | End: 2018-10-01

## 2018-09-27 RX ORDER — CEFTRIAXONE 500 MG/1
1 INJECTION, POWDER, FOR SOLUTION INTRAMUSCULAR; INTRAVENOUS EVERY 24 HOURS
Qty: 0 | Refills: 0 | Status: DISCONTINUED | OUTPATIENT
Start: 2018-09-28 | End: 2018-10-01

## 2018-09-27 RX ORDER — FERROUS SULFATE 325(65) MG
1 TABLET ORAL
Qty: 0 | Refills: 0 | COMMUNITY

## 2018-09-27 RX ORDER — MIRTAZAPINE 45 MG/1
15 TABLET, ORALLY DISINTEGRATING ORAL AT BEDTIME
Qty: 0 | Refills: 0 | Status: DISCONTINUED | OUTPATIENT
Start: 2018-09-27 | End: 2018-10-01

## 2018-09-27 RX ORDER — FOLIC ACID 0.8 MG
1 TABLET ORAL DAILY
Qty: 0 | Refills: 0 | Status: DISCONTINUED | OUTPATIENT
Start: 2018-09-27 | End: 2018-10-01

## 2018-09-27 RX ORDER — CEFTRIAXONE 500 MG/1
1 INJECTION, POWDER, FOR SOLUTION INTRAMUSCULAR; INTRAVENOUS ONCE
Qty: 0 | Refills: 0 | Status: COMPLETED | OUTPATIENT
Start: 2018-09-27 | End: 2018-09-27

## 2018-09-27 RX ORDER — SODIUM CHLORIDE 9 MG/ML
1000 INJECTION INTRAMUSCULAR; INTRAVENOUS; SUBCUTANEOUS
Qty: 0 | Refills: 0 | Status: DISCONTINUED | OUTPATIENT
Start: 2018-09-27 | End: 2018-09-28

## 2018-09-27 RX ORDER — FUROSEMIDE 40 MG
1 TABLET ORAL
Qty: 60 | Refills: 0 | OUTPATIENT
Start: 2018-09-27 | End: 2018-10-26

## 2018-09-27 RX ORDER — SODIUM CHLORIDE 9 MG/ML
1000 INJECTION, SOLUTION INTRAVENOUS
Qty: 0 | Refills: 0 | Status: DISCONTINUED | OUTPATIENT
Start: 2018-09-27 | End: 2018-09-27

## 2018-09-27 RX ORDER — SODIUM CHLORIDE 9 MG/ML
500 INJECTION INTRAMUSCULAR; INTRAVENOUS; SUBCUTANEOUS ONCE
Qty: 0 | Refills: 0 | Status: COMPLETED | OUTPATIENT
Start: 2018-09-27 | End: 2018-09-27

## 2018-09-27 RX ORDER — DOCUSATE SODIUM 100 MG
100 CAPSULE ORAL
Qty: 0 | Refills: 0 | Status: DISCONTINUED | OUTPATIENT
Start: 2018-09-27 | End: 2018-10-01

## 2018-09-27 RX ORDER — CEFTRIAXONE 500 MG/1
INJECTION, POWDER, FOR SOLUTION INTRAMUSCULAR; INTRAVENOUS
Qty: 0 | Refills: 0 | Status: DISCONTINUED | OUTPATIENT
Start: 2018-09-27 | End: 2018-10-01

## 2018-09-27 RX ADMIN — Medication 5 MILLIGRAM(S): at 23:02

## 2018-09-27 RX ADMIN — SODIUM CHLORIDE 500 MILLILITER(S): 9 INJECTION INTRAMUSCULAR; INTRAVENOUS; SUBCUTANEOUS at 18:49

## 2018-09-27 RX ADMIN — SENNA PLUS 2 TABLET(S): 8.6 TABLET ORAL at 23:02

## 2018-09-27 RX ADMIN — SODIUM CHLORIDE 75 MILLILITER(S): 9 INJECTION INTRAMUSCULAR; INTRAVENOUS; SUBCUTANEOUS at 23:29

## 2018-09-27 RX ADMIN — MIRTAZAPINE 15 MILLIGRAM(S): 45 TABLET, ORALLY DISINTEGRATING ORAL at 23:02

## 2018-09-27 RX ADMIN — HEPARIN SODIUM 5000 UNIT(S): 5000 INJECTION INTRAVENOUS; SUBCUTANEOUS at 23:02

## 2018-09-27 RX ADMIN — SODIUM CHLORIDE 500 MILLILITER(S): 9 INJECTION INTRAMUSCULAR; INTRAVENOUS; SUBCUTANEOUS at 19:00

## 2018-09-27 RX ADMIN — CEFTRIAXONE 100 GRAM(S): 500 INJECTION, POWDER, FOR SOLUTION INTRAMUSCULAR; INTRAVENOUS at 23:02

## 2018-09-27 NOTE — ED CLERICAL - NS ED CLERK NOTE PRE-ARRIVAL INFORMATION; ADDITIONAL PRE-ARRIVAL INFORMATION
This patient is enrolled in the readmission program and has active care navigation. This patient can be followed up by the care navigation team within 24 hours. To arrange close follow-up or to obtain additional clinical information about this patient, please call the contact number above. Please consider CDU for management if appropriate.

## 2018-09-27 NOTE — H&P ADULT - PROBLEM SELECTOR PLAN 1
Likely 2/2 dehydration versus UTI  - Afebrile, WBC near baseline although % PMNs doubled, and elevated Lactate (likely 2/2 hypovolemic)  - CT head negative  - CXR clear and markedly improved; no respiratory distress  - Start Rocephin 1 gram x 9/27  - Hold Lasix and c/w IVFs x 24 hours  ***F/u UA and repeat lactate

## 2018-09-27 NOTE — ED ADULT NURSE NOTE - NSIMPLEMENTINTERV_GEN_ALL_ED
Implemented All Fall with Harm Risk Interventions:  [place] to call system. Call bell, personal items and telephone within reach. Instruct patient to call for assistance. Room bathroom lighting operational. Non-slip footwear when patient is off stretcher. Physically safe environment: no spills, clutter or unnecessary equipment. Stretcher in lowest position, wheels locked, appropriate side rails in place. Provide visual cue, wrist band, yellow gown, etc. Monitor gait and stability. Monitor for mental status changes and reorient to person, place, and time. Review medications for side effects contributing to fall risk. Reinforce activity limits and safety measures with patient and family. Provide visual clues: red socks.

## 2018-09-27 NOTE — H&P ADULT - NSHPSOCIALHISTORY_GEN_ALL_CORE
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

## 2018-09-27 NOTE — PATIENT PROFILE ADULT. - VISION (WITH CORRECTIVE LENSES IF THE PATIENT USUALLY WEARS THEM):
Severely impaired: cannot locate objects without hearing or touching them or patient nonresponsive./right eye blindness, left eye vision decrease

## 2018-09-27 NOTE — H&P ADULT - HISTORY OF PRESENT ILLNESS
89 year old female ambulates w/ walker and wheelchair w/ PMH of Dementia AAOx1-2, CLL (diagnosed 8 years ago, never treated), Depression, and CHF brought in by ambulance and HHA for an episode of unresponsiveness w/ drooling - HHA was transporting patient from the bathroom when HHA noticed patient was leaning to her left side w/ eyes deviated to the left and drooling - HHA then shook the patient however symptoms persisted x 1 minute and hence she called 911. Otherwise 24 hours/day HHA reports no prodromal signs, postictal confusion, shaking, fever, SOB, CP, NVDC, abdominal pain, changes in urination, or any other complaints. Of note patient c/o headache day of and day prior of admission treated w/ Tylenol. In the ED patient was straight catheterization for UA w/ 300 output for the day. HHA states no change in PO/fluid intake. Patient recently admitted for CHF exacerbation TTE G1DD w/ normal LV function, and discharged on 80 mg Lasix significant change from prior medication of 20 three times a week). Discussed GOC at bedside, daughter is still undecided and remains fearful that committing to DNR/DNI implies a reduction in medical attention and care.

## 2018-09-27 NOTE — H&P ADULT - PROBLEM SELECTOR PLAN 2
DAMON on CKD-3; Prerenal, likely 2/2 overdiuresis  - Hold Lasix; consider restarting at half home dose  - S/p 500 mL NS; C/w 75 x 24 hours  ***Monitor BMP in AM

## 2018-09-27 NOTE — H&P ADULT - NSHPPHYSICALEXAM_GEN_ALL_CORE
T(C): 36.3 (27 Sep 2018 21:30), Max: 36.3 (27 Sep 2018 21:30)  T(F): 97.4 (27 Sep 2018 21:30), Max: 97.4 (27 Sep 2018 21:30)  HR: 69 (27 Sep 2018 21:30) (67 - 69)  BP: 114/60 (27 Sep 2018 21:30) (110/64 - 114/60)  RR: 18 (27 Sep 2018 21:30) (18 - 18)  SpO2: 98% (27 Sep 2018 21:30) (98% - 98%)

## 2018-09-27 NOTE — ED ADULT TRIAGE NOTE - CHIEF COMPLAINT QUOTE
patient was sitting on a chair and aid observed that the patient was drooling and blank stare x 1 min, yesterday patient was complaining of headache

## 2018-09-27 NOTE — ED PROVIDER NOTE - MEDICAL DECISION MAKING DETAILS
89 y/o F pt with left sided deviation and drooling. Will do labs, CAT scan and reassess. 89 y/o F pt with left sided deviation and drooling. suspicious for seizure. seizure supported by elevated lactic acid. patient has baseline leukocytosis and CLL. doubt sepsis. admit to medicine. pending u/a

## 2018-09-27 NOTE — H&P ADULT - PROBLEM SELECTOR PLAN 6
IMPROVE VTE Individual Risk Assessment    RISK                                                          Points  [] Previous VTE                                           3  [] Thrombophilia                                        2  [] Lower limb paralysis                              2   [x] Current Cancer                                       2   [x] Immobilization > 24 hrs                        1  [] ICU/CCU stay > 24 hours                       1  [x] Age > 60                                                   1    IMPROVE VTE Score: 4, DVT PPx w/ HSQ

## 2018-09-27 NOTE — H&P ADULT - PMH
CHF (congestive heart failure)    CLL (chronic lymphocytic leukemia)    Dementia    Depression    HTN (hypertension)    Hypertension    UTI (urinary tract infection)

## 2018-09-27 NOTE — ED PROVIDER NOTE - OBJECTIVE STATEMENT
89 y/o M pt with a PMHx of Dementia, HTN, UTI BIB HHA for left sided eye deviation and drooling this afternoon. According to HHA, she was wheeling pt to the bathroom and noticed the pt was unresponsive, her eyes deviated to the left and drooling. HHA reports she shook the pt and she was back to her normal self after a minute. HHA pt is at baseline dementia. Patient is c/o headache for the last few days after being discharge from the hospital. HHA pt had dinner last night and breakfast this morning.     Due to Dementia, unable to provide hx. 89 y/o M pt with a PMHx of Dementia, HTN, UTI brought in by ambulance and HHA for left sided eye deviation and drooling this afternoon. According to HHA, she was wheeling pt to the bathroom and noticed the pt was unresponsive, her eyes deviated to the left and drooling. HHA reports she shook the pt and she was back to her normal self after a minute. A pt is at baseline dementia. Patient is c/o headache for the last few days after being discharge from the hospital. A pt had dinner last night and breakfast this morning.     Due to Dementia, unable to provide hx.

## 2018-09-27 NOTE — H&P ADULT - GASTROINTESTINAL

## 2018-09-27 NOTE — H&P ADULT - PROBLEM SELECTOR PLAN 3
Hx of clinical CHF; TTE WNLs, normal EF and G1DD  - CXR clear  - Hold Lasix, resume Metoprolol  - No ACEi 2/2 DAMON Hx of clinical CHF; TTE WNLs, normal EF and G1DD  - CXR clear  - Hold Lasix, resume Metoprolol at half dose  - No ACEi 2/2 DAMON

## 2018-09-28 DIAGNOSIS — E87.8 OTHER DISORDERS OF ELECTROLYTE AND FLUID BALANCE, NOT ELSEWHERE CLASSIFIED: ICD-10-CM

## 2018-09-28 DIAGNOSIS — D72.829 ELEVATED WHITE BLOOD CELL COUNT, UNSPECIFIED: ICD-10-CM

## 2018-09-28 LAB
ALBUMIN SERPL ELPH-MCNC: 2.5 G/DL — LOW (ref 3.5–5)
ALP SERPL-CCNC: 100 U/L — SIGNIFICANT CHANGE UP (ref 40–120)
ALT FLD-CCNC: 39 U/L DA — SIGNIFICANT CHANGE UP (ref 10–60)
ANION GAP SERPL CALC-SCNC: 8 MMOL/L — SIGNIFICANT CHANGE UP (ref 5–17)
ANION GAP SERPL CALC-SCNC: 8 MMOL/L — SIGNIFICANT CHANGE UP (ref 5–17)
AST SERPL-CCNC: 66 U/L — HIGH (ref 10–40)
BILIRUB SERPL-MCNC: 1.2 MG/DL — SIGNIFICANT CHANGE UP (ref 0.2–1.2)
BUN SERPL-MCNC: 41 MG/DL — HIGH (ref 7–18)
BUN SERPL-MCNC: 43 MG/DL — HIGH (ref 7–18)
CALCIUM SERPL-MCNC: 8.1 MG/DL — LOW (ref 8.4–10.5)
CALCIUM SERPL-MCNC: 8.4 MG/DL — SIGNIFICANT CHANGE UP (ref 8.4–10.5)
CHLORIDE SERPL-SCNC: 109 MMOL/L — HIGH (ref 96–108)
CHLORIDE SERPL-SCNC: 111 MMOL/L — HIGH (ref 96–108)
CO2 SERPL-SCNC: 30 MMOL/L — SIGNIFICANT CHANGE UP (ref 22–31)
CO2 SERPL-SCNC: 31 MMOL/L — SIGNIFICANT CHANGE UP (ref 22–31)
CREAT SERPL-MCNC: 1.67 MG/DL — HIGH (ref 0.5–1.3)
CREAT SERPL-MCNC: 1.96 MG/DL — HIGH (ref 0.5–1.3)
CULTURE RESULTS: NO GROWTH — SIGNIFICANT CHANGE UP
GLUCOSE SERPL-MCNC: 119 MG/DL — HIGH (ref 70–99)
GLUCOSE SERPL-MCNC: 88 MG/DL — SIGNIFICANT CHANGE UP (ref 70–99)
HCT VFR BLD CALC: 35.1 % — SIGNIFICANT CHANGE UP (ref 34.5–45)
HGB BLD-MCNC: 11.8 G/DL — SIGNIFICANT CHANGE UP (ref 11.5–15.5)
LACTATE SERPL-SCNC: 1.9 MMOL/L — SIGNIFICANT CHANGE UP (ref 0.7–2)
MAGNESIUM SERPL-MCNC: 1.9 MG/DL — SIGNIFICANT CHANGE UP (ref 1.6–2.6)
MCHC RBC-ENTMCNC: 33.7 GM/DL — SIGNIFICANT CHANGE UP (ref 32–36)
MCHC RBC-ENTMCNC: 37.2 PG — HIGH (ref 27–34)
MCV RBC AUTO: 110.4 FL — HIGH (ref 80–100)
NEUTROPHILS NFR BLD AUTO: SIGNIFICANT CHANGE UP % (ref 43–77)
PHOSPHATE SERPL-MCNC: 2.4 MG/DL — LOW (ref 2.5–4.5)
PLATELET # BLD AUTO: 95 K/UL — LOW (ref 150–400)
POTASSIUM SERPL-MCNC: 3.3 MMOL/L — LOW (ref 3.5–5.3)
POTASSIUM SERPL-MCNC: 3.9 MMOL/L — SIGNIFICANT CHANGE UP (ref 3.5–5.3)
POTASSIUM SERPL-SCNC: 3.3 MMOL/L — LOW (ref 3.5–5.3)
POTASSIUM SERPL-SCNC: 3.9 MMOL/L — SIGNIFICANT CHANGE UP (ref 3.5–5.3)
PROT SERPL-MCNC: 5 G/DL — LOW (ref 6–8.3)
RBC # BLD: 3.18 M/UL — LOW (ref 3.8–5.2)
RBC # FLD: 13.6 % — SIGNIFICANT CHANGE UP (ref 10.3–14.5)
SODIUM SERPL-SCNC: 148 MMOL/L — HIGH (ref 135–145)
SODIUM SERPL-SCNC: 149 MMOL/L — HIGH (ref 135–145)
SPECIMEN SOURCE: SIGNIFICANT CHANGE UP
WBC # BLD: 17.3 K/UL — HIGH (ref 3.8–10.5)
WBC # FLD AUTO: 17.3 K/UL — HIGH (ref 3.8–10.5)

## 2018-09-28 RX ORDER — POTASSIUM CHLORIDE 20 MEQ
40 PACKET (EA) ORAL EVERY 4 HOURS
Qty: 0 | Refills: 0 | Status: DISCONTINUED | OUTPATIENT
Start: 2018-09-28 | End: 2018-09-28

## 2018-09-28 RX ORDER — POTASSIUM CHLORIDE 20 MEQ
20 PACKET (EA) ORAL ONCE
Qty: 0 | Refills: 0 | Status: COMPLETED | OUTPATIENT
Start: 2018-09-28 | End: 2018-09-28

## 2018-09-28 RX ORDER — METOPROLOL TARTRATE 50 MG
12.5 TABLET ORAL
Qty: 0 | Refills: 0 | Status: DISCONTINUED | OUTPATIENT
Start: 2018-09-28 | End: 2018-10-01

## 2018-09-28 RX ORDER — SODIUM CHLORIDE 9 MG/ML
1000 INJECTION, SOLUTION INTRAVENOUS
Qty: 0 | Refills: 0 | Status: DISCONTINUED | OUTPATIENT
Start: 2018-09-28 | End: 2018-09-28

## 2018-09-28 RX ORDER — ACETAMINOPHEN 500 MG
650 TABLET ORAL ONCE
Qty: 0 | Refills: 0 | Status: COMPLETED | OUTPATIENT
Start: 2018-09-28 | End: 2018-09-28

## 2018-09-28 RX ORDER — SODIUM CHLORIDE 9 MG/ML
1000 INJECTION, SOLUTION INTRAVENOUS
Qty: 0 | Refills: 0 | Status: DISCONTINUED | OUTPATIENT
Start: 2018-09-28 | End: 2018-09-30

## 2018-09-28 RX ADMIN — Medication 20 MILLIEQUIVALENT(S): at 15:09

## 2018-09-28 RX ADMIN — HEPARIN SODIUM 5000 UNIT(S): 5000 INJECTION INTRAVENOUS; SUBCUTANEOUS at 15:09

## 2018-09-28 RX ADMIN — Medication 650 MILLIGRAM(S): at 04:30

## 2018-09-28 RX ADMIN — Medication 650 MILLIGRAM(S): at 03:46

## 2018-09-28 RX ADMIN — HEPARIN SODIUM 5000 UNIT(S): 5000 INJECTION INTRAVENOUS; SUBCUTANEOUS at 21:31

## 2018-09-28 RX ADMIN — Medication 100 MILLIGRAM(S): at 06:13

## 2018-09-28 RX ADMIN — Medication 500 MILLIGRAM(S): at 12:22

## 2018-09-28 RX ADMIN — Medication 20 MILLIEQUIVALENT(S): at 17:53

## 2018-09-28 RX ADMIN — Medication 325 MILLIGRAM(S): at 12:22

## 2018-09-28 RX ADMIN — Medication 1 MILLIGRAM(S): at 12:23

## 2018-09-28 RX ADMIN — Medication 1 TABLET(S): at 12:23

## 2018-09-28 RX ADMIN — Medication 5 MILLIGRAM(S): at 21:31

## 2018-09-28 RX ADMIN — Medication 100 MILLIGRAM(S): at 17:53

## 2018-09-28 RX ADMIN — MIRTAZAPINE 15 MILLIGRAM(S): 45 TABLET, ORALLY DISINTEGRATING ORAL at 21:31

## 2018-09-28 RX ADMIN — Medication 12.5 MILLIGRAM(S): at 17:53

## 2018-09-28 RX ADMIN — SODIUM CHLORIDE 75 MILLILITER(S): 9 INJECTION, SOLUTION INTRAVENOUS at 08:19

## 2018-09-28 RX ADMIN — SENNA PLUS 2 TABLET(S): 8.6 TABLET ORAL at 21:31

## 2018-09-28 RX ADMIN — HEPARIN SODIUM 5000 UNIT(S): 5000 INJECTION INTRAVENOUS; SUBCUTANEOUS at 06:13

## 2018-09-28 RX ADMIN — CEFTRIAXONE 100 GRAM(S): 500 INJECTION, POWDER, FOR SOLUTION INTRAMUSCULAR; INTRAVENOUS at 21:30

## 2018-09-28 RX ADMIN — Medication 25 MILLIGRAM(S): at 06:12

## 2018-09-28 NOTE — SWALLOW BEDSIDE ASSESSMENT ADULT - ASR SWALLOW ASPIRATION MONITOR
change of breathing pattern/position upright (90Y)/fever/throat clearing/cough/oral hygiene/gurgly voice/pneumonia/upper respiratory infection

## 2018-09-28 NOTE — PROGRESS NOTE ADULT - PROBLEM SELECTOR PLAN 3
Hx of clinical CHF; TTE WNLs, normal EF and G1DD with recent admission for overload  CXR clear  Lasix on hold, re-start 9/29/18   resume Metoprolol at half dose  No ACEi 2/2 DAMON Hx of clinical CHF; TTE WNLs, normal EF and G1DD with recent admission for overload  CXR clear  Lasix on hold, re-start as per attending   resume Metoprolol at half dose  No ACEi 2/2 DAMON

## 2018-09-28 NOTE — PHYSICAL THERAPY INITIAL EVALUATION ADULT - CRITERIA FOR SKILLED THERAPEUTIC INTERVENTIONS
therapy frequency/rehab potential/anticipated discharge recommendation/impairments found/predicted duration of therapy intervention

## 2018-09-28 NOTE — PHYSICAL THERAPY INITIAL EVALUATION ADULT - RANGE OF MOTION EXAMINATION, REHAB EVAL
bilateral lower extremity ROM was WFL (within functional limits)/bilateral upper extremity ROM was WFL (within functional limits)/AAROM

## 2018-09-28 NOTE — SWALLOW BEDSIDE ASSESSMENT ADULT - ORAL PHASE
Decreased anterior-posterior movement of the bolus/Delayed oral transit time Decreased anterior-posterior movement of the bolus/Delayed oral transit time/Lingual stasis Delayed oral transit time/Decreased anterior-posterior movement of the bolus

## 2018-09-28 NOTE — SWALLOW BEDSIDE ASSESSMENT ADULT - PHARYNGEAL PHASE
Multiple swallows/Decreased laryngeal elevation/Delayed pharyngeal swallow Delayed pharyngeal swallow/Multiple swallows Multiple swallows/Delayed pharyngeal swallow Delayed pharyngeal swallow/Cough post oral intake/Throat clear post oral intake/Multiple swallows Decreased laryngeal elevation/Delayed pharyngeal swallow/Multiple swallows Delayed pharyngeal swallow/Decreased laryngeal elevation/Multiple swallows

## 2018-09-28 NOTE — PROGRESS NOTE ADULT - PROBLEM SELECTOR PLAN 7
Na 147 D51/2NS in progress  K+ 3.3 replenished with oral KCL  monitor BMP Na 147 D51/2NS@ 60 x 48 hr in progress  K+ 3.3 replenished with oral KCL  monitor BMP

## 2018-09-28 NOTE — PROGRESS NOTE ADULT - ASSESSMENT
89 year old female ambulates w/ walker and wheelchair w/ PMH of Dementia AAOx1-2, CLL (diagnosed 8 years ago, never treated), Depression, and CHF brought in by ambulance and HHA for an episode of unresponsiveness w/ drooling - admitting for further evaluation w/ differentials including Lasix induced Hypovolemia UTI, and unlikely Seizure

## 2018-09-28 NOTE — SWALLOW BEDSIDE ASSESSMENT ADULT - COMMENTS
Pt received OOB, seated upright in chair for exam; Alert & encouraged to cooperate w/ exam; daughter & HHA present. . Family states that they have continued giving nectar thick liquids at home.

## 2018-09-28 NOTE — SWALLOW BEDSIDE ASSESSMENT ADULT - SWALLOW EVAL: RECOMMENDED FEEDING/EATING TECHNIQUES
oral hygiene/allow for swallow between intakes/crush medication (when feasible)/small sips/bites/position upright (90 degrees)/maintain upright posture during/after eating for 30 mins/alternate food with liquid

## 2018-09-28 NOTE — PROGRESS NOTE ADULT - PROBLEM SELECTOR PLAN 2
DAMON on CKD-3; Prerenal, likely 2/2 overdiuresis  Lasix on hold  consider restarting at half home dose within 24 hr  gentle hydration 500 mL NS; C/w 75 x 24 hours  Monitor BMP DAMON on CKD-3; Prerenal, likely 2/2 overdiuresis  Lasix on hold for now, re-start after discussion with attending   gentle hydration D51/2NS @60 x 48 hours  Monitor BMP

## 2018-09-28 NOTE — SWALLOW BEDSIDE ASSESSMENT ADULT - SLP PERTINENT HISTORY OF CURRENT PROBLEM
89 y/o F ambulates w/ walker and wheelchair w/ PMH of Dementia AAOx1-2, CLL (diagnosed 8 years ago, never treated), Depression, and CHF brought in by ambulance and HHA for an episode of unresponsiveness w/ drooling, s/p episode of Pt leaning to her left side w/ eyes deviated to the left and drooling. Reportedly, symptoms persisted x 1 minute and hence HHA called 911.

## 2018-09-28 NOTE — SWALLOW BEDSIDE ASSESSMENT ADULT - SWALLOW EVAL: DIAGNOSIS
Pt p/w s&s oropharyngeal dysphagia with psychogenic component c/b oral defensiveness, impaired bolus formation, slow mastication, delayed swallow trigger, and cough on thin liquids. Suspect premature spillage of thin liquids to the pharynx. Pt p/w s&s oropharyngeal dysphagia with psychogenic component c/b oral defensiveness, reduced bolus formation, slow mastication, delayed swallow trigger, and decreased hyolaryngeal elevation.

## 2018-09-28 NOTE — PROGRESS NOTE ADULT - PROBLEM SELECTOR PLAN 1
Head CT negative for acute pathology  Patient afebrile, WBC within normal limits   CXR clear and markedly improved from previous visit  Lasix on hold for 24 hr  IV gentle hydration for 24 hr  patient at baseline per HHA and daughter   monitor vital signs  re-evaluate  f/u repeat lactate  f/u urine culture results Head CT negative for acute pathology  Patient afebrile, WBC within normal limits   CXR clear and markedly improved from previous visit  Lasix on hold for 24 hr  IV gentle hydration for 24 hr  patient at baseline per HHA and daughter   monitor vital signs  re-evaluate  f/u repeat lactate  f/u urine culture results  Speech and swallow evaluation

## 2018-09-29 LAB
ANION GAP SERPL CALC-SCNC: 5 MMOL/L — SIGNIFICANT CHANGE UP (ref 5–17)
BUN SERPL-MCNC: 35 MG/DL — HIGH (ref 7–18)
CALCIUM SERPL-MCNC: 8.6 MG/DL — SIGNIFICANT CHANGE UP (ref 8.4–10.5)
CHLORIDE SERPL-SCNC: 112 MMOL/L — HIGH (ref 96–108)
CO2 SERPL-SCNC: 30 MMOL/L — SIGNIFICANT CHANGE UP (ref 22–31)
CREAT SERPL-MCNC: 1.48 MG/DL — HIGH (ref 0.5–1.3)
GLUCOSE SERPL-MCNC: 114 MG/DL — HIGH (ref 70–99)
HCT VFR BLD CALC: 40 % — SIGNIFICANT CHANGE UP (ref 34.5–45)
HGB BLD-MCNC: 13.1 G/DL — SIGNIFICANT CHANGE UP (ref 11.5–15.5)
MCHC RBC-ENTMCNC: 32.8 GM/DL — SIGNIFICANT CHANGE UP (ref 32–36)
MCHC RBC-ENTMCNC: 35.8 PG — HIGH (ref 27–34)
MCV RBC AUTO: 109.3 FL — HIGH (ref 80–100)
PHOSPHATE SERPL-MCNC: 1.6 MG/DL — LOW (ref 2.5–4.5)
PLATELET # BLD AUTO: 124 K/UL — LOW (ref 150–400)
POTASSIUM SERPL-MCNC: 3.9 MMOL/L — SIGNIFICANT CHANGE UP (ref 3.5–5.3)
POTASSIUM SERPL-SCNC: 3.9 MMOL/L — SIGNIFICANT CHANGE UP (ref 3.5–5.3)
RBC # BLD: 3.66 M/UL — LOW (ref 3.8–5.2)
RBC # FLD: 13.4 % — SIGNIFICANT CHANGE UP (ref 10.3–14.5)
SODIUM SERPL-SCNC: 147 MMOL/L — HIGH (ref 135–145)
WBC # BLD: 25.2 K/UL — HIGH (ref 3.8–10.5)
WBC # FLD AUTO: 25.2 K/UL — HIGH (ref 3.8–10.5)

## 2018-09-29 RX ORDER — POTASSIUM PHOSPHATE, MONOBASIC POTASSIUM PHOSPHATE, DIBASIC 236; 224 MG/ML; MG/ML
15 INJECTION, SOLUTION INTRAVENOUS ONCE
Qty: 0 | Refills: 0 | Status: COMPLETED | OUTPATIENT
Start: 2018-09-29 | End: 2018-09-29

## 2018-09-29 RX ORDER — SODIUM,POTASSIUM PHOSPHATES 278-250MG
1 POWDER IN PACKET (EA) ORAL
Qty: 0 | Refills: 0 | Status: COMPLETED | OUTPATIENT
Start: 2018-09-29 | End: 2018-09-30

## 2018-09-29 RX ADMIN — POTASSIUM PHOSPHATE, MONOBASIC POTASSIUM PHOSPHATE, DIBASIC 62.5 MILLIMOLE(S): 236; 224 INJECTION, SOLUTION INTRAVENOUS at 14:18

## 2018-09-29 RX ADMIN — Medication 5 MILLIGRAM(S): at 21:28

## 2018-09-29 RX ADMIN — HEPARIN SODIUM 5000 UNIT(S): 5000 INJECTION INTRAVENOUS; SUBCUTANEOUS at 06:41

## 2018-09-29 RX ADMIN — Medication 1 MILLIGRAM(S): at 11:28

## 2018-09-29 RX ADMIN — MIRTAZAPINE 15 MILLIGRAM(S): 45 TABLET, ORALLY DISINTEGRATING ORAL at 21:28

## 2018-09-29 RX ADMIN — Medication 12.5 MILLIGRAM(S): at 17:28

## 2018-09-29 RX ADMIN — HEPARIN SODIUM 5000 UNIT(S): 5000 INJECTION INTRAVENOUS; SUBCUTANEOUS at 14:19

## 2018-09-29 RX ADMIN — SENNA PLUS 2 TABLET(S): 8.6 TABLET ORAL at 21:28

## 2018-09-29 RX ADMIN — Medication 500 MILLIGRAM(S): at 11:28

## 2018-09-29 RX ADMIN — HEPARIN SODIUM 5000 UNIT(S): 5000 INJECTION INTRAVENOUS; SUBCUTANEOUS at 21:28

## 2018-09-29 RX ADMIN — Medication 100 MILLIGRAM(S): at 17:28

## 2018-09-29 RX ADMIN — CEFTRIAXONE 100 GRAM(S): 500 INJECTION, POWDER, FOR SOLUTION INTRAMUSCULAR; INTRAVENOUS at 21:29

## 2018-09-29 RX ADMIN — Medication 1 TABLET(S): at 21:29

## 2018-09-29 RX ADMIN — Medication 1 TABLET(S): at 14:20

## 2018-09-29 RX ADMIN — Medication 12.5 MILLIGRAM(S): at 06:41

## 2018-09-29 RX ADMIN — Medication 100 MILLIGRAM(S): at 06:41

## 2018-09-29 RX ADMIN — Medication 325 MILLIGRAM(S): at 11:28

## 2018-09-29 RX ADMIN — Medication 1 TABLET(S): at 17:28

## 2018-09-29 RX ADMIN — Medication 1 TABLET(S): at 11:28

## 2018-09-29 NOTE — PROGRESS NOTE ADULT - PROBLEM SELECTOR PLAN 1
Head CT negative for acute pathology  Patient afebrile, WBC within normal limits   CXR clear and markedly improved from previous visit  Lasix on hold for 24 hr  IV gentle hydration for 24 hr  patient at baseline per HHA and daughter   monitor vital signs  re-evaluate  f/u repeat lactate  f/u urine culture results  Speech and swallow evaluation

## 2018-09-29 NOTE — PROGRESS NOTE ADULT - PROBLEM SELECTOR PLAN 2
DAMON on CKD-3; Prerenal, likely 2/2 overdiuresis  Lasix on hold for now, re-start after discussion with attending   gentle hydration D51/2NS @60 x 48 hours  Monitor BMP

## 2018-09-29 NOTE — PROGRESS NOTE ADULT - PROBLEM SELECTOR PLAN 3
Hx of clinical CHF; TTE WNLs, normal EF and G1DD with recent admission for overload  CXR clear  Lasix on hold, re-start as per attending   resume Metoprolol at half dose  No ACEi 2/2 DAMON

## 2018-09-30 LAB
ANION GAP SERPL CALC-SCNC: 8 MMOL/L — SIGNIFICANT CHANGE UP (ref 5–17)
BUN SERPL-MCNC: 30 MG/DL — HIGH (ref 7–18)
CALCIUM SERPL-MCNC: 8.2 MG/DL — LOW (ref 8.4–10.5)
CHLORIDE SERPL-SCNC: 115 MMOL/L — HIGH (ref 96–108)
CO2 SERPL-SCNC: 27 MMOL/L — SIGNIFICANT CHANGE UP (ref 22–31)
CREAT SERPL-MCNC: 1.26 MG/DL — SIGNIFICANT CHANGE UP (ref 0.5–1.3)
GLUCOSE SERPL-MCNC: 92 MG/DL — SIGNIFICANT CHANGE UP (ref 70–99)
HCT VFR BLD CALC: 35.7 % — SIGNIFICANT CHANGE UP (ref 34.5–45)
HGB BLD-MCNC: 11.6 G/DL — SIGNIFICANT CHANGE UP (ref 11.5–15.5)
MCHC RBC-ENTMCNC: 32.6 GM/DL — SIGNIFICANT CHANGE UP (ref 32–36)
MCHC RBC-ENTMCNC: 35.8 PG — HIGH (ref 27–34)
MCV RBC AUTO: 109.8 FL — HIGH (ref 80–100)
PLATELET # BLD AUTO: 95 K/UL — LOW (ref 150–400)
POTASSIUM SERPL-MCNC: 4.2 MMOL/L — SIGNIFICANT CHANGE UP (ref 3.5–5.3)
POTASSIUM SERPL-SCNC: 4.2 MMOL/L — SIGNIFICANT CHANGE UP (ref 3.5–5.3)
RBC # BLD: 3.25 M/UL — LOW (ref 3.8–5.2)
RBC # FLD: 13.8 % — SIGNIFICANT CHANGE UP (ref 10.3–14.5)
SODIUM SERPL-SCNC: 150 MMOL/L — HIGH (ref 135–145)
WBC # BLD: 18.5 K/UL — HIGH (ref 3.8–10.5)
WBC # FLD AUTO: 18.5 K/UL — HIGH (ref 3.8–10.5)

## 2018-09-30 RX ORDER — SODIUM CHLORIDE 9 MG/ML
1000 INJECTION, SOLUTION INTRAVENOUS
Qty: 0 | Refills: 0 | Status: DISCONTINUED | OUTPATIENT
Start: 2018-09-30 | End: 2018-10-01

## 2018-09-30 RX ADMIN — MIRTAZAPINE 15 MILLIGRAM(S): 45 TABLET, ORALLY DISINTEGRATING ORAL at 21:53

## 2018-09-30 RX ADMIN — Medication 100 MILLIGRAM(S): at 18:04

## 2018-09-30 RX ADMIN — SODIUM CHLORIDE 50 MILLILITER(S): 9 INJECTION, SOLUTION INTRAVENOUS at 12:26

## 2018-09-30 RX ADMIN — Medication 325 MILLIGRAM(S): at 12:25

## 2018-09-30 RX ADMIN — Medication 1 TABLET(S): at 12:25

## 2018-09-30 RX ADMIN — Medication 1 TABLET(S): at 08:14

## 2018-09-30 RX ADMIN — HEPARIN SODIUM 5000 UNIT(S): 5000 INJECTION INTRAVENOUS; SUBCUTANEOUS at 21:53

## 2018-09-30 RX ADMIN — Medication 1 MILLIGRAM(S): at 12:25

## 2018-09-30 RX ADMIN — SENNA PLUS 2 TABLET(S): 8.6 TABLET ORAL at 21:53

## 2018-09-30 RX ADMIN — HEPARIN SODIUM 5000 UNIT(S): 5000 INJECTION INTRAVENOUS; SUBCUTANEOUS at 13:51

## 2018-09-30 RX ADMIN — CEFTRIAXONE 100 GRAM(S): 500 INJECTION, POWDER, FOR SOLUTION INTRAMUSCULAR; INTRAVENOUS at 21:53

## 2018-09-30 RX ADMIN — Medication 12.5 MILLIGRAM(S): at 05:25

## 2018-09-30 RX ADMIN — Medication 5 MILLIGRAM(S): at 21:52

## 2018-09-30 RX ADMIN — HEPARIN SODIUM 5000 UNIT(S): 5000 INJECTION INTRAVENOUS; SUBCUTANEOUS at 05:26

## 2018-09-30 RX ADMIN — Medication 100 MILLIGRAM(S): at 05:25

## 2018-09-30 RX ADMIN — Medication 500 MILLIGRAM(S): at 12:25

## 2018-09-30 NOTE — PROGRESS NOTE ADULT - PROBLEM SELECTOR PLAN 8
trending down from 19 to 17.3 today  Ceftriaxone continued  f/u urine cultures

## 2018-09-30 NOTE — CONSULT NOTE ADULT - SUBJECTIVE AND OBJECTIVE BOX
HPI:  89 year old female ambulates w/ walker and wheelchair w/ PMH of Dementia AAOx1-2, CLL (diagnosed 8 years ago, never treated), Depression, and CHF brought in by ambulance and HHA for an episode of unresponsiveness w/ drooling - HHA was transporting patient from the bathroom when HHA noticed patient was leaning to her left side w/ eyes deviated to the left and drooling - HHA then shook the patient however symptoms persisted x 1 minute and hence she called 911. Otherwise 24 hours/day HHA reports no prodromal signs, postictal confusion, shaking, fever, SOB, CP, NVDC, abdominal pain, changes in urination, or any other complaints. Of note patient c/o headache day of and day prior of admission treated w/ Tylenol. In the ED patient was straight catheterization for UA w/ 300 output for the day. HHA states no change in PO/fluid intake. Patient recently admitted for CHF exacerbation TTE G1DD w/ normal LV function, and discharged on 80 mg Lasix significant change from prior medication of 20 three times a week).   There is no previous history of kidney disease, blood in urine of difficulty passing urine.      PMH:   CHF (congestive heart failure)  UTI (urinary tract infection)  Dementia  Hypertension  Depression  HTN (hypertension)  CLL (chronic lymphocytic leukemia)      PSH:   Femur fracture, right    FAMILY HISTORY:  No pertinent family history in first degree relatives    Social History:  non-smoker/ non-alcoholic     Home Meds:  MEDICATIONS  (STANDING):  ascorbic acid 500 milliGRAM(s) Oral daily  cefTRIAXone   IVPB      cefTRIAXone   IVPB 1 Gram(s) IV Intermittent every 24 hours  dextrose 5%. 1000 milliLiter(s) (50 mL/Hr) IV Continuous <Continuous>  docusate sodium 100 milliGRAM(s) Oral two times a day  ferrous    sulfate 325 milliGRAM(s) Oral daily  folic acid 1 milliGRAM(s) Oral daily  heparin  Injectable 5000 Unit(s) SubCutaneous every 8 hours  melatonin 5 milliGRAM(s) Oral at bedtime  metoprolol tartrate 12.5 milliGRAM(s) Oral two times a day  mirtazapine 15 milliGRAM(s) Oral at bedtime  multivitamin 1 Tablet(s) Oral daily  senna 2 Tablet(s) Oral at bedtime    Allergies:  No Known Allergies    REVIEW OF SYSTEMS:    CONSTITUTIONAL: No fever or chills  EYES: No eye pain or discharge  ENMT:  No throat pain  NECK: No pain or stiffness  RESPIRATORY: No cough. No shortness of breath  CARDIOVASCULAR: No chest pain, palpitations or leg swelling  GASTROINTESTINAL: No abdominal or epigastric pain. No nausea, vomiting, or diarrhea  GENITOURINARY: No dysuria, frequency, hematuria, or incontinence  NEUROLOGICAL: No headaches  SKIN: No itching, burning, rashes    Vital Signs Last 24 Hrs  T(C): 36.9 (30 Sep 2018 14:32), Max: 36.9 (30 Sep 2018 14:32)  T(F): 98.4 (30 Sep 2018 14:32), Max: 98.4 (30 Sep 2018 14:32)  HR: 79 (30 Sep 2018 14:32) (69 - 79)  BP: 104/89 (30 Sep 2018 14:32) (104/89 - 115/60)  BP(mean): --  RR: 18 (30 Sep 2018 14:32) (16 - 18)  SpO2: 97% (30 Sep 2018 14:32) (97% - 97%)    09-29 @ 07:01 - 09-30 @ 07:00  --------------------------------------------------------  IN: 250 mL / OUT: 0 mL / NET: 250 mL    09-30 @ 07:01 - 09-30 @ 20:34  --------------------------------------------------------  IN: 50 mL / OUT: 0 mL / NET: 50 mL    PHYSICAL EXAM:    GENERAL: NAD, fairly nourished, well-developed  HEAD:  Atraumatic, Normocephalic  EYES: Sclera anicteric  ENMT: Dry mucous membranes  NECK: Supple, No JVD  NERVOUS SYSTEM:  awake, alert  CHEST/LUNG: Clear   HEART: Regular rate and rhythm; No murmurs  ABDOMEN: Soft, Nontender, Nondistended; Bowel sounds present  EXTREMITIES:  No edema  SKIN: Decreased turgor    LABS:                        11.6   18.5  )-----------( 95       ( 30 Sep 2018 06:59 )             35.7     09-30    150<H>  |  115<H>  |  30<H>  ----------------------------<  92  4.2   |  27  |  1.26    Ca    8.2<L>      30 Sep 2018 06:59  Phos  1.6     09-29    ASSESSMENT AND PLAN:  1. DAMON secondary to volume depletion  2. Hypernatremia due to water deficit  3. Hx of CHF  4. Anemia  Continue D5W IVF  Encourage PO fluid intake  Keep patient euvolemic and renal diet  Avoid Nephrtoxic Meds/ Agents such as NSAIDs, Gadolinium contrast, Phosphate containing enemas, etc..)  Adjust Medications according to eGFR  Follow BMP  Many thanks

## 2018-09-30 NOTE — PROGRESS NOTE ADULT - PROBLEM SELECTOR PLAN 4
At baseline; never treated, no further intervention.

## 2018-10-01 ENCOUNTER — TRANSCRIPTION ENCOUNTER (OUTPATIENT)
Age: 83
End: 2018-10-01

## 2018-10-01 VITALS
WEIGHT: 116.84 LBS | TEMPERATURE: 97 F | OXYGEN SATURATION: 100 % | HEART RATE: 65 BPM | RESPIRATION RATE: 16 BRPM | DIASTOLIC BLOOD PRESSURE: 43 MMHG | SYSTOLIC BLOOD PRESSURE: 101 MMHG

## 2018-10-01 LAB
ANION GAP SERPL CALC-SCNC: 9 MMOL/L — SIGNIFICANT CHANGE UP (ref 5–17)
BUN SERPL-MCNC: 27 MG/DL — HIGH (ref 7–18)
CALCIUM SERPL-MCNC: 7.9 MG/DL — LOW (ref 8.4–10.5)
CHLORIDE SERPL-SCNC: 111 MMOL/L — HIGH (ref 96–108)
CO2 SERPL-SCNC: 22 MMOL/L — SIGNIFICANT CHANGE UP (ref 22–31)
CREAT SERPL-MCNC: 1.15 MG/DL — SIGNIFICANT CHANGE UP (ref 0.5–1.3)
GLUCOSE SERPL-MCNC: 106 MG/DL — HIGH (ref 70–99)
HCT VFR BLD CALC: 36.9 % — SIGNIFICANT CHANGE UP (ref 34.5–45)
HGB BLD-MCNC: 11.7 G/DL — SIGNIFICANT CHANGE UP (ref 11.5–15.5)
MAGNESIUM SERPL-MCNC: 1.9 MG/DL — SIGNIFICANT CHANGE UP (ref 1.6–2.6)
MCHC RBC-ENTMCNC: 31.8 GM/DL — LOW (ref 32–36)
MCHC RBC-ENTMCNC: 35.8 PG — HIGH (ref 27–34)
MCV RBC AUTO: 112.7 FL — HIGH (ref 80–100)
PHOSPHATE SERPL-MCNC: 3.1 MG/DL — SIGNIFICANT CHANGE UP (ref 2.5–4.5)
PLATELET # BLD AUTO: 106 K/UL — LOW (ref 150–400)
POTASSIUM SERPL-MCNC: 3.8 MMOL/L — SIGNIFICANT CHANGE UP (ref 3.5–5.3)
POTASSIUM SERPL-SCNC: 3.8 MMOL/L — SIGNIFICANT CHANGE UP (ref 3.5–5.3)
RBC # BLD: 3.27 M/UL — LOW (ref 3.8–5.2)
RBC # FLD: 13.5 % — SIGNIFICANT CHANGE UP (ref 10.3–14.5)
SODIUM SERPL-SCNC: 142 MMOL/L — SIGNIFICANT CHANGE UP (ref 135–145)
WBC # BLD: 21.2 K/UL — HIGH (ref 3.8–10.5)
WBC # FLD AUTO: 21.2 K/UL — HIGH (ref 3.8–10.5)

## 2018-10-01 RX ORDER — POTASSIUM CHLORIDE 20 MEQ
1 PACKET (EA) ORAL
Qty: 30 | Refills: 0
Start: 2018-10-01 | End: 2018-10-30

## 2018-10-01 RX ORDER — FUROSEMIDE 40 MG
1 TABLET ORAL
Qty: 30 | Refills: 0
Start: 2018-10-01 | End: 2018-10-30

## 2018-10-01 RX ADMIN — Medication 1 TABLET(S): at 11:09

## 2018-10-01 RX ADMIN — Medication 1 MILLIGRAM(S): at 11:09

## 2018-10-01 RX ADMIN — Medication 500 MILLIGRAM(S): at 11:09

## 2018-10-01 RX ADMIN — Medication 10 MILLIGRAM(S): at 13:52

## 2018-10-01 RX ADMIN — Medication 100 MILLIGRAM(S): at 07:05

## 2018-10-01 RX ADMIN — SODIUM CHLORIDE 50 MILLILITER(S): 9 INJECTION, SOLUTION INTRAVENOUS at 11:14

## 2018-10-01 RX ADMIN — Medication 325 MILLIGRAM(S): at 11:09

## 2018-10-01 RX ADMIN — HEPARIN SODIUM 5000 UNIT(S): 5000 INJECTION INTRAVENOUS; SUBCUTANEOUS at 07:05

## 2018-10-01 RX ADMIN — Medication 12.5 MILLIGRAM(S): at 07:05

## 2018-10-01 NOTE — DISCHARGE NOTE ADULT - CARE PROVIDER_API CALL
Abril Hernandez Dr[address] [facility]    Internal medicine    [address] [place] [address] [phone]  Phone: (   )    -  Fax: (   )    -

## 2018-10-01 NOTE — DISCHARGE NOTE ADULT - PATIENT PORTAL LINK FT
You can access the [facility] Portal, offered by [facility], by registering with the following website: [email or web address]

## 2018-10-01 NOTE — DISCHARGE NOTE ADULT - MEDICATION SUMMARY - MEDICATIONS TO TAKE
I will START or STAY ON the medications listed below when I get home from the hospital:    mirtazapine 15 mg oral tablet  -- 1 tab(s) by mouth once a day (at bedtime)  -- Indication: For Dementia    metoprolol succinate 25 mg oral tablet, extended release  -- 1 tab(s) by mouth once a day  -- Indication: For htn    albuterol-ipratropium 2.5 mg-0.5 mg/3 mL inhalation solution  -- 3 milliliter(s) inhaled once, As needed, Shortness of Breath and/or Wheezing  -- Indication: For Shortness of breath    ferrous sulfate 325 mg (65 mg elemental iron) oral delayed release tablet  -- 1 tab(s) by mouth 3 times a day  -- Indication: For Anemia    docusate sodium 100 mg oral capsule  -- 1 cap(s) by mouth 2 times a day  -- Indication: For Constiaptino    senna oral tablet  -- 2 tab(s) by mouth once a day (at bedtime)  -- Indication: For Constipation    Melatonin 5 mg oral tablet  -- 1 tab(s) by mouth once a day (at bedtime)  -- Indication: For Sleep    Multiple Vitamins oral tablet  -- 1 tab(s) by mouth once a day  -- Indication: For Supplement    ascorbic acid 500 mg oral tablet  -- 1 tab(s) by mouth once a day  -- Indication: For Supplemetn    folic acid 1 mg oral tablet  -- 1 tab(s) by mouth once a day  -- Indication: For Supplement I will START or STAY ON the medications listed below when I get home from the hospital:    mirtazapine 15 mg oral tablet  -- 1 tab(s) by mouth once a day (at bedtime)  -- Indication: For Dementia    metoprolol succinate 25 mg oral tablet, extended release  -- 1 tab(s) by mouth once a day  -- Indication: For htn    albuterol-ipratropium 2.5 mg-0.5 mg/3 mL inhalation solution  -- 3 milliliter(s) inhaled once, As needed, Shortness of Breath and/or Wheezing  -- Indication: For Shortness of breath    furosemide 20 mg oral tablet  -- 1 tab(s) by mouth once a day   -- Avoid prolonged or excessive exposure to direct and/or artificial sunlight while taking this medication.  It is very important that you take or use this exactly as directed.  Do not skip doses or discontinue unless directed by your doctor.  It may be advisable to drink a full glass orange juice or eat a banana daily while taking this medication.    -- Indication: For CHF (congestive heart failure)    ferrous sulfate 325 mg (65 mg elemental iron) oral delayed release tablet  -- 1 tab(s) by mouth 3 times a day  -- Indication: For Anemia    docusate sodium 100 mg oral capsule  -- 1 cap(s) by mouth 2 times a day  -- Indication: For Constipation    senna oral tablet  -- 2 tab(s) by mouth once a day (at bedtime)  -- Indication: For Constipation    K-Tab 8 mEq oral tablet, extended release  -- 1 tab(s) by mouth once a day   -- It is very important that you take or use this exactly as directed.  Do not skip doses or discontinue unless directed by your doctor.  Medication should be taken with plenty of water.  Swallow whole.  Do not crush.  Take with food or milk.    -- Indication: For Supplement    Melatonin 5 mg oral tablet  -- 1 tab(s) by mouth once a day (at bedtime)  -- Indication: For Sleep    Multiple Vitamins oral tablet  -- 1 tab(s) by mouth once a day  -- Indication: For Supplement    ascorbic acid 500 mg oral tablet  -- 1 tab(s) by mouth once a day  -- Indication: For Supplemetn    folic acid 1 mg oral tablet  -- 1 tab(s) by mouth once a day  -- Indication: For Supplement

## 2018-10-01 NOTE — PROGRESS NOTE ADULT - NSHPATTENDINGPLANDISCUSS_GEN_ALL_CORE
pts daughter and np
care taker at bedside, ID for persistent high WBC.
attending Dr Schmitt
d/w daughter yesterday evening, and bedside caretaker now.

## 2018-10-01 NOTE — DISCHARGE NOTE ADULT - CARE PLAN
Principal Discharge DX:	DAMON (acute kidney injury)  Goal:	prevent further injury  Assessment and plan of treatment:	You were treated for acute kidney injury, now much improved. Follow up with your PMD in 2 days to repeat blood work to follow up kidney function  Secondary Diagnosis:	Electrolyte disturbance  Assessment and plan of treatment:	Your sodium levels were elevated, now returned to normal. Follow up with your PMD in 2 days to repeat blood work.  Secondary Diagnosis:	UTI (urinary tract infection)  Assessment and plan of treatment:	You completed antibiotics course  Secondary Diagnosis:	CLL (chronic lymphocytic leukemia)  Secondary Diagnosis:	Hypernatremia  Assessment and plan of treatment:	as above

## 2018-10-01 NOTE — DISCHARGE NOTE ADULT - PROVIDER TOKENS
FREE:[LAST:[David],FIRST:[Abril],PHONE:[(   )    -],FAX:[(   )    -],ADDRESS:[Dr. Hernandez [facility]    Internal medicine    [address] [place], [place] [id number] [phone]]]

## 2018-10-01 NOTE — PROGRESS NOTE ADULT - SUBJECTIVE AND OBJECTIVE BOX
Patient is a 89y old  Female who presents with a chief complaint of unresponsive episode (27 Sep 2018 21:19)    Vit  Vital Signs Last 24 Hrs  T(C): 36.2 (29 Sep 2018 05:03), Max: 36.6 (28 Sep 2018 20:28)  T(F): 97.2 (29 Sep 2018 05:03), Max: 97.8 (28 Sep 2018 20:28)  HR: 86 (29 Sep 2018 05:03) (64 - 86)  BP: 156/71 (29 Sep 2018 05:03) (138/59 - 156/71)  BP(mean): --  RR: 16 (29 Sep 2018 05:03) (16 - 16)  SpO2: 97% (29 Sep 2018 05:03) (97% - 100%)    PAST MEDICAL & SURGICAL HISTORY:  CHF (congestive heart failure)  UTI (urinary tract infection)  Dementia  Hypertension  Depression  HTN (hypertension)  CLL (chronic lymphocytic leukemia)  Femur fracture, right                                   13.1   25.2  )-----------( 124      ( 29 Sep 2018 06:16 )             40.0   09-29    147<H>  |  112<H>  |  35<H>  ----------------------------<  114<H>  3.9   |  30  |  1.48<H>    Ca    8.6      29 Sep 2018 06:16  Phos  1.6     09-29  Mg     1.9     09-28    TPro  5.0<L>  /  Alb  2.5<L>  /  TBili  1.2  /  DBili  x   /  AST  66<H>  /  ALT  39  /  AlkPhos  100  09-28          MEDICATIONS  (STANDING):  ascorbic acid 500 milliGRAM(s) Oral daily  cefTRIAXone   IVPB      cefTRIAXone   IVPB 1 Gram(s) IV Intermittent every 24 hours  dextrose 5% + sodium chloride 0.45%. 1000 milliLiter(s) (75 mL/Hr) IV Continuous <Continuous>  docusate sodium 100 milliGRAM(s) Oral two times a day  ferrous    sulfate 325 milliGRAM(s) Oral daily  folic acid 1 milliGRAM(s) Oral daily  heparin  Injectable 5000 Unit(s) SubCutaneous every 8 hours  melatonin 5 milliGRAM(s) Oral at bedtime  metoprolol tartrate 12.5 milliGRAM(s) Oral two times a day  mirtazapine 15 milliGRAM(s) Oral at bedtime  multivitamin 1 Tablet(s) Oral daily  potassium chloride   Powder 20 milliEquivalent(s) Oral once  senna 2 Tablet(s) Oral at bedtime    MEDICATIONS  (PRN):      REVIEW OF SYSTEMS with Home health aid at the bedside  Not obtainable.    Imaging Personally Reviewed:  [ ] YES  [ ] NO    PHYSICAL EXAM:  GENERAL: elderly female, NAD, well-groomed  HEAD: Atraumatic, Normocephalic  EYES: EOMI, conjunctiva and sclera clear  ENMT: Moist mucous membranes,   NECK: Supple  NERVOUS SYSTEM:  Alert, follows commands,   CHEST/LUNG: Clear to auscultation, no rales, no wheezing   HEART: Regular rate and rhythm; no murmurs  ABDOMEN: Soft, non-tender, non-distended  EXTREMITIES:  2+ Peripheral Pulses, No clubbing, cyanosis, or edema  SKIN: No rashes or lesions    Care Collaborated Discussed with Consultants/Other Providers [x] YES  [ ] NO
Patient is a 89y old  Female who presents with a chief complaint of unresponsive episode (27 Sep 2018 21:19)    Vital Signs Last 24 Hrs  T(F): 97.5 (28 Sep 2018 14:23), Max: 97.5 (28 Sep 2018 14:23)  HR: 64 (28 Sep 2018 14:23) (64 - 83)  BP: 147/81 (28 Sep 2018 14:23) (91/53 - 147/81)  RR: 16 (28 Sep 2018 14:23) (16 - 18)  SpO2: 100% (28 Sep 2018 14:23) (96% - 100%)    PAST MEDICAL & SURGICAL HISTORY:  CHF (congestive heart failure)  UTI (urinary tract infection)  Dementia  Hypertension  Depression  HTN (hypertension)  CLL (chronic lymphocytic leukemia)  Femur fracture, right                        11.8   17.3  )-----------( 95       ( 28 Sep 2018 06:28 )             35.1     09-28    149<H>  |  111<H>  |  41<H>  ----------------------------<  88  3.3<L>   |  30  |  1.67<H>    Ca    8.1<L>      28 Sep 2018 06:28  Phos  2.4     -28  Mg     1.9     -28    TPro  5.0<L>  /  Alb  2.5<L>  /  TBili  1.2  /  DBili  x   /  AST  66<H>  /  ALT  39  /  AlkPhos  100  09-28    PT/INR - ( 27 Sep 2018 16:29 )   PT: 12.9 sec;   INR: 1.18 ratio         PTT - ( 27 Sep 2018 16:29 )  PTT:31.8 sec  Urinalysis Basic - ( 27 Sep 2018 21:06 )    Color: Yellow / Appearance: Clear / S.015 / pH: x  Gluc: x / Ketone: Negative  / Bili: Negative / Urobili: Negative   Blood: x / Protein: 15 / Nitrite: Negative   Leuk Esterase: Moderate / RBC: 0-2 /HPF / WBC 6-10 /HPF   Sq Epi: x / Non Sq Epi: Moderate /HPF / Bacteria: Negative /HPF    Urinalysis Basic - ( 27 Sep 2018 21:06 )    Color: Yellow / Appearance: Clear / S.015 / pH: x  Gluc: x / Ketone: Negative  / Bili: Negative / Urobili: Negative   Blood: x / Protein: 15 / Nitrite: Negative   Leuk Esterase: Moderate / RBC: 0-2 /HPF / WBC 6-10 /HPF   Sq Epi: x / Non Sq Epi: Moderate /HPF / Bacteria: Negative /HPF        MEDICATIONS  (STANDING):  ascorbic acid 500 milliGRAM(s) Oral daily  cefTRIAXone   IVPB      cefTRIAXone   IVPB 1 Gram(s) IV Intermittent every 24 hours  dextrose 5% + sodium chloride 0.45%. 1000 milliLiter(s) (75 mL/Hr) IV Continuous <Continuous>  docusate sodium 100 milliGRAM(s) Oral two times a day  ferrous    sulfate 325 milliGRAM(s) Oral daily  folic acid 1 milliGRAM(s) Oral daily  heparin  Injectable 5000 Unit(s) SubCutaneous every 8 hours  melatonin 5 milliGRAM(s) Oral at bedtime  metoprolol tartrate 12.5 milliGRAM(s) Oral two times a day  mirtazapine 15 milliGRAM(s) Oral at bedtime  multivitamin 1 Tablet(s) Oral daily  potassium chloride   Powder 20 milliEquivalent(s) Oral once  senna 2 Tablet(s) Oral at bedtime    MEDICATIONS  (PRN):      REVIEW OF SYSTEMS with Home health aid at the bedside  CONSTITUTIONAL: No fever  NECK: No pain or stiffness  RESPIRATORY: No cough, no shortness of breath  CARDIOVASCULAR: No chest pain  GASTROINTESTINAL: No abdominal, no nausea, no vomiting  GENITOURINARY: no frequency  NEUROLOGICAL: no headaches  SKIN: no itching, burning, rashes, or lesions   MUSCULOSKELETAL: No joint pain or swelling  PSYCHIATRIC: No depression, anxiety, mood swings    Imaging Personally Reviewed:  [ ] YES  [ ] NO    PHYSICAL EXAM:  GENERAL: elderly female, NAD, well-groomed  HEAD: Atraumatic, Normocephalic  EYES: EOMI, conjunctiva and sclera clear  ENMT: Moist mucous membranes,   NECK: Supple  NERVOUS SYSTEM:  Alert, follows commands,   CHEST/LUNG: Clear to auscultation, no rales, no wheezing   HEART: Regular rate and rhythm; no murmurs  ABDOMEN: Soft, non-tender, non-distended  EXTREMITIES:  2+ Peripheral Pulses, No clubbing, cyanosis, or edema  SKIN: No rashes or lesions    Care Collaborated Discussed with Consultants/Other Providers [x] YES  [ ] NO
pt seen and examined.pts current chart reviewed and case discussed with NP covering    SUBJECTIVE:  pt is awake, alert , confused and not orineted x 3  pt is not in acute distress  pts aid at bedside  U/O unknown  Current meds:    ascorbic acid 500 milliGRAM(s) Oral daily  bisacodyl Suppository 10 milliGRAM(s) Rectal daily PRN  cefTRIAXone   IVPB      cefTRIAXone   IVPB 1 Gram(s) IV Intermittent every 24 hours  dextrose 5%. 1000 milliLiter(s) IV Continuous <Continuous>  docusate sodium 100 milliGRAM(s) Oral two times a day  ferrous    sulfate 325 milliGRAM(s) Oral daily  folic acid 1 milliGRAM(s) Oral daily  heparin  Injectable 5000 Unit(s) SubCutaneous every 8 hours  melatonin 5 milliGRAM(s) Oral at bedtime  metoprolol tartrate 12.5 milliGRAM(s) Oral two times a day  mirtazapine 15 milliGRAM(s) Oral at bedtime  multivitamin 1 Tablet(s) Oral daily  senna 2 Tablet(s) Oral at bedtime      Vital Signs    T(F): 97.2 (10-01-18 @ 05:12), Max: 98.4 (18 @ 14:32)  HR: 75 (10-01-18 @ 05:12) (75 - 87)  BP: 110/54 (10-01-18 @ 05:12) (104/89 - 120/97)  ABP: --  RR: 16 (10-01-18 @ 05:12) (16 - 18)  SpO2: 98% (10-01-18 @ 05:12) (97% - 98%)  Wt(kg): --  CVP(cm H2O): --  CO: --  PCWP: --    I and O's:     @ 07:01  -   @ 07:00  --------------------------------------------------------  IN:    Solution: 250 mL  Total IN: 250 mL    OUT:  Total OUT: 0 mL    Total NET: 250 mL       @ 07:01  -  10-01 @ 07:00  --------------------------------------------------------  IN:    dextrose 5%.: 50 mL  Total IN: 50 mL    OUT:  Total OUT: 0 mL    Total NET: 50 mL        Daily     Daily Weight in k.7 (01 Oct 2018 08:56)    PHYSICAL EXAM:  GENERAL: NAD, fairly nourished, well-developed  HEAD:  Atraumatic, Normocephalic  EYES: Sclera anicteric  ENMT: Dry mucous membranes  NECK: Supple, No JVD  NERVOUS SYSTEM:  awake, alert  CHEST/LUNG: Clear   HEART: Regular rate and rhythm; No murmurs  ABDOMEN: Soft, Nontender, Nondistended; Bowel sounds present  EXTREMITIES:  No edema  SKIN: Decreased turgor      LABS:    CBC:                          11.7   x     )-----------( 106      ( 01 Oct 2018 07:13 )             36.9           BMP:    10-01    142  |  111<H>  |  27<H>  ----------------------------<  106<H>  3.8   |  22  |  1.15  current egfr:40 % stage 3       150<H>  |  115<H>  |  30<H>  ----------------------------<  92  4.2   |  27  |  1.26      147<H>  |  112<H>  |  35<H>  ----------------------------<  114<H>  3.9   |  30  |  1.48<H>    Ca    7.9<L>      01 Oct 2018 07:13  Ca    8.2<L>      30 Sep 2018 06:59  Ca    8.6      29 Sep 2018 06:16  Phos  3.1     10-  Phos  1.6     -  Mg     1.9     10-01            URINE STUDIES:    Urine Microscopic-Add On (NC) (18 @ 21:06)    Comment - Urine: few amorphous urates present    Hyaline Casts: 5-10 /LPF    Granular Cast: 0-2 /LPF    Bacteria: Negative /HPF    Epithelial Cells: Moderate: mostly renal tubular epithelial cells /HPF    White Blood Cell - Urine: 6-10 /HPF    Red Blood Cell - Urine: 0-2 /HPF    Urinalysis (18 @ 21:06)    pH Urine: 5.0    Glucose Qualitative, Urine: Negative    Blood, Urine: Negative    Color: Yellow    Urine Appearance: Clear    Bilirubin: Negative    Ketone - Urine: Negative    Specific Gravity: 1.015    Protein, Urine: 15    Urobilinogen: Negative    Nitrite: Negative    Leukocyte Esterase Concentration: Moderate
Patient is a 89y old  Female who presents with a chief complaint of unresponsive episode (27 Sep 2018 21:19)    Vital Signs Last 24 Hrs  T(C): 36.2 (30 Sep 2018 05:21), Max: 37.1 (29 Sep 2018 14:00)  T(F): 97.2 (30 Sep 2018 05:21), Max: 98.7 (29 Sep 2018 14:00)  HR: 69 (30 Sep 2018 05:21) (69 - 100)  BP: 115/60 (30 Sep 2018 05:21) (109/37 - 115/87)  BP(mean): --  RR: 16 (30 Sep 2018 05:21) (16 - 17)  SpO2: 97% (30 Sep 2018 05:21) (97% - 98%)    PAST MEDICAL & SURGICAL HISTORY:  CHF (congestive heart failure)  UTI (urinary tract infection)  Dementia  Hypertension  Depression  HTN (hypertension)  CLL (chronic lymphocytic leukemia)  Femur fracture, right                                                 11.6   18.5  )-----------( 95       ( 30 Sep 2018 06:59 )             35.7   09-30    150<H>  |  115<H>  |  30<H>  ----------------------------<  92  4.2   |  27  |  1.26    Ca    8.2<L>      30 Sep 2018 06:59  Phos  1.6     09-29        MEDICATIONS  (STANDING):  ascorbic acid 500 milliGRAM(s) Oral daily  cefTRIAXone   IVPB      cefTRIAXone   IVPB 1 Gram(s) IV Intermittent every 24 hours  dextrose 5% + sodium chloride 0.45%. 1000 milliLiter(s) (75 mL/Hr) IV Continuous <Continuous>  docusate sodium 100 milliGRAM(s) Oral two times a day  ferrous    sulfate 325 milliGRAM(s) Oral daily  folic acid 1 milliGRAM(s) Oral daily  heparin  Injectable 5000 Unit(s) SubCutaneous every 8 hours  melatonin 5 milliGRAM(s) Oral at bedtime  metoprolol tartrate 12.5 milliGRAM(s) Oral two times a day  mirtazapine 15 milliGRAM(s) Oral at bedtime  multivitamin 1 Tablet(s) Oral daily  potassium chloride   Powder 20 milliEquivalent(s) Oral once  senna 2 Tablet(s) Oral at bedtime    MEDICATIONS  (PRN):      REVIEW OF SYSTEMS with Home health aid at the bedside  Not obtainable.    Imaging Personally Reviewed:  [ ] YES  [ ] NO    PHYSICAL EXAM:  GENERAL: elderly female, NAD, well-groomed  HEAD: Atraumatic, Normocephalic  EYES: EOMI, conjunctiva and sclera clear  ENMT: Moist mucous membranes,   NECK: Supple  NERVOUS SYSTEM:  Alert, follows commands,   CHEST/LUNG: Clear to auscultation, no rales, no wheezing   HEART: Regular rate and rhythm; no murmurs  ABDOMEN: Soft, non-tender, non-distended  EXTREMITIES:  2+ Peripheral Pulses, No clubbing, cyanosis, or edema  SKIN: No rashes or lesions    Care Collaborated Discussed with Consultants/Other Providers [x] YES  [ ] NO

## 2018-10-01 NOTE — DISCHARGE NOTE ADULT - SECONDARY DIAGNOSIS.
Electrolyte disturbance UTI (urinary tract infection) CLL (chronic lymphocytic leukemia) Hypernatremia

## 2018-10-01 NOTE — DISCHARGE NOTE ADULT - MEDICATION SUMMARY - MEDICATIONS TO STOP TAKING
I will STOP taking the medications listed below when I get home from the hospital:    acetaminophen-oxycodone 325 mg-5 mg oral tablet  -- 1 tab(s) by mouth every 4 hours, As needed, Moderate Pain (4 - 6)    furosemide 40 mg oral tablet  -- 1 tab(s) by mouth 2 times a day

## 2018-10-01 NOTE — DISCHARGE NOTE ADULT - PLAN OF CARE
prevent further injury You were treated for acute kidney injury, now much improved. Follow up with your PMD in 2 days to repeat blood work to follow up kidney function Your sodium levels were elevated, now returned to normal. Follow up with your PMD in 2 days to repeat blood work. You completed antibiotics course as above

## 2018-10-01 NOTE — PROGRESS NOTE ADULT - ASSESSMENT
A/P:  1. DAMON secondary to volume depletion, now improved to her baseline as per old labs  -Keep patient euvolemic and renal diet  -Avoid Nephrotoxic Meds/ Agents such as (NSAIDs, IV contrast, Aminoglycosides such as gentamicin, -Gadolinium contrast, Phosphate containing enemas, etc..)  -Adjust Medications according to eGFR  -f/u bmp in 3 days at PCP office or NH   2. Hypernatremia due to water deficit, now improved , secondary to poor po intake plus diuretics induced  -continue free water q 8hrs to replace insensible losses daily  -agree with low dose Lasix 20mg daily or 40mg q other day as per pts pcp   -f/u Na level in 3 days and the as needed as per pts clinical status   3. Hx of CHF:continue Lasix   4. Anemia: plan as per medical team A/P:  1. DAMON secondary to volume depletion, now improved to her baseline as per old labs  -Keep patient euvolemic and renal diet  -Avoid Nephrotoxic Meds/ Agents such as (NSAIDs, IV contrast, Aminoglycosides such as gentamicin, -Gadolinium contrast, Phosphate containing enemas, etc..)  -Adjust Medications according to eGFR  -f/u bmp in 3 days at PCP office or NH   2. Hypernatremia due to water deficit, now improved , secondary to poor po intake plus diuretics induced  -continue free water q 8hrs to replace insensible losses daily  -agree with low dose Lasix 20mg daily or 40mg q other day as per pts pcp   -f/u Na level in 3 days and the as needed as per pts clinical status   3. Hx of CHF:continue Lasix   4. Anemia: plan as per medical team  5.CKD: stage 3 , with mild or minimal proteinuria ,r/o secondary to ischemic renal ds  -pts current egfr is around her baseline  -[facility]  -Keep patient euvolemic and renal diet  -Avoid Nephrotoxic Meds/ Agents such as (NSAIDs, IV contrast, Aminoglycosides such as gentamicin, -Gadolinium contrast, Phosphate containing enemas, etc..)  -Adjust Medications according to eGFR

## 2018-10-01 NOTE — DISCHARGE NOTE ADULT - HOSPITAL COURSE
89 year old female ambulates w/ walker and wheelchair w/ PMH of Dementia AAOx1-2, CLL (diagnosed 8 years ago, never treated), Depression, and CHF brought in by ambulance and HHA for an episode of unresponsiveness w/ drooling - admitting for further evaluation w/ differentials including Lasix induced Hypovolemia UTI, and unlikely Seizure 89 year old female ambulates w/ walker and wheelchair w/ PMH of Dementia AAOx1-2, CLL (diagnosed 8 years ago, never treated), Depression, and CHF brought in by ambulance and HHA for an episode of unresponsiveness w/ drooling - admitting for further evaluation w/ differentials including Lasix induced Hypovolemia UTI, and unlikely Seizure    Unresponsive episode Likely 2/2 dehydration versus UTI  - Afebrile, WBC near baseline although % PMNs doubled, and elevated Lactate (likely 2/2 hypovolemic)  - CT head negative  - CXR clear and markedly improved; no respiratory distress  - Started Rocephin for UTI, Ucx negative, completed 5 days course    Hypernatremia 2/2 dehydration  s/p IVF hydration, now resolved    DAMON (acute kidney injury) on CKD-3; Prerenal, likely 2/2 overdiuresis  -held lasix  - s/p IV hydration  - nephrology following, creatinine returning to normal 1.15    CHF (congestive heart failure) Hx of clinical CHF; not in exacerbation  -  TTE WNLs, normal EF and G1DD  - CXR clear  - Held Lasix 2/2 damon/dehydration,  Metoprolol at half dose  - No ACEi 2/2 DAMON.    CLL (chronic lymphocytic leukemia).    -att baseline; never treated, no further intervention.     Depression  c/w Mitrazipine    Patient is medically stable for discharge home with 24/7 HHA, and follow up with PMD in 2 days for ongoing follow up of kidney function and electrolytes.

## 2018-11-11 PROCEDURE — 85730 THROMBOPLASTIN TIME PARTIAL: CPT

## 2018-11-11 PROCEDURE — 85610 PROTHROMBIN TIME: CPT

## 2018-11-11 PROCEDURE — 99285 EMERGENCY DEPT VISIT HI MDM: CPT | Mod: 25

## 2018-11-11 PROCEDURE — 70450 CT HEAD/BRAIN W/O DYE: CPT

## 2018-11-11 PROCEDURE — 71045 X-RAY EXAM CHEST 1 VIEW: CPT

## 2018-11-11 PROCEDURE — 80048 BASIC METABOLIC PNL TOTAL CA: CPT

## 2018-11-11 PROCEDURE — 93005 ELECTROCARDIOGRAM TRACING: CPT

## 2018-11-11 PROCEDURE — 81001 URINALYSIS AUTO W/SCOPE: CPT

## 2018-11-11 PROCEDURE — 87086 URINE CULTURE/COLONY COUNT: CPT

## 2018-11-11 PROCEDURE — 82550 ASSAY OF CK (CPK): CPT

## 2018-11-11 PROCEDURE — 84484 ASSAY OF TROPONIN QUANT: CPT

## 2018-11-11 PROCEDURE — 83605 ASSAY OF LACTIC ACID: CPT

## 2018-11-11 PROCEDURE — 84100 ASSAY OF PHOSPHORUS: CPT

## 2018-11-11 PROCEDURE — 80053 COMPREHEN METABOLIC PANEL: CPT

## 2018-11-11 PROCEDURE — 92610 EVALUATE SWALLOWING FUNCTION: CPT

## 2018-11-11 PROCEDURE — 85027 COMPLETE CBC AUTOMATED: CPT

## 2018-11-11 PROCEDURE — 83735 ASSAY OF MAGNESIUM: CPT

## 2019-03-07 NOTE — DISCHARGE NOTE ADULT - CLICK TO LAUNCH ORM
How Many Skin Cancers Have You Had?: one What Is The Reason For Today's Visit?: Follow Up Non-Melanoma Skin Cancer .

## 2019-05-16 ENCOUNTER — INPATIENT (INPATIENT)
Facility: HOSPITAL | Age: 84
LOS: 7 days | Discharge: HOSPICE | DRG: 640 | End: 2019-05-24
Attending: INTERNAL MEDICINE | Admitting: INTERNAL MEDICINE
Payer: MEDICARE

## 2019-05-16 VITALS
DIASTOLIC BLOOD PRESSURE: 101 MMHG | WEIGHT: 130.07 LBS | OXYGEN SATURATION: 98 % | RESPIRATION RATE: 20 BRPM | HEIGHT: 55 IN | SYSTOLIC BLOOD PRESSURE: 133 MMHG | HEART RATE: 65 BPM

## 2019-05-16 DIAGNOSIS — E86.0 DEHYDRATION: ICD-10-CM

## 2019-05-16 PROBLEM — I50.9 HEART FAILURE, UNSPECIFIED: Chronic | Status: ACTIVE | Noted: 2018-09-27

## 2019-05-16 LAB
ACETONE SERPL-MCNC: NEGATIVE — SIGNIFICANT CHANGE UP
ALBUMIN SERPL ELPH-MCNC: 2.6 G/DL — LOW (ref 3.5–5)
ALP SERPL-CCNC: 147 U/L — HIGH (ref 40–120)
ALT FLD-CCNC: 35 U/L DA — SIGNIFICANT CHANGE UP (ref 10–60)
ANION GAP SERPL CALC-SCNC: 8 MMOL/L — SIGNIFICANT CHANGE UP (ref 5–17)
ANISOCYTOSIS BLD QL: SLIGHT — SIGNIFICANT CHANGE UP
APTT BLD: 32.9 SEC — SIGNIFICANT CHANGE UP (ref 27.5–36.3)
AST SERPL-CCNC: 55 U/L — HIGH (ref 10–40)
BASOPHILS # BLD AUTO: 0 K/UL — SIGNIFICANT CHANGE UP (ref 0–0.2)
BASOPHILS NFR BLD AUTO: 0 % — SIGNIFICANT CHANGE UP (ref 0–2)
BILIRUB SERPL-MCNC: 0.7 MG/DL — SIGNIFICANT CHANGE UP (ref 0.2–1.2)
BUN SERPL-MCNC: 63 MG/DL — HIGH (ref 7–18)
CALCIUM SERPL-MCNC: 8.5 MG/DL — SIGNIFICANT CHANGE UP (ref 8.4–10.5)
CHLORIDE SERPL-SCNC: 150 MMOL/L — HIGH (ref 96–108)
CK SERPL-CCNC: 111 U/L — SIGNIFICANT CHANGE UP (ref 21–215)
CO2 SERPL-SCNC: 26 MMOL/L — SIGNIFICANT CHANGE UP (ref 22–31)
CREAT SERPL-MCNC: 3.01 MG/DL — HIGH (ref 0.5–1.3)
ELLIPTOCYTES BLD QL SMEAR: SLIGHT — SIGNIFICANT CHANGE UP
EOSINOPHIL # BLD AUTO: 0 K/UL — SIGNIFICANT CHANGE UP (ref 0–0.5)
EOSINOPHIL NFR BLD AUTO: 0 % — SIGNIFICANT CHANGE UP (ref 0–6)
GLUCOSE SERPL-MCNC: 214 MG/DL — HIGH (ref 70–99)
HCT VFR BLD CALC: 42.2 % — SIGNIFICANT CHANGE UP (ref 34.5–45)
HGB BLD-MCNC: 12.4 G/DL — SIGNIFICANT CHANGE UP (ref 11.5–15.5)
HYPOCHROMIA BLD QL: SLIGHT — SIGNIFICANT CHANGE UP
INR BLD: 1.17 RATIO — HIGH (ref 0.88–1.16)
LACTATE SERPL-SCNC: 4.7 MMOL/L — CRITICAL HIGH (ref 0.7–2)
LYMPHOCYTES # BLD AUTO: 20.5 K/UL — HIGH (ref 1–3.3)
LYMPHOCYTES # BLD AUTO: 70 % — HIGH (ref 13–44)
MAGNESIUM SERPL-MCNC: 3.1 MG/DL — HIGH (ref 1.6–2.6)
MANUAL SMEAR VERIFICATION: SIGNIFICANT CHANGE UP
MCHC RBC-ENTMCNC: 29.4 GM/DL — LOW (ref 32–36)
MCHC RBC-ENTMCNC: 35.2 PG — HIGH (ref 27–34)
MCV RBC AUTO: 119.9 FL — HIGH (ref 80–100)
MICROCYTES BLD QL: SLIGHT — SIGNIFICANT CHANGE UP
MONOCYTES # BLD AUTO: 1.46 K/UL — HIGH (ref 0–0.9)
MONOCYTES NFR BLD AUTO: 5 % — SIGNIFICANT CHANGE UP (ref 2–14)
NEUTROPHILS # BLD AUTO: 7.32 K/UL — SIGNIFICANT CHANGE UP (ref 1.8–7.4)
NEUTROPHILS NFR BLD AUTO: 25 % — LOW (ref 43–77)
NRBC # BLD: 0 /100 — SIGNIFICANT CHANGE UP (ref 0–0)
NT-PROBNP SERPL-SCNC: 750 PG/ML — HIGH (ref 0–450)
PLAT MORPH BLD: NORMAL — SIGNIFICANT CHANGE UP
PLATELET # BLD AUTO: 64 K/UL — LOW (ref 150–400)
POIKILOCYTOSIS BLD QL AUTO: SLIGHT — SIGNIFICANT CHANGE UP
POTASSIUM SERPL-MCNC: 4.5 MMOL/L — SIGNIFICANT CHANGE UP (ref 3.5–5.3)
POTASSIUM SERPL-SCNC: 4.5 MMOL/L — SIGNIFICANT CHANGE UP (ref 3.5–5.3)
PROT SERPL-MCNC: 5.4 G/DL — LOW (ref 6–8.3)
PROTHROM AB SERPL-ACNC: 13 SEC — HIGH (ref 10–12.9)
RBC # BLD: 3.52 M/UL — LOW (ref 3.8–5.2)
RBC # FLD: 15.6 % — HIGH (ref 10.3–14.5)
RBC BLD AUTO: ABNORMAL
SMUDGE CELLS # BLD: PRESENT — SIGNIFICANT CHANGE UP
SODIUM SERPL-SCNC: 184 MMOL/L — CRITICAL HIGH (ref 135–145)
SPHEROCYTES BLD QL SMEAR: SLIGHT — SIGNIFICANT CHANGE UP
WBC # BLD: 29.28 K/UL — HIGH (ref 3.8–10.5)
WBC # FLD AUTO: 29.28 K/UL — HIGH (ref 3.8–10.5)

## 2019-05-16 PROCEDURE — 71045 X-RAY EXAM CHEST 1 VIEW: CPT | Mod: 26

## 2019-05-16 PROCEDURE — 70450 CT HEAD/BRAIN W/O DYE: CPT | Mod: 26

## 2019-05-16 PROCEDURE — 99291 CRITICAL CARE FIRST HOUR: CPT

## 2019-05-16 PROCEDURE — 71250 CT THORAX DX C-: CPT | Mod: 26

## 2019-05-16 RX ORDER — SODIUM CHLORIDE 9 MG/ML
1800 INJECTION INTRAMUSCULAR; INTRAVENOUS; SUBCUTANEOUS ONCE
Refills: 0 | Status: COMPLETED | OUTPATIENT
Start: 2019-05-16 | End: 2019-05-16

## 2019-05-16 RX ORDER — HALOPERIDOL DECANOATE 100 MG/ML
2 INJECTION INTRAMUSCULAR ONCE
Refills: 0 | Status: COMPLETED | OUTPATIENT
Start: 2019-05-16 | End: 2019-05-16

## 2019-05-16 RX ORDER — SODIUM CHLORIDE 9 MG/ML
1000 INJECTION INTRAMUSCULAR; INTRAVENOUS; SUBCUTANEOUS
Refills: 0 | Status: DISCONTINUED | OUTPATIENT
Start: 2019-05-16 | End: 2019-05-17

## 2019-05-16 RX ORDER — PIPERACILLIN AND TAZOBACTAM 4; .5 G/20ML; G/20ML
3.38 INJECTION, POWDER, LYOPHILIZED, FOR SOLUTION INTRAVENOUS ONCE
Refills: 0 | Status: COMPLETED | OUTPATIENT
Start: 2019-05-16 | End: 2019-05-16

## 2019-05-16 RX ADMIN — PIPERACILLIN AND TAZOBACTAM 200 GRAM(S): 4; .5 INJECTION, POWDER, LYOPHILIZED, FOR SOLUTION INTRAVENOUS at 23:19

## 2019-05-16 RX ADMIN — Medication 0.5 MILLIGRAM(S): at 17:07

## 2019-05-16 RX ADMIN — SODIUM CHLORIDE 1800 MILLILITER(S): 9 INJECTION INTRAMUSCULAR; INTRAVENOUS; SUBCUTANEOUS at 18:51

## 2019-05-16 RX ADMIN — HALOPERIDOL DECANOATE 2 MILLIGRAM(S): 100 INJECTION INTRAMUSCULAR at 17:06

## 2019-05-16 NOTE — ED PROVIDER NOTE - CRITICAL CARE PROVIDED
interpretation of diagnostic studies/consultation with other physicians/additional history taking/direct patient care (not related to procedure)/documentation/consult w/ pt's family directly relating to pts condition/conducted a detailed discussion of DNR status

## 2019-05-16 NOTE — ED ADULT TRIAGE NOTE - CADM TRG TX PRIOR TO ARRIVAL
Duration Of Freeze Thaw-Cycle (Seconds): 0 Detail Level: Detailed Render Post-Care Instructions In Note?: no Consent: The patient's consent was obtained including but not limited to risks of crusting, scabbing, blistering, scarring, darker or lighter pigmentary change, recurrence, incomplete removal and infection. Post-Care Instructions: I reviewed with the patient in detail post-care instructions. Patient is to wear sunprotection, and avoid picking at any of the treated lesions. Pt may apply Vaseline to crusted or scabbing areas. none

## 2019-05-16 NOTE — ED ADULT NURSE NOTE - NSIMPLEMENTINTERV_GEN_ALL_ED
Implemented All Fall with Harm Risk Interventions:  [place] to call system. Call bell, personal items and telephone within reach. Instruct patient to call for assistance. Room bathroom lighting operational. Non-slip footwear when patient is off stretcher. Physically safe environment: no spills, clutter or unnecessary equipment. Stretcher in lowest position, wheels locked, appropriate side rails in place. Provide visual cue, wrist band, yellow gown, etc. Monitor gait and stability. Monitor for mental status changes and reorient to person, place, and time. Review medications for side effects contributing to fall risk. Reinforce activity limits and safety measures with patient and family. Provide visual clues: red socks.

## 2019-05-16 NOTE — ED PROVIDER NOTE - OBJECTIVE STATEMENT
89 y/o F pt with a PMHx of CHF, CLL, dementia, depression, HTN, UTI, BIB daughter to the ED for weakness and jerking of hands and neck today without foaming at the mouth. Family reports patient is agitated bitting her lips and gums x2 days and lacks sleep. Daughter states patient walks with a walker and has loss weight and patient only sometimes recognizes her. Family denies urinary symptoms, fever or any other complaints. NKDA.

## 2019-05-16 NOTE — ED PROVIDER NOTE - PROGRESS NOTE DETAILS
pt with very elevated Na, dehydrated, renal insuff., d/w Dr. Galvan, will admit.  case d/w daughter, pt is DNR/DNI, no invasive procedures

## 2019-05-16 NOTE — ED PROVIDER NOTE - CARE PLAN
Principal Discharge DX:	Severe dehydration  Secondary Diagnosis:	Hypernatremia  Secondary Diagnosis:	Elevated lactic acid level  Secondary Diagnosis:	Renal insufficiency  Secondary Diagnosis:	Cystitis

## 2019-05-16 NOTE — ED PROVIDER NOTE - CLINICAL SUMMARY MEDICAL DECISION MAKING FREE TEXT BOX
pt with worsening mental status, concern for metabolic imbalance vs infectious process, pt also appears to be very dehydrated, will get labs, CT head

## 2019-05-17 DIAGNOSIS — R74.0 NONSPECIFIC ELEVATION OF LEVELS OF TRANSAMINASE AND LACTIC ACID DEHYDROGENASE [LDH]: ICD-10-CM

## 2019-05-17 DIAGNOSIS — R79.89 OTHER SPECIFIED ABNORMAL FINDINGS OF BLOOD CHEMISTRY: ICD-10-CM

## 2019-05-17 DIAGNOSIS — N28.9 DISORDER OF KIDNEY AND URETER, UNSPECIFIED: ICD-10-CM

## 2019-05-17 DIAGNOSIS — F32.9 MAJOR DEPRESSIVE DISORDER, SINGLE EPISODE, UNSPECIFIED: ICD-10-CM

## 2019-05-17 DIAGNOSIS — E87.0 HYPEROSMOLALITY AND HYPERNATREMIA: ICD-10-CM

## 2019-05-17 DIAGNOSIS — I10 ESSENTIAL (PRIMARY) HYPERTENSION: ICD-10-CM

## 2019-05-17 DIAGNOSIS — G93.40 ENCEPHALOPATHY, UNSPECIFIED: ICD-10-CM

## 2019-05-17 DIAGNOSIS — Z29.9 ENCOUNTER FOR PROPHYLACTIC MEASURES, UNSPECIFIED: ICD-10-CM

## 2019-05-17 LAB
ALBUMIN SERPL ELPH-MCNC: 2.3 G/DL — LOW (ref 3.5–5)
ALP SERPL-CCNC: 125 U/L — HIGH (ref 40–120)
ALT FLD-CCNC: 31 U/L DA — SIGNIFICANT CHANGE UP (ref 10–60)
ANION GAP SERPL CALC-SCNC: 3 MMOL/L — LOW (ref 5–17)
ANION GAP SERPL CALC-SCNC: 3 MMOL/L — LOW (ref 5–17)
ANION GAP SERPL CALC-SCNC: 5 MMOL/L — SIGNIFICANT CHANGE UP (ref 5–17)
ANION GAP SERPL CALC-SCNC: 6 MMOL/L — SIGNIFICANT CHANGE UP (ref 5–17)
APPEARANCE UR: ABNORMAL
AST SERPL-CCNC: 55 U/L — HIGH (ref 10–40)
BACTERIA # UR AUTO: ABNORMAL /HPF
BASOPHILS # BLD AUTO: 0.07 K/UL — SIGNIFICANT CHANGE UP (ref 0–0.2)
BASOPHILS NFR BLD AUTO: 0.3 % — SIGNIFICANT CHANGE UP (ref 0–2)
BILIRUB SERPL-MCNC: 0.8 MG/DL — SIGNIFICANT CHANGE UP (ref 0.2–1.2)
BILIRUB UR-MCNC: NEGATIVE — SIGNIFICANT CHANGE UP
BUN SERPL-MCNC: 47 MG/DL — HIGH (ref 7–18)
BUN SERPL-MCNC: 50 MG/DL — HIGH (ref 7–18)
BUN SERPL-MCNC: 51 MG/DL — HIGH (ref 7–18)
BUN SERPL-MCNC: 51 MG/DL — HIGH (ref 7–18)
CALCIUM SERPL-MCNC: 7.5 MG/DL — LOW (ref 8.4–10.5)
CALCIUM SERPL-MCNC: 7.7 MG/DL — LOW (ref 8.4–10.5)
CALCIUM SERPL-MCNC: 7.8 MG/DL — LOW (ref 8.4–10.5)
CALCIUM SERPL-MCNC: 7.9 MG/DL — LOW (ref 8.4–10.5)
CHLORIDE SERPL-SCNC: 144 MMOL/L — HIGH (ref 96–108)
CHLORIDE SERPL-SCNC: 148 MMOL/L — HIGH (ref 96–108)
CHLORIDE SERPL-SCNC: 149 MMOL/L — HIGH (ref 96–108)
CHLORIDE SERPL-SCNC: 150 MMOL/L — HIGH (ref 96–108)
CO2 SERPL-SCNC: 24 MMOL/L — SIGNIFICANT CHANGE UP (ref 22–31)
COLOR SPEC: YELLOW — SIGNIFICANT CHANGE UP
CREAT ?TM UR-MCNC: 49 MG/DL — SIGNIFICANT CHANGE UP
CREAT SERPL-MCNC: 2.41 MG/DL — HIGH (ref 0.5–1.3)
CREAT SERPL-MCNC: 2.54 MG/DL — HIGH (ref 0.5–1.3)
CREAT SERPL-MCNC: 2.56 MG/DL — HIGH (ref 0.5–1.3)
CREAT SERPL-MCNC: 2.57 MG/DL — HIGH (ref 0.5–1.3)
DIFF PNL FLD: ABNORMAL
EOSINOPHIL # BLD AUTO: 0 K/UL — SIGNIFICANT CHANGE UP (ref 0–0.5)
EOSINOPHIL NFR BLD AUTO: 0 % — SIGNIFICANT CHANGE UP (ref 0–6)
EPI CELLS # UR: ABNORMAL /HPF
GLUCOSE SERPL-MCNC: 110 MG/DL — HIGH (ref 70–99)
GLUCOSE SERPL-MCNC: 114 MG/DL — HIGH (ref 70–99)
GLUCOSE SERPL-MCNC: 119 MG/DL — HIGH (ref 70–99)
GLUCOSE SERPL-MCNC: 90 MG/DL — SIGNIFICANT CHANGE UP (ref 70–99)
GLUCOSE UR QL: NEGATIVE — SIGNIFICANT CHANGE UP
HCT VFR BLD CALC: 37.9 % — SIGNIFICANT CHANGE UP (ref 34.5–45)
HGB BLD-MCNC: 11.1 G/DL — LOW (ref 11.5–15.5)
IMM GRANULOCYTES NFR BLD AUTO: 0.2 % — SIGNIFICANT CHANGE UP (ref 0–1.5)
KETONES UR-MCNC: NEGATIVE — SIGNIFICANT CHANGE UP
LACTATE SERPL-SCNC: 1.4 MMOL/L — SIGNIFICANT CHANGE UP (ref 0.7–2)
LEUKOCYTE ESTERASE UR-ACNC: ABNORMAL
LYMPHOCYTES # BLD AUTO: 15.76 K/UL — HIGH (ref 1–3.3)
LYMPHOCYTES # BLD AUTO: 73.4 % — HIGH (ref 13–44)
MAGNESIUM SERPL-MCNC: 2.7 MG/DL — HIGH (ref 1.6–2.6)
MAGNESIUM UR-MCNC: 5.1 MG/DL — SIGNIFICANT CHANGE UP
MCHC RBC-ENTMCNC: 29.3 GM/DL — LOW (ref 32–36)
MCHC RBC-ENTMCNC: 34.6 PG — HIGH (ref 27–34)
MCV RBC AUTO: 118.1 FL — HIGH (ref 80–100)
MONOCYTES # BLD AUTO: 0.7 K/UL — SIGNIFICANT CHANGE UP (ref 0–0.9)
MONOCYTES NFR BLD AUTO: 3.3 % — SIGNIFICANT CHANGE UP (ref 2–14)
NEUTROPHILS # BLD AUTO: 4.9 K/UL — SIGNIFICANT CHANGE UP (ref 1.8–7.4)
NEUTROPHILS NFR BLD AUTO: 22.8 % — LOW (ref 43–77)
NITRITE UR-MCNC: NEGATIVE — SIGNIFICANT CHANGE UP
NRBC # BLD: 0 /100 WBCS — SIGNIFICANT CHANGE UP (ref 0–0)
OSMOLALITY UR: 579 MOSM/KG — SIGNIFICANT CHANGE UP (ref 50–1200)
PH UR: 6 — SIGNIFICANT CHANGE UP (ref 5–8)
PHOSPHATE 24H UR-MCNC: 26.1 MG/DL — SIGNIFICANT CHANGE UP
PHOSPHATE SERPL-MCNC: 2.2 MG/DL — LOW (ref 2.5–4.5)
PLATELET # BLD AUTO: 47 K/UL — LOW (ref 150–400)
POTASSIUM SERPL-MCNC: 3.6 MMOL/L — SIGNIFICANT CHANGE UP (ref 3.5–5.3)
POTASSIUM SERPL-MCNC: 3.7 MMOL/L — SIGNIFICANT CHANGE UP (ref 3.5–5.3)
POTASSIUM SERPL-MCNC: 3.9 MMOL/L — SIGNIFICANT CHANGE UP (ref 3.5–5.3)
POTASSIUM SERPL-MCNC: 4.1 MMOL/L — SIGNIFICANT CHANGE UP (ref 3.5–5.3)
POTASSIUM SERPL-SCNC: 3.6 MMOL/L — SIGNIFICANT CHANGE UP (ref 3.5–5.3)
POTASSIUM SERPL-SCNC: 3.7 MMOL/L — SIGNIFICANT CHANGE UP (ref 3.5–5.3)
POTASSIUM SERPL-SCNC: 3.9 MMOL/L — SIGNIFICANT CHANGE UP (ref 3.5–5.3)
POTASSIUM SERPL-SCNC: 4.1 MMOL/L — SIGNIFICANT CHANGE UP (ref 3.5–5.3)
POTASSIUM UR-SCNC: 57 MMOL/L — SIGNIFICANT CHANGE UP (ref 25–125)
PROT ?TM UR-MCNC: 24 MG/DL — HIGH (ref 0–12)
PROT SERPL-MCNC: 4.7 G/DL — LOW (ref 6–8.3)
PROT UR-MCNC: 15
RBC # BLD: 3.21 M/UL — LOW (ref 3.8–5.2)
RBC # FLD: 15.4 % — HIGH (ref 10.3–14.5)
RBC CASTS # UR COMP ASSIST: ABNORMAL /HPF (ref 0–2)
SODIUM SERPL-SCNC: 174 MMOL/L — CRITICAL HIGH (ref 135–145)
SODIUM SERPL-SCNC: 176 MMOL/L — CRITICAL HIGH (ref 135–145)
SODIUM SERPL-SCNC: 177 MMOL/L — CRITICAL HIGH (ref 135–145)
SODIUM SERPL-SCNC: 177 MMOL/L — CRITICAL HIGH (ref 135–145)
SODIUM UR-SCNC: 131 MMOL/L — SIGNIFICANT CHANGE UP (ref 40–220)
SP GR SPEC: 1.01 — SIGNIFICANT CHANGE UP (ref 1.01–1.02)
TSH SERPL-MCNC: 2.99 UU/ML — SIGNIFICANT CHANGE UP (ref 0.34–4.82)
URATE UR-MCNC: 72.2 MG/DL — SIGNIFICANT CHANGE UP
UROBILINOGEN FLD QL: NEGATIVE — SIGNIFICANT CHANGE UP
UUN UR-MCNC: 561 MG/DL — SIGNIFICANT CHANGE UP
WBC # BLD: 21.47 K/UL — HIGH (ref 3.8–10.5)
WBC # FLD AUTO: 21.47 K/UL — HIGH (ref 3.8–10.5)
WBC UR QL: ABNORMAL /HPF (ref 0–5)

## 2019-05-17 RX ORDER — HEPARIN SODIUM 5000 [USP'U]/ML
5000 INJECTION INTRAVENOUS; SUBCUTANEOUS EVERY 8 HOURS
Refills: 0 | Status: DISCONTINUED | OUTPATIENT
Start: 2019-05-17 | End: 2019-05-17

## 2019-05-17 RX ORDER — HEPARIN SODIUM 5000 [USP'U]/ML
5000 INJECTION INTRAVENOUS; SUBCUTANEOUS EVERY 12 HOURS
Refills: 0 | Status: DISCONTINUED | OUTPATIENT
Start: 2019-05-17 | End: 2019-05-24

## 2019-05-17 RX ORDER — SODIUM CHLORIDE 9 MG/ML
1000 INJECTION, SOLUTION INTRAVENOUS
Refills: 0 | Status: DISCONTINUED | OUTPATIENT
Start: 2019-05-17 | End: 2019-05-17

## 2019-05-17 RX ORDER — SODIUM CHLORIDE 9 MG/ML
1000 INJECTION, SOLUTION INTRAVENOUS
Refills: 0 | Status: DISCONTINUED | OUTPATIENT
Start: 2019-05-17 | End: 2019-05-18

## 2019-05-17 RX ORDER — MIRTAZAPINE 45 MG/1
15 TABLET, ORALLY DISINTEGRATING ORAL AT BEDTIME
Refills: 0 | Status: DISCONTINUED | OUTPATIENT
Start: 2019-05-17 | End: 2019-05-24

## 2019-05-17 RX ADMIN — SODIUM CHLORIDE 100 MILLILITER(S): 9 INJECTION, SOLUTION INTRAVENOUS at 17:21

## 2019-05-17 RX ADMIN — SODIUM CHLORIDE 100 MILLILITER(S): 9 INJECTION, SOLUTION INTRAVENOUS at 11:42

## 2019-05-17 RX ADMIN — HEPARIN SODIUM 5000 UNIT(S): 5000 INJECTION INTRAVENOUS; SUBCUTANEOUS at 17:16

## 2019-05-17 RX ADMIN — HEPARIN SODIUM 5000 UNIT(S): 5000 INJECTION INTRAVENOUS; SUBCUTANEOUS at 06:06

## 2019-05-17 NOTE — CONSULT NOTE ADULT - ASSESSMENT
Hypernatremia due to diuretics and reduced po  fluid intake  DAMON due to the above pre renal and non oliguric.    Continue hypotonic solution D5W, AT A RATE OF 80 ML/HOUR, ALLOWED 12 MEQ CHANGE IN 24 HOURS.  Follow sodium q 8 hours and adjust fluid intake as need    Follow po4 in am.  Follow magnesium level in am.    Follow cbc  in am

## 2019-05-17 NOTE — H&P ADULT - ASSESSMENT
89/F from home with 24*7 HHA with PMH of CHF, CLL, dementia, depression, HTN, UTI, BIB daughter to the ED for weakness and jerking of hands and neck today without foaming at the mouth. HHA at bedside reports that for last 3 days she was not able to sleep feeling more agitated biting her self and was more aggressive then her baseline. Today HHA noted her to have jerking movement of her arms and had so she was  brought to hospital. Patient was awake during episode, no foaming, tongue bite, urinary or bladder incontinence noted during episode. HHA also reports that she was eating, drinking. Reports being on sodium supplement. Denies nausea, vomiting, diarrhea, abdominal pain, SOB, chest pain, HOLLOWAY, palpitations, dizziness, headache, cough, wheezing, joint pain or swelling, fever, chills.     ED course:  Vitals: 133/101-> 89/48, 56, 20 (98)  Labs pertinent for WBC of 29.28  Na level of  184, Cl of 150  BUN/Cr of 63/3.01 (baseline of 27/1.15)  ProBNP of 750  lactate level of 4.7->   S/p 89/F from home with 24*7 HHA with PMH of CHF, CLL, dementia, depression, HTN, UTI, BIB daughter to the ED for weakness and jerking of hands and neck today without foaming at the mouth. HHA at bedside reports that for last 3 days she was not able to sleep feeling more agitated biting her self and was more aggressive then her baseline. Today HHA noted her to have jerking movement of her arms and had so she was  brought to hospital. Patient was awake during episode, no foaming, tongue bite, urinary or bladder incontinence noted during episode. HHA also reports that she was eating, drinking. Reports being on sodium supplement. Denies nausea, vomiting, diarrhea, abdominal pain, SOB, chest pain, HOLLOWAY, palpitations, dizziness, headache, cough, wheezing, joint pain or swelling, fever, chills.     ED course:  Vitals: 133/101-> 89/48, 56, 20 (98)  Labs pertinent for WBC of 29.28  Na level of  184, Cl of 150  BUN/Cr of 63/3.01 (baseline of 27/1.15)  ProBNP of 750  lactate level of 4.7-> 1.4  chest xray: left blunting   S/p zosyn and levoflox was given  NS 1800 cc bolus was given and started on NS@125  ativan and haldol was given to calm patient

## 2019-05-17 NOTE — H&P ADULT - NSHPLABSRESULTS_GEN_ALL_CORE
Vital Signs Last 24 Hrs  T(C): --  T(F): --  HR: 56 (16 May 2019 21:45) (56 - 68)  BP: 89/48 (16 May 2019 21:45) (89/48 - 147/90)  BP(mean): --  RR: 20 (16 May 2019 21:45) (20 - 20)  SpO2: 98% (16 May 2019 21:45) (98% - 98%)                            12.4   29.28 )-----------( 64       ( 16 May 2019 16:36 )             42.2       05-16    184<HH>  |  150<H>  |  63<H>  ----------------------------<  214<H>  4.5   |  26  |  3.01<H>    Ca    8.5      16 May 2019 16:36  Mg     3.1     05-16    TPro  5.4<L>  /  Alb  2.6<L>  /  TBili  0.7  /  DBili  x   /  AST  55<H>  /  ALT  35  /  AlkPhos  147<H>  05-16                  PT/INR - ( 16 May 2019 16:36 )   PT: 13.0 sec;   INR: 1.17 ratio         PTT - ( 16 May 2019 16:36 )  PTT:32.9 sec    Lactate Trend  05-17 @ 00:02 Lactate:1.4   05-16 @ 16:36 Lactate:4.7       CARDIAC MARKERS ( 16 May 2019 16:36 )  x     / x     / 111 U/L / x     / x            CAPILLARY BLOOD GLUCOSE      POCT Blood Glucose.: 206 mg/dL (16 May 2019 14:47)        < from: CT Head No Cont (05.16.19 @ 16:47) >    Impression:    No intracranial hemorrhage or shift.      < end of copied text >    < from: Xray Chest 1 View AP/PA (05.16.19 @ 15:21) >    FINDINGS/  IMPRESSION:  Small left pleural effusion. Nonspecific opacity persistent in the right   perihilar region. CT chest recommended.    Stable nonspecific right paratracheal density which may be related to   overlying soft tissue/thyroid gland.    Heart size cannot be accurately assessedin this projection.      < end of copied text > Vital Signs Last 24 Hrs  T(C): --  T(F): --  HR: 56 (16 May 2019 21:45) (56 - 68)  BP: 89/48 (16 May 2019 21:45) (89/48 - 147/90)  BP(mean): --  RR: 20 (16 May 2019 21:45) (20 - 20)  SpO2: 98% (16 May 2019 21:45) (98% - 98%)                            12.4   29.28 )-----------( 64       ( 16 May 2019 16:36 )             42.2       05-16    184<HH>  |  150<H>  |  63<H>  ----------------------------<  214<H>  4.5   |  26  |  3.01<H>    Ca    8.5      16 May 2019 16:36  Mg     3.1     05-16    TPro  5.4<L>  /  Alb  2.6<L>  /  TBili  0.7  /  DBili  x   /  AST  55<H>  /  ALT  35  /  AlkPhos  147<H>  05-16                  PT/INR - ( 16 May 2019 16:36 )   PT: 13.0 sec;   INR: 1.17 ratio         PTT - ( 16 May 2019 16:36 )  PTT:32.9 sec    Lactate Trend  05-17 @ 00:02 Lactate:1.4   05-16 @ 16:36 Lactate:4.7       CARDIAC MARKERS ( 16 May 2019 16:36 )  x     / x     / 111 U/L / x     / x            CAPILLARY BLOOD GLUCOSE      POCT Blood Glucose.: 206 mg/dL (16 May 2019 14:47)        < from: CT Head No Cont (05.16.19 @ 16:47) >    Impression:    No intracranial hemorrhage or shift.      < end of copied text >    < from: Xray Chest 1 View AP/PA (05.16.19 @ 15:21) >    FINDINGS/  IMPRESSION:  Small left pleural effusion. Nonspecific opacity persistent in the right   perihilar region. CT chest recommended.  Stable nonspecific right paratracheal density which may be related to   overlying soft tissue/thyroid gland.  Heart size cannot be accurately assessedin this projection.  < end of copied text >

## 2019-05-17 NOTE — H&P ADULT - NSHPPHYSICALEXAM_GEN_ALL_CORE
· Constitutional	old lady sleeping comfortably   · Respiratory	Breath Sounds equal & clear to percussion & auscultation, no accessory muscle use  · Cardiovascular	Regular rate & rhythm, normal S1, S2; no murmurs, gallops or rubs; no S3, S4  · Gastrointestinal	Soft, non-tender, no hepatosplenomegaly, normal bowel sounds  · Extremities	No cyanosis, clubbing or edema   · Neurological	AAOx0 at baseline, sleeping on exam - arousable to verbal stimuli   · Musculoskeletal	No joint pain, swelling or deformity; no limitation of movement

## 2019-05-17 NOTE — CONSULT NOTE ADULT - SUBJECTIVE AND OBJECTIVE BOX
Chief complain/ HPI  89 years old came to the ER for change in MS, increased weaknes and stopped her usual oral intake, one day before admission.  Home attending says that her appetite was good up to a day before yesterday.  She stopped swallowing liquids and food.  Lab showed DAMON and sodium 184. After 1 liter if NS sodium was 177 and IV fluids was stopped  D5W started at 6am.  Patient is on diuretics and potasium supplement.        PAST MEDICAL & SURGICAL HISTORY:  CHF (congestive heart failure)  UTI (urinary tract infection)  Dementia  Hypertension  Depression  HTN (hypertension)  CLL (chronic lymphocytic leukemia)  Femur fracture, right      Home Medications Reviewed    Hospital Medications:   MEDICATIONS  (STANDING):  dextrose 5%. 1000 milliLiter(s) (100 mL/Hr) IV Continuous <Continuous>  heparin  Injectable 5000 Unit(s) SubCutaneous every 12 hours  mirtazapine 15 milliGRAM(s) Oral at bedtime    MEDICATIONS  (PRN):      Allergies    No Known Allergies    Intolerances                              11.1   21.47 )-----------( 47       ( 17 May 2019 08:13 )             37.9         176<HH>  |  149<H>  |  51<H>  ----------------------------<  110<H>  3.9   |  24  |  2.56<H>    Ca    7.9<L>      17 May 2019 08:13  Phos  2.2       Mg     2.7         TPro  4.7<L>  /  Alb  2.3<L>  /  TBili  0.8  /  DBili  x   /  AST  55<H>  /  ALT  31  /  AlkPhos  125<H>      PT/INR - ( 16 May 2019 16:36 )   PT: 13.0 sec;   INR: 1.17 ratio         PTT - ( 16 May 2019 16:36 )  PTT:32.9 sec  Urinalysis Basic - ( 17 May 2019 01:41 )    Color: Yellow / Appearance: Slightly Turbid / S.010 / pH: x  Gluc: x / Ketone: Negative  / Bili: Negative / Urobili: Negative   Blood: x / Protein: 15 / Nitrite: Negative   Leuk Esterase: Moderate / RBC: 2-5 /HPF / WBC 6-10 /HPF   Sq Epi: x / Non Sq Epi: Occasional /HPF / Bacteria: Trace /HPF      Creatinine, Random Urine: 49 mg/dL ( @ 01:39)  Sodium, Random Urine: 131 mmol/L ( 01:39)  Osmolality, Random Urine: 579 mosm/Kg ( @ 01:39)  Potassium, Random Urine: 57 mmol/L ( 01:39)        RADIOLOGY & ADDITIONAL STUDIES:    SOCIAL HISTORY: Denies ETOh,Smoking,     FAMILY HISTORY:  No pertinent family history in first degree relatives        REVIEW OF SYSTEMS:  lethargic    VITALS:  Vital Signs Last 24 Hrs  T(C): 36.2 (17 May 2019 05:20), Max: 36.2 (17 May 2019 02:53)  T(F): 97.2 (17 May 2019 05:20), Max: 97.2 (17 May 2019 05:20)  HR: 60 (17 May 2019 05:20) (56 - 70)  BP: 130/43 (17 May 2019 05:20) (89/48 - 147/90)  BP(mean): --  RR: 18 (17 May 2019 05:20) (16 - 20)  SpO2: 93% (17 May 2019 05:20) (93% - 98%)     @ 07:01  -   @ 07:00  --------------------------------------------------------  IN: 0 mL / OUT: 375 mL / NET: -375 mL      Height (cm): 134.62 ( 13:48)  Weight (kg): 59 ( @ 13:48)  BMI (kg/m2): 32.6 ( @ 13:48)  BSA (m2): 1.42 ( @ 13:48)    PHYSICAL EXAM:  Constitutional: NAD  Not follow commends  Respiratory:  air entrance B/L, no wheezes, rales or rhonchi  Cardiovascular: S1, S2, RRR, no pericardial rub, no murmur  Gastrointestinal: BS+, soft, no tenderness, no distension, no bruit  Pelvis: bladder non-distended, no CVA tenderness  Extremities: No cyanosis or clubbing. No peripheral jai  Confused

## 2019-05-17 NOTE — H&P ADULT - NSICDXPASTMEDICALHX_GEN_ALL_CORE_FT
PAST MEDICAL HISTORY:  CHF (congestive heart failure)     CLL (chronic lymphocytic leukemia)     Dementia     Depression     HTN (hypertension)     Hypertension     UTI (urinary tract infection)

## 2019-05-17 NOTE — H&P ADULT - HISTORY OF PRESENT ILLNESS
89/F from home with 24*7 HHA with PMH of CHF, CLL, dementia, depression, HTN, UTI, BIB daughter to the ED for weakness and jerking of hands and neck today without foaming at the mouth. HHA at bedside reports that for last 3 days she was not able to sleep feeling more agitated biting her self and was more aggressive then her baseline. Today HHA noted her to have jerking movement of her arms and had so she was  brought to hospital. Patient was awake during episode, no foaming, tongue bite, urinary or bladder incontinence noted during episode. HHA also reports that she was eating, drinking. Reports being on sodium supplement. Denies nausea, vomiting, diarrhea, abdominal pain, SOB, chest pain, HOLLOWAY, palpitations, dizziness, headache, cough, wheezing, joint pain or swelling, fever, chills.

## 2019-05-17 NOTE — H&P ADULT - ATTENDING COMMENTS
89/F from home with 24*7 HHA with PMH of CHF, CLL, dementia, depression, HTN, UTI, BIB daughter to the ED for weakness and jerking of hands and neck today without foaming at the mouth. HHA at bedside reports that for last 3 days she was not able to sleep feeling more agitated biting her self and was more aggressive then her baseline. Today HHA noted her to have jerking movement of her arms and had so she was  brought to hospital. Patient was awake during episode, no foaming, tongue bite, urinary or bladder incontinence noted during episode. HHA also reports that she was eating, drinking. Reports being on sodium supplement. Denies nausea, vomiting, diarrhea, abdominal pain, SOB, chest pain, HOLLOWAY, palpitations, dizziness, headache, cough, wheezing, joint pain or swelling, fever, chills.       pt seen in bed, vitals stable, physical exam reveals lungs cta b/l, heart s1s2, abd soft nd nt bs+, ext no edema. labs and diagnostic test result reviewed.    assessment  --  uti, hypernatremia, dehydration, encephalopathy, transaminitis, ramos, h/o htn, depression     plan  --  admit to med, cont preadmit home meds, gi and dvt profilaxis,  cbc, bmp, mg, phos, lipids, tsh, bld cx, ua, ucx,    ivf  cxr   ct head 89/F from home with 24*7 HHA with PMH of CHF, CLL, dementia, depression, HTN, UTI, BIB daughter to the ED for weakness and jerking of hands and neck today without foaming at the mouth. HHA at bedside reports that for last 3 days she was not able to sleep feeling more agitated biting her self and was more aggressive then her baseline. Today HHA noted her to have jerking movement of her arms and had so she was  brought to hospital. Patient was awake during episode, no foaming, tongue bite, urinary or bladder incontinence noted during episode. JTA also reports that she was eating, drinking up to 2 days ago. Reports being on sodium supplement and diuretics. Denies nausea, vomiting, diarrhea, abdominal pain, SOB, chest pain, HOLLOWAY, palpitations, dizziness, headache, cough, wheezing, joint pain or swelling, fever, chills.       pt seen in bed, vitals stable, physical exam reveals lungs cta b/l, heart s1s2, abd soft nd nt bs+, ext no edema. labs and diagnostic test result reviewed.    assessment  --  uti, hypernatremia, dehydration, encephalopathy, transaminitis, ramos, h/o htn, depression     plan  --  admit to med, rocephin 1 g  daily cont preadmit home meds, gi and dvt profilaxis, ivf, hold duiretics and sodium  cbc, bmp, mg, phos, lipids, tsh, bld cx, ua, ucx,    renal cons

## 2019-05-17 NOTE — H&P ADULT - PROBLEM SELECTOR PLAN 8
IMPROVE VTE Individual Risk Assessment          RISK                                                          Points  [  ] Previous VTE                                                3  [  ] Thrombophilia                                             2  [  ] Lower limb paralysis                                   2        (unable to hold up >15 seconds)    [  ] Current Cancer                                             2         (within 6 months)  [  ] Immobilization > 24 hrs                              1  [  ] ICU/CCU stay > 24 hours                             1  [  ] Age > 60                                                         1    IMPROVE VTE Score: 2  started on heparin subq

## 2019-05-17 NOTE — H&P ADULT - PROBLEM SELECTOR PLAN 1
presented with Na level of 184  likely dehydration + Na supplements as per HHA   elevated Na level on previous admission   euvolumic on exam after 3 L fluids given in ed   HHA reports regular PO intake  repeat BMP   follow urine lytes   correction at 6-8 mEQ/24 hours   Nephro Dr. Day

## 2019-05-18 LAB
ANION GAP SERPL CALC-SCNC: 3 MMOL/L — LOW (ref 5–17)
ANION GAP SERPL CALC-SCNC: 5 MMOL/L — SIGNIFICANT CHANGE UP (ref 5–17)
ANION GAP SERPL CALC-SCNC: 6 MMOL/L — SIGNIFICANT CHANGE UP (ref 5–17)
ANION GAP SERPL CALC-SCNC: 6 MMOL/L — SIGNIFICANT CHANGE UP (ref 5–17)
BUN SERPL-MCNC: 36 MG/DL — HIGH (ref 7–18)
BUN SERPL-MCNC: 37 MG/DL — HIGH (ref 7–18)
BUN SERPL-MCNC: 42 MG/DL — HIGH (ref 7–18)
BUN SERPL-MCNC: 42 MG/DL — HIGH (ref 7–18)
CALCIUM SERPL-MCNC: 7.7 MG/DL — LOW (ref 8.4–10.5)
CALCIUM SERPL-MCNC: 7.8 MG/DL — LOW (ref 8.4–10.5)
CALCIUM SERPL-MCNC: 7.8 MG/DL — LOW (ref 8.4–10.5)
CALCIUM SERPL-MCNC: 8.1 MG/DL — LOW (ref 8.4–10.5)
CHLORIDE SERPL-SCNC: 136 MMOL/L — HIGH (ref 96–108)
CHLORIDE SERPL-SCNC: 137 MMOL/L — HIGH (ref 96–108)
CHLORIDE SERPL-SCNC: 140 MMOL/L — HIGH (ref 96–108)
CHLORIDE SERPL-SCNC: 141 MMOL/L — HIGH (ref 96–108)
CO2 SERPL-SCNC: 22 MMOL/L — SIGNIFICANT CHANGE UP (ref 22–31)
CO2 SERPL-SCNC: 24 MMOL/L — SIGNIFICANT CHANGE UP (ref 22–31)
CREAT SERPL-MCNC: 2.46 MG/DL — HIGH (ref 0.5–1.3)
CREAT SERPL-MCNC: 2.5 MG/DL — HIGH (ref 0.5–1.3)
CREAT SERPL-MCNC: 2.5 MG/DL — HIGH (ref 0.5–1.3)
CREAT SERPL-MCNC: 2.73 MG/DL — HIGH (ref 0.5–1.3)
GLUCOSE BLDC GLUCOMTR-MCNC: 100 MG/DL — HIGH (ref 70–99)
GLUCOSE BLDC GLUCOMTR-MCNC: 98 MG/DL — SIGNIFICANT CHANGE UP (ref 70–99)
GLUCOSE SERPL-MCNC: 106 MG/DL — HIGH (ref 70–99)
GLUCOSE SERPL-MCNC: 106 MG/DL — HIGH (ref 70–99)
GLUCOSE SERPL-MCNC: 115 MG/DL — HIGH (ref 70–99)
GLUCOSE SERPL-MCNC: 123 MG/DL — HIGH (ref 70–99)
HCT VFR BLD CALC: 38.1 % — SIGNIFICANT CHANGE UP (ref 34.5–45)
HGB BLD-MCNC: 11.5 G/DL — SIGNIFICANT CHANGE UP (ref 11.5–15.5)
MAGNESIUM SERPL-MCNC: 2.4 MG/DL — SIGNIFICANT CHANGE UP (ref 1.6–2.6)
MCHC RBC-ENTMCNC: 30.2 GM/DL — LOW (ref 32–36)
MCHC RBC-ENTMCNC: 35.1 PG — HIGH (ref 27–34)
MCV RBC AUTO: 116.2 FL — HIGH (ref 80–100)
NRBC # BLD: 0 /100 WBCS — SIGNIFICANT CHANGE UP (ref 0–0)
PHOSPHATE SERPL-MCNC: 2.6 MG/DL — SIGNIFICANT CHANGE UP (ref 2.5–4.5)
PLATELET # BLD AUTO: 56 K/UL — LOW (ref 150–400)
POTASSIUM SERPL-MCNC: 3.6 MMOL/L — SIGNIFICANT CHANGE UP (ref 3.5–5.3)
POTASSIUM SERPL-MCNC: 3.8 MMOL/L — SIGNIFICANT CHANGE UP (ref 3.5–5.3)
POTASSIUM SERPL-MCNC: 3.9 MMOL/L — SIGNIFICANT CHANGE UP (ref 3.5–5.3)
POTASSIUM SERPL-MCNC: 4.8 MMOL/L — SIGNIFICANT CHANGE UP (ref 3.5–5.3)
POTASSIUM SERPL-SCNC: 3.6 MMOL/L — SIGNIFICANT CHANGE UP (ref 3.5–5.3)
POTASSIUM SERPL-SCNC: 3.8 MMOL/L — SIGNIFICANT CHANGE UP (ref 3.5–5.3)
POTASSIUM SERPL-SCNC: 3.9 MMOL/L — SIGNIFICANT CHANGE UP (ref 3.5–5.3)
POTASSIUM SERPL-SCNC: 4.8 MMOL/L — SIGNIFICANT CHANGE UP (ref 3.5–5.3)
RBC # BLD: 3.28 M/UL — LOW (ref 3.8–5.2)
RBC # FLD: 15.4 % — HIGH (ref 10.3–14.5)
SODIUM SERPL-SCNC: 165 MMOL/L — CRITICAL HIGH (ref 135–145)
SODIUM SERPL-SCNC: 165 MMOL/L — CRITICAL HIGH (ref 135–145)
SODIUM SERPL-SCNC: 168 MMOL/L — CRITICAL HIGH (ref 135–145)
SODIUM SERPL-SCNC: 170 MMOL/L — CRITICAL HIGH (ref 135–145)
WBC # BLD: 24.8 K/UL — HIGH (ref 3.8–10.5)
WBC # FLD AUTO: 24.8 K/UL — HIGH (ref 3.8–10.5)

## 2019-05-18 RX ORDER — SODIUM CHLORIDE 9 MG/ML
1000 INJECTION, SOLUTION INTRAVENOUS
Refills: 0 | Status: DISCONTINUED | OUTPATIENT
Start: 2019-05-18 | End: 2019-05-18

## 2019-05-18 RX ORDER — SODIUM CHLORIDE 9 MG/ML
1000 INJECTION, SOLUTION INTRAVENOUS
Refills: 0 | Status: DISCONTINUED | OUTPATIENT
Start: 2019-05-18 | End: 2019-05-19

## 2019-05-18 RX ADMIN — HEPARIN SODIUM 5000 UNIT(S): 5000 INJECTION INTRAVENOUS; SUBCUTANEOUS at 05:46

## 2019-05-18 RX ADMIN — SODIUM CHLORIDE 75 MILLILITER(S): 9 INJECTION, SOLUTION INTRAVENOUS at 17:00

## 2019-05-18 RX ADMIN — SODIUM CHLORIDE 100 MILLILITER(S): 9 INJECTION, SOLUTION INTRAVENOUS at 13:40

## 2019-05-18 RX ADMIN — HEPARIN SODIUM 5000 UNIT(S): 5000 INJECTION INTRAVENOUS; SUBCUTANEOUS at 17:01

## 2019-05-18 RX ADMIN — SODIUM CHLORIDE 100 MILLILITER(S): 9 INJECTION, SOLUTION INTRAVENOUS at 05:49

## 2019-05-18 NOTE — PROGRESS NOTE ADULT - SUBJECTIVE AND OBJECTIVE BOX
Patient is a 89y old  Female who presents with a chief complaint of weakness (17 May 2019 13:11)    pt seen in icu [  ], reg med floor [x   ], bed [x  ], chair at bedside [   ], awake and responsive [ x ], lethargic [  ],  nad [ x ]        Allergies    No Known Allergies        Vitals    T(F): 97.6 (05-18-19 @ 05:20), Max: 98.4 (05-17-19 @ 14:13)  HR: 69 (05-18-19 @ 06:39) (69 - 78)  BP: 113/38 (05-18-19 @ 06:39) (113/38 - 121/107)  RR: 18 (05-18-19 @ 05:20) (18 - 18)  SpO2: 97% (05-18-19 @ 05:20) (95% - 97%)  Wt(kg): --  CAPILLARY BLOOD GLUCOSE      POCT Blood Glucose.: 206 mg/dL (16 May 2019 14:47)      Labs                          11.5   24.80 )-----------( 56       ( 18 May 2019 07:30 )             38.1       05-18    170<HH>  |  140<H>  |  42<H>  ----------------------------<  123<H>  4.8   |  24  |  2.73<H>    Ca    8.1<L>      18 May 2019 07:30  Phos  2.6     05-18  Mg     2.4     05-18    TPro  4.7<L>  /  Alb  2.3<L>  /  TBili  0.8  /  DBili  x   /  AST  55<H>  /  ALT  31  /  AlkPhos  125<H>  05-17      CARDIAC MARKERS ( 16 May 2019 16:36 )  x     / x     / 111 U/L / x     / x            .Blood  05-17 @ 00:36   No growth to date.  --  --          Radiology Results      Meds    MEDICATIONS  (STANDING):  dextrose 5%. 1000 milliLiter(s) (100 mL/Hr) IV Continuous <Continuous>  heparin  Injectable 5000 Unit(s) SubCutaneous every 12 hours  mirtazapine 15 milliGRAM(s) Oral at bedtime      MEDICATIONS  (PRN):      Physical Exam    Neuro :  no focal deficits  Respiratory: CTA B/L  CV: RRR, S1S2, no murmurs,   Abdominal: Soft, NT, ND +BS,  Extremities: No edema, + peripheral pulses    ASSESSMENT      uti, hypernatremia, dehydration, encephalopathy, transaminitis, ramos, h/o htn, depression   CHF (congestive heart failure)  UTI (urinary tract infection)  Dementia  Hypertension  Depression  HTN (hypertension)  CLL (chronic lymphocytic leukemia)  Femur fracture, right      PLAN      cont rocephin 1 g  daily x 7 days  blood cx neg noted above  f/u ucx  renal f/u.Continue hypotonic solution D5W, AT A RATE OF 80 ML/HOUR, ALLOWED 12 MEQ CHANGE IN 24 HOURS.  Follow sodium q 8 hours and adjust fluid intake as need  po4 and mg wnl noted above   cont current meds

## 2019-05-18 NOTE — PROGRESS NOTE ADULT - SUBJECTIVE AND OBJECTIVE BOX
Problem List:  Hyponatremia, 184 on admission and DAMON.  Diuretics prior to admission.    PAST MEDICAL & SURGICAL HISTORY:  CHF (congestive heart failure)  UTI (urinary tract infection)  Dementia  Hypertension  Depression  HTN (hypertension)  CLL (chronic lymphocytic leukemia)  Femur fracture, right      No Known Allergies      MEDICATIONS  (STANDING):  dextrose 5%. 1000 milliLiter(s) (100 mL/Hr) IV Continuous <Continuous>  dextrose 5%. 1000 milliLiter(s) (75 mL/Hr) IV Continuous <Continuous>  heparin  Injectable 5000 Unit(s) SubCutaneous every 12 hours  mirtazapine 15 milliGRAM(s) Oral at bedtime    MEDICATIONS  (PRN):                            11.5   24.80 )-----------( 56       ( 18 May 2019 07:30 )             38.1     05-18    165<HH>  |  137<H>  |  36<H>  ----------------------------<  106<H>  3.9   |  22  |  2.50<H>    Ca    7.8<L>      18 May 2019 14:34  Phos  2.6     05-18  Mg     2.4     05-18    TPro  4.7<L>  /  Alb  2.3<L>  /  TBili  0.8  /  DBili  x   /  AST  55<H>  /  ALT  31  /  AlkPhos  125<H>  05-17    PT/INR - ( 16 May 2019 16:36 )   PT: 13.0 sec;   INR: 1.17 ratio         PTT - ( 16 May 2019 16:36 )  PTT:32.9 sec    Creatinine, Random Urine: 49 mg/dL (05-17 @ 01:39)  Sodium, Random Urine: 131 mmol/L (05-17 @ 01:39)  Osmolality, Random Urine: 579 mosm/Kg (05-17 @ 01:39)  Potassium, Random Urine: 57 mmol/L (05-17 @ 01:39)      REVIEW OF SYSTEMS:  dementia      VITALS:  T(F): 98.2 (05-18-19 @ 13:39), Max: 98.2 (05-18-19 @ 13:39)  HR: 75 (05-18-19 @ 13:39)  BP: 120/50 (05-18-19 @ 13:39)  RR: 18 (05-18-19 @ 13:39)  SpO2: 95% (05-18-19 @ 13:39)  Wt(kg): --    05-17 @ 07:01  -  05-18 @ 07:00  --------------------------------------------------------  IN: 0 mL / OUT: 750 mL / NET: -750 mL        PHYSICAL EXAM:  Constitutional: well developed, no diaphoresis, no distress.  Neck: No JVD, no carotid bruit, supple, no adenopathy  Respiratory: Good air entrance B/L, no wheezes, rales or rhonchi  Cardiovascular: S1, S2, RRR, no pericardial rub, no murmur  Abdomen: BS+, soft, no tenderness, no bruit  Pelvis: bladder nondistended  Extremities: No cyanosis or clubbing. No peripheral edema.   More awake but  not follow commeds Problem List:  Hypernatremia, 184 on admission and DAMON.  Diuretics prior to admission.    PAST MEDICAL & SURGICAL HISTORY:  CHF (congestive heart failure)  UTI (urinary tract infection)  Dementia  Hypertension  Depression  HTN (hypertension)  CLL (chronic lymphocytic leukemia)  Femur fracture, right      No Known Allergies      MEDICATIONS  (STANDING):  dextrose 5%. 1000 milliLiter(s) (100 mL/Hr) IV Continuous <Continuous>  dextrose 5%. 1000 milliLiter(s) (75 mL/Hr) IV Continuous <Continuous>  heparin  Injectable 5000 Unit(s) SubCutaneous every 12 hours  mirtazapine 15 milliGRAM(s) Oral at bedtime    MEDICATIONS  (PRN):                            11.5   24.80 )-----------( 56       ( 18 May 2019 07:30 )             38.1     05-18    165<HH>  |  137<H>  |  36<H>  ----------------------------<  106<H>  3.9   |  22  |  2.50<H>    Ca    7.8<L>      18 May 2019 14:34  Phos  2.6     05-18  Mg     2.4     05-18    TPro  4.7<L>  /  Alb  2.3<L>  /  TBili  0.8  /  DBili  x   /  AST  55<H>  /  ALT  31  /  AlkPhos  125<H>  05-17    PT/INR - ( 16 May 2019 16:36 )   PT: 13.0 sec;   INR: 1.17 ratio         PTT - ( 16 May 2019 16:36 )  PTT:32.9 sec    Creatinine, Random Urine: 49 mg/dL (05-17 @ 01:39)  Sodium, Random Urine: 131 mmol/L (05-17 @ 01:39)  Osmolality, Random Urine: 579 mosm/Kg (05-17 @ 01:39)  Potassium, Random Urine: 57 mmol/L (05-17 @ 01:39)      REVIEW OF SYSTEMS:  dementia      VITALS:  T(F): 98.2 (05-18-19 @ 13:39), Max: 98.2 (05-18-19 @ 13:39)  HR: 75 (05-18-19 @ 13:39)  BP: 120/50 (05-18-19 @ 13:39)  RR: 18 (05-18-19 @ 13:39)  SpO2: 95% (05-18-19 @ 13:39)  Wt(kg): --    05-17 @ 07:01  -  05-18 @ 07:00  --------------------------------------------------------  IN: 0 mL / OUT: 750 mL / NET: -750 mL        PHYSICAL EXAM:  Constitutional: well developed, no diaphoresis, no distress.  Neck: No JVD, no carotid bruit, supple, no adenopathy  Respiratory: Good air entrance B/L, no wheezes, rales or rhonchi  Cardiovascular: S1, S2, RRR, no pericardial rub, no murmur  Abdomen: BS+, soft, no tenderness, no bruit  Pelvis: bladder nondistended  Extremities: No cyanosis or clubbing. No peripheral edema.   More awake but  not follow commeds

## 2019-05-18 NOTE — PROGRESS NOTE ADULT - ASSESSMENT
Hyponatremia on Isotonic fluids  PM sodium 165  Continue isotonic Fluids to reduced sodium by 12 in 24 hours.  Down to 158 in AM  Continue to follow renal function and sodium.    DAMON continue IV fluids. Hypernatremia on Isotonic fluids  PM sodium 165  Continue isotonic Fluids to reduced sodium by 12 in 24 hours.  Down to 158 in AM  Continue to follow renal function and sodium.    DAMON continue IV fluids.

## 2019-05-19 LAB
ALBUMIN SERPL ELPH-MCNC: 2.4 G/DL — LOW (ref 3.5–5)
ALP SERPL-CCNC: 128 U/L — HIGH (ref 40–120)
ALT FLD-CCNC: 33 U/L DA — SIGNIFICANT CHANGE UP (ref 10–60)
ANION GAP SERPL CALC-SCNC: 6 MMOL/L — SIGNIFICANT CHANGE UP (ref 5–17)
ANION GAP SERPL CALC-SCNC: 9 MMOL/L — SIGNIFICANT CHANGE UP (ref 5–17)
AST SERPL-CCNC: 79 U/L — HIGH (ref 10–40)
BILIRUB DIRECT SERPL-MCNC: 0.3 MG/DL — HIGH (ref 0–0.2)
BILIRUB INDIRECT FLD-MCNC: 0.8 MG/DL — SIGNIFICANT CHANGE UP (ref 0.2–1)
BILIRUB SERPL-MCNC: 1.1 MG/DL — SIGNIFICANT CHANGE UP (ref 0.2–1.2)
BUN SERPL-MCNC: 30 MG/DL — HIGH (ref 7–18)
BUN SERPL-MCNC: 33 MG/DL — HIGH (ref 7–18)
BUN SERPL-MCNC: 34 MG/DL — HIGH (ref 7–18)
BUN SERPL-MCNC: 34 MG/DL — HIGH (ref 7–18)
CALCIUM SERPL-MCNC: 7.6 MG/DL — LOW (ref 8.4–10.5)
CALCIUM SERPL-MCNC: 7.7 MG/DL — LOW (ref 8.4–10.5)
CALCIUM SERPL-MCNC: 7.8 MG/DL — LOW (ref 8.4–10.5)
CALCIUM SERPL-MCNC: 7.8 MG/DL — LOW (ref 8.4–10.5)
CHLORIDE SERPL-SCNC: 125 MMOL/L — HIGH (ref 96–108)
CHLORIDE SERPL-SCNC: 128 MMOL/L — HIGH (ref 96–108)
CHLORIDE SERPL-SCNC: 130 MMOL/L — HIGH (ref 96–108)
CHLORIDE SERPL-SCNC: 132 MMOL/L — HIGH (ref 96–108)
CO2 SERPL-SCNC: 22 MMOL/L — SIGNIFICANT CHANGE UP (ref 22–31)
CO2 SERPL-SCNC: 22 MMOL/L — SIGNIFICANT CHANGE UP (ref 22–31)
CO2 SERPL-SCNC: 24 MMOL/L — SIGNIFICANT CHANGE UP (ref 22–31)
CO2 SERPL-SCNC: 24 MMOL/L — SIGNIFICANT CHANGE UP (ref 22–31)
CREAT SERPL-MCNC: 2.38 MG/DL — HIGH (ref 0.5–1.3)
CREAT SERPL-MCNC: 2.41 MG/DL — HIGH (ref 0.5–1.3)
CREAT SERPL-MCNC: 2.43 MG/DL — HIGH (ref 0.5–1.3)
CREAT SERPL-MCNC: 2.47 MG/DL — HIGH (ref 0.5–1.3)
GLUCOSE BLDC GLUCOMTR-MCNC: 111 MG/DL — HIGH (ref 70–99)
GLUCOSE BLDC GLUCOMTR-MCNC: 114 MG/DL — HIGH (ref 70–99)
GLUCOSE SERPL-MCNC: 104 MG/DL — HIGH (ref 70–99)
GLUCOSE SERPL-MCNC: 111 MG/DL — HIGH (ref 70–99)
GLUCOSE SERPL-MCNC: 111 MG/DL — HIGH (ref 70–99)
GLUCOSE SERPL-MCNC: 133 MG/DL — HIGH (ref 70–99)
POTASSIUM SERPL-MCNC: 3.9 MMOL/L — SIGNIFICANT CHANGE UP (ref 3.5–5.3)
POTASSIUM SERPL-MCNC: 4 MMOL/L — SIGNIFICANT CHANGE UP (ref 3.5–5.3)
POTASSIUM SERPL-MCNC: 4 MMOL/L — SIGNIFICANT CHANGE UP (ref 3.5–5.3)
POTASSIUM SERPL-MCNC: 4.1 MMOL/L — SIGNIFICANT CHANGE UP (ref 3.5–5.3)
POTASSIUM SERPL-SCNC: 3.9 MMOL/L — SIGNIFICANT CHANGE UP (ref 3.5–5.3)
POTASSIUM SERPL-SCNC: 4 MMOL/L — SIGNIFICANT CHANGE UP (ref 3.5–5.3)
POTASSIUM SERPL-SCNC: 4 MMOL/L — SIGNIFICANT CHANGE UP (ref 3.5–5.3)
POTASSIUM SERPL-SCNC: 4.1 MMOL/L — SIGNIFICANT CHANGE UP (ref 3.5–5.3)
PROT SERPL-MCNC: 4.9 G/DL — LOW (ref 6–8.3)
SODIUM SERPL-SCNC: 156 MMOL/L — HIGH (ref 135–145)
SODIUM SERPL-SCNC: 160 MMOL/L — CRITICAL HIGH (ref 135–145)
SODIUM SERPL-SCNC: 161 MMOL/L — CRITICAL HIGH (ref 135–145)
SODIUM SERPL-SCNC: 163 MMOL/L — CRITICAL HIGH (ref 135–145)

## 2019-05-19 PROCEDURE — 76770 US EXAM ABDO BACK WALL COMP: CPT | Mod: 26

## 2019-05-19 RX ORDER — SODIUM CHLORIDE 9 MG/ML
1000 INJECTION, SOLUTION INTRAVENOUS
Refills: 0 | Status: DISCONTINUED | OUTPATIENT
Start: 2019-05-19 | End: 2019-05-19

## 2019-05-19 RX ORDER — SODIUM CHLORIDE 9 MG/ML
1000 INJECTION, SOLUTION INTRAVENOUS
Refills: 0 | Status: DISCONTINUED | OUTPATIENT
Start: 2019-05-19 | End: 2019-05-20

## 2019-05-19 RX ADMIN — SODIUM CHLORIDE 75 MILLILITER(S): 9 INJECTION, SOLUTION INTRAVENOUS at 14:33

## 2019-05-19 RX ADMIN — HEPARIN SODIUM 5000 UNIT(S): 5000 INJECTION INTRAVENOUS; SUBCUTANEOUS at 05:48

## 2019-05-19 RX ADMIN — HEPARIN SODIUM 5000 UNIT(S): 5000 INJECTION INTRAVENOUS; SUBCUTANEOUS at 17:11

## 2019-05-19 RX ADMIN — SODIUM CHLORIDE 100 MILLILITER(S): 9 INJECTION, SOLUTION INTRAVENOUS at 17:16

## 2019-05-19 NOTE — PROVIDER CONTACT NOTE (CRITICAL VALUE NOTIFICATION) - SITUATION
Continue with IV fluids
Continue with IV fluids, Monitor NA level, in 6hrs
Continue to monitor NA , BMP order
Continue to monitor NA level,
Continue to monitor Sodium levels with routine AM labs at this time. No further intervention taken at this time.
Monitor BMP,

## 2019-05-19 NOTE — PROGRESS NOTE ADULT - SUBJECTIVE AND OBJECTIVE BOX
hyponatremia and DAMON  Dehydration due to diuretics    PAST MEDICAL & SURGICAL HISTORY:  CHF (congestive heart failure)  UTI (urinary tract infection)  Dementia  Hypertension  Depression  HTN (hypertension)  CLL (chronic lymphocytic leukemia)  Femur fracture, right      No Known Allergies      MEDICATIONS  (STANDING):  heparin  Injectable 5000 Unit(s) SubCutaneous every 12 hours  mirtazapine 15 milliGRAM(s) Oral at bedtime    MEDICATIONS  (PRN):    Basic Metabolic Panel in AM (10.01.18 @ 07:13)    Creatinine, Serum: 1.15 mg/dL    Thyroid Stimulating Hormone, Serum: 2.99 uU/mL (05.17.19 @ 08:13)  Indication: Persistent perihilar opacity.    Technique: Axial multidetector CT images of the chest are acquired   without IV contrast.    Comparison: None.    Findings: Trace left pleural effusion. No pericardial effusion. Mild   cardiomegaly. Aortic, coronary artery and mitral annular calcifications   are present. No aortic aneurysm. Small collapsed hiatal hernia versus   mild distal esophageal mural thickening. Multiple lesions in the thyroid   bilaterally with the largest measuring up to 1.5 cm on the right.   Allowing for the noncontrast technique, there is no grossly enlarged   mediastinal, hilar, or axillarylymph node. The trachea and central   bronchi appear grossly patent.    Mild bilateral atelectasis. No evidence for pulmonary consolidation. In   the superior segment of the right lower lobe, there is a 2.6 x 3.9 cm   lung mass with calcifications. This may represent a calcified granuloma,   lung cancer with engulfed calcified granuloma, or metastasis with   calcification.    Limited sections through the upper abdomen demonstrate cholelithiasis   with mild pericholecystic fluid. Stranding is noted adjacent to the   gallbladder, duodenum and pancreas. Findings may represent acute   cholecystitis, nonspecific duodenitis, and/or acute pancreatitis.   Clinical correlation is recommended. Small ascites. Nonspecific mild left   adrenal thickening.    Stable old compression fracture at T11 vertebra.    Impression: Trace left pleural effusion.    Small collapsed hiatal hernia versus mild distal esophageal mural   thickening. If clinically indicated, EGD may be pursued for further   evaluation.                           11.5   24.80 )-----------( 56       ( 18 May 2019 07:30 )             38.1     05-19    160<HH>  |  132<H>  |  34<H>  ----------------------------<  111<H>  4.0   |  22  |  2.38<H>    Ca    7.8<L>      19 May 2019 00:21  Phos  2.6     05-18  Mg     2.4     05-18    TPro  4.9<L>  /  Alb  2.4<L>  /  TBili  1.1  /  DBili  0.3<H>  /  AST  79<H>  /  ALT  33  /  AlkPhos  128<H>  05-19        Creatinine, Random Urine: 49 mg/dL (05-17 @ 01:39)  Sodium, Random Urine: 131 mmol/L (05-17 @ 01:39)  Osmolality, Random Urine: 579 mosm/Kg (05-17 @ 01:39)  Potassium, Random Urine: 57 mmol/L (05-17 @ 01:39)      REVIEW OF SYSTEMS:  dementia  No follow commends and still poor intake      VITALS:  T(F): 98.7 (05-19-19 @ 05:54), Max: 98.7 (05-19-19 @ 05:54)  HR: 77 (05-19-19 @ 05:54)  BP: 120/46 (05-19-19 @ 05:54)  RR: 19 (05-19-19 @ 05:54)  SpO2: 95% (05-19-19 @ 05:54)  Wt(kg): --    05-18 @ 07:01  -  05-19 @ 07:00  --------------------------------------------------------  IN: 0 mL / OUT: 475 mL / NET: -475 mL        PHYSICAL EXAM:  Constitutional: well developed, no diaphoresis, no distress.  Neck: No JVD, no carotid bruit, supple, no adenopathy  Respiratory: Good air entrance B/L, no wheezes, rales or rhonchi  Cardiovascular: S1, S2, RRR, no pericardial rub, no murmur  Abdomen: BS+, soft, no tenderness, no bruit  Pelvis: bladder nondistended  Extremities: No cyanosis or clubbing. No peripheral edema.   No response to verbal stimuli hypernatremia and DAMON  Dehydration due to diuretics    PAST MEDICAL & SURGICAL HISTORY:  CHF (congestive heart failure)  UTI (urinary tract infection)  Dementia  Hypertension  Depression  HTN (hypertension)  CLL (chronic lymphocytic leukemia)  Femur fracture, right      No Known Allergies      MEDICATIONS  (STANDING):  heparin  Injectable 5000 Unit(s) SubCutaneous every 12 hours  mirtazapine 15 milliGRAM(s) Oral at bedtime    MEDICATIONS  (PRN):    Basic Metabolic Panel in AM (10.01.18 @ 07:13)    Creatinine, Serum: 1.15 mg/dL    Thyroid Stimulating Hormone, Serum: 2.99 uU/mL (05.17.19 @ 08:13)  Indication: Persistent perihilar opacity.    Technique: Axial multidetector CT images of the chest are acquired   without IV contrast.    Comparison: None.    Findings: Trace left pleural effusion. No pericardial effusion. Mild   cardiomegaly. Aortic, coronary artery and mitral annular calcifications   are present. No aortic aneurysm. Small collapsed hiatal hernia versus   mild distal esophageal mural thickening. Multiple lesions in the thyroid   bilaterally with the largest measuring up to 1.5 cm on the right.   Allowing for the noncontrast technique, there is no grossly enlarged   mediastinal, hilar, or axillarylymph node. The trachea and central   bronchi appear grossly patent.    Mild bilateral atelectasis. No evidence for pulmonary consolidation. In   the superior segment of the right lower lobe, there is a 2.6 x 3.9 cm   lung mass with calcifications. This may represent a calcified granuloma,   lung cancer with engulfed calcified granuloma, or metastasis with   calcification.    Limited sections through the upper abdomen demonstrate cholelithiasis   with mild pericholecystic fluid. Stranding is noted adjacent to the   gallbladder, duodenum and pancreas. Findings may represent acute   cholecystitis, nonspecific duodenitis, and/or acute pancreatitis.   Clinical correlation is recommended. Small ascites. Nonspecific mild left   adrenal thickening.    Stable old compression fracture at T11 vertebra.    Impression: Trace left pleural effusion.    Small collapsed hiatal hernia versus mild distal esophageal mural   thickening. If clinically indicated, EGD may be pursued for further   evaluation.                           11.5   24.80 )-----------( 56       ( 18 May 2019 07:30 )             38.1     05-19    160<HH>  |  132<H>  |  34<H>  ----------------------------<  111<H>  4.0   |  22  |  2.38<H>    Ca    7.8<L>      19 May 2019 00:21  Phos  2.6     05-18  Mg     2.4     05-18    TPro  4.9<L>  /  Alb  2.4<L>  /  TBili  1.1  /  DBili  0.3<H>  /  AST  79<H>  /  ALT  33  /  AlkPhos  128<H>  05-19        Creatinine, Random Urine: 49 mg/dL (05-17 @ 01:39)  Sodium, Random Urine: 131 mmol/L (05-17 @ 01:39)  Osmolality, Random Urine: 579 mosm/Kg (05-17 @ 01:39)  Potassium, Random Urine: 57 mmol/L (05-17 @ 01:39)      REVIEW OF SYSTEMS:  dementia  No follow commends and still poor intake      VITALS:  T(F): 98.7 (05-19-19 @ 05:54), Max: 98.7 (05-19-19 @ 05:54)  HR: 77 (05-19-19 @ 05:54)  BP: 120/46 (05-19-19 @ 05:54)  RR: 19 (05-19-19 @ 05:54)  SpO2: 95% (05-19-19 @ 05:54)  Wt(kg): --    05-18 @ 07:01  -  05-19 @ 07:00  --------------------------------------------------------  IN: 0 mL / OUT: 475 mL / NET: -475 mL        PHYSICAL EXAM:  Constitutional: well developed, no diaphoresis, no distress.  Neck: No JVD, no carotid bruit, supple, no adenopathy  Respiratory: Good air entrance B/L, no wheezes, rales or rhonchi  Cardiovascular: S1, S2, RRR, no pericardial rub, no murmur  Abdomen: BS+, soft, no tenderness, no bruit  Pelvis: bladder nondistended  Extremities: No cyanosis or clubbing. No peripheral edema.   No response to verbal stimuli

## 2019-05-19 NOTE — PROGRESS NOTE ADULT - SUBJECTIVE AND OBJECTIVE BOX
Patient is a 89y old  Female who presents with a chief complaint of weakness (18 May 2019 15:44)    pt seen in icu [  ], reg med floor [x   ], bed [x  ], chair at bedside [   ], awake and responsive [ x ], lethargic [  ],  nad [ x ]      Allergies    No Known Allergies        Vitals    T(F): 98.7 (05-19-19 @ 05:54), Max: 98.7 (05-19-19 @ 05:54)  HR: 77 (05-19-19 @ 05:54) (75 - 77)  BP: 120/46 (05-19-19 @ 05:54) (120/46 - 120/50)  RR: 19 (05-19-19 @ 05:54) (18 - 19)  SpO2: 95% (05-19-19 @ 05:54) (95% - 95%)  Wt(kg): --  CAPILLARY BLOOD GLUCOSE      POCT Blood Glucose.: 100 mg/dL (18 May 2019 21:02)      Labs                          11.5   24.80 )-----------( 56       ( 18 May 2019 07:30 )             38.1       05-19    160<HH>  |  132<H>  |  34<H>  ----------------------------<  111<H>  4.0   |  22  |  2.38<H>    Ca    7.8<L>      19 May 2019 00:21  Phos  2.6     05-18  Mg     2.4     05-18              .Blood  05-17 @ 00:36   No growth to date.  --  --          Radiology Results      Meds    MEDICATIONS  (STANDING):  heparin  Injectable 5000 Unit(s) SubCutaneous every 12 hours  mirtazapine 15 milliGRAM(s) Oral at bedtime      MEDICATIONS  (PRN):      Physical Exam      Neuro :  no focal deficits  Respiratory: CTA B/L  CV: RRR, S1S2, no murmurs,   Abdominal: Soft, NT, ND +BS,  Extremities: No edema, + peripheral pulses    ASSESSMENT      uti,   hypernatremia,    encephalopathy,  transaminitis,   ramos,   h/o   Dementia  Hypertension  Depression  HTN (hypertension)  CLL (chronic lymphocytic leukemia)  Femur fracture, right      PLAN      cont rocephin 1 g  daily x 7 days  blood cx neg noted above  f/u ucx  renal f/u.  serum sodium and creat improving  Continue hypotonic solution D5W, AT A RATE  ML/HOUR, ALLOWED 12 MEQ CHANGE IN 24 HOURS.  Follow sodium q 8 hours and adjust fluid intake as need  po4 and mg wnl noted above   cont current meds

## 2019-05-19 NOTE — PROGRESS NOTE ADULT - ASSESSMENT
Hyponatremia improving  DAMON with mild improvement  Continue IV fluids  Obtain renal and bladder US for further evaluation    CLL elevated WBC. Hypernatremia improving  DAMON with mild improvement  Continue IV fluids  Obtain renal and bladder US for further evaluation    CLL elevated WBC.

## 2019-05-20 DIAGNOSIS — Z71.89 OTHER SPECIFIED COUNSELING: ICD-10-CM

## 2019-05-20 DIAGNOSIS — E43 UNSPECIFIED SEVERE PROTEIN-CALORIE MALNUTRITION: ICD-10-CM

## 2019-05-20 LAB
ANION GAP SERPL CALC-SCNC: 11 MMOL/L — SIGNIFICANT CHANGE UP (ref 5–17)
BASOPHILS # BLD AUTO: 0 K/UL — SIGNIFICANT CHANGE UP (ref 0–0.2)
BASOPHILS NFR BLD AUTO: 0 % — SIGNIFICANT CHANGE UP (ref 0–2)
BUN SERPL-MCNC: 31 MG/DL — HIGH (ref 7–18)
CALCIUM SERPL-MCNC: 8 MG/DL — LOW (ref 8.4–10.5)
CHLORIDE SERPL-SCNC: 124 MMOL/L — HIGH (ref 96–108)
CO2 SERPL-SCNC: 21 MMOL/L — LOW (ref 22–31)
CREAT SERPL-MCNC: 2.25 MG/DL — HIGH (ref 0.5–1.3)
EOSINOPHIL # BLD AUTO: 0.34 K/UL — SIGNIFICANT CHANGE UP (ref 0–0.5)
EOSINOPHIL NFR BLD AUTO: 1 % — SIGNIFICANT CHANGE UP (ref 0–6)
GLUCOSE SERPL-MCNC: 99 MG/DL — SIGNIFICANT CHANGE UP (ref 70–99)
HCT VFR BLD CALC: 38.4 % — SIGNIFICANT CHANGE UP (ref 34.5–45)
HGB BLD-MCNC: 11.8 G/DL — SIGNIFICANT CHANGE UP (ref 11.5–15.5)
LYMPHOCYTES # BLD AUTO: 28.59 K/UL — HIGH (ref 1–3.3)
LYMPHOCYTES # BLD AUTO: 84 % — HIGH (ref 13–44)
MAGNESIUM SERPL-MCNC: 2.2 MG/DL — SIGNIFICANT CHANGE UP (ref 1.6–2.6)
MCHC RBC-ENTMCNC: 30.7 GM/DL — LOW (ref 32–36)
MCHC RBC-ENTMCNC: 34.2 PG — HIGH (ref 27–34)
MCV RBC AUTO: 111.3 FL — HIGH (ref 80–100)
MONOCYTES # BLD AUTO: 1.7 K/UL — HIGH (ref 0–0.9)
MONOCYTES NFR BLD AUTO: 5 % — SIGNIFICANT CHANGE UP (ref 2–14)
NEUTROPHILS # BLD AUTO: 3.4 K/UL — SIGNIFICANT CHANGE UP (ref 1.8–7.4)
NEUTROPHILS NFR BLD AUTO: 10 % — LOW (ref 43–77)
PHOSPHATE SERPL-MCNC: 2.6 MG/DL — SIGNIFICANT CHANGE UP (ref 2.5–4.5)
PLATELET # BLD AUTO: 59 K/UL — LOW (ref 150–400)
POTASSIUM SERPL-MCNC: 3.5 MMOL/L — SIGNIFICANT CHANGE UP (ref 3.5–5.3)
POTASSIUM SERPL-SCNC: 3.5 MMOL/L — SIGNIFICANT CHANGE UP (ref 3.5–5.3)
RBC # BLD: 3.45 M/UL — LOW (ref 3.8–5.2)
RBC # FLD: 14.9 % — HIGH (ref 10.3–14.5)
SODIUM SERPL-SCNC: 156 MMOL/L — HIGH (ref 135–145)
WBC # BLD: 34.04 K/UL — HIGH (ref 3.8–10.5)
WBC # FLD AUTO: 34.04 K/UL — HIGH (ref 3.8–10.5)

## 2019-05-20 RX ORDER — SODIUM CHLORIDE 9 MG/ML
1000 INJECTION, SOLUTION INTRAVENOUS
Refills: 0 | Status: DISCONTINUED | OUTPATIENT
Start: 2019-05-20 | End: 2019-05-24

## 2019-05-20 RX ORDER — SODIUM CHLORIDE 9 MG/ML
1000 INJECTION, SOLUTION INTRAVENOUS
Refills: 0 | Status: DISCONTINUED | OUTPATIENT
Start: 2019-05-20 | End: 2019-05-20

## 2019-05-20 RX ADMIN — SODIUM CHLORIDE 50 MILLILITER(S): 9 INJECTION, SOLUTION INTRAVENOUS at 05:53

## 2019-05-20 RX ADMIN — HEPARIN SODIUM 5000 UNIT(S): 5000 INJECTION INTRAVENOUS; SUBCUTANEOUS at 05:53

## 2019-05-20 RX ADMIN — HEPARIN SODIUM 5000 UNIT(S): 5000 INJECTION INTRAVENOUS; SUBCUTANEOUS at 17:28

## 2019-05-20 NOTE — PROGRESS NOTE ADULT - ASSESSMENT
89/F from home with 24*7 HHA with PMH of CHF, CLL, dementia, depression, HTN, UTI, BIB daughter to the ED for weakness and jerking of hands and neck today without foaming at the mouth. HHA at bedside reports that for last 3 days she was not able to sleep feeling more agitated biting her self and was more aggressive then her baseline. Today HHA noted her to have jerking movement of her arms and had so she was  brought to hospital. Patient was awake during episode, no foaming, tongue bite, urinary or bladder incontinence noted during episode. HHA also reports that she was eating, drinking. Reports being on sodium supplement. Denies nausea, vomiting, diarrhea, abdominal pain, SOB, chest pain, HOLLOWAY, palpitations, dizziness, headache, cough, wheezing, joint pain or swelling, fever, chills.     ED course:  Vitals: 133/101-> 89/48, 56, 20 (98)  Labs pertinent for WBC of 29.28  Na level of  184, Cl of 150  BUN/Cr of 63/3.01 (baseline of 27/1.15)  ProBNP of 750  lactate level of 4.7-> 1.4  chest xray: left blunting   S/p zosyn and levoflox was given  NS 1800 cc bolus was given and started on NS@125  ativan and haldol was given to calm patient     Pt. seen and examined, HHA at bedside.  Pt. mostly asleep with periods of agitation when awake.  As per HHA pt. still able to verbalize and "curse".  Pt.'s daughter visiting in pm, discussed GOC with her.  She is in agreement with palliative care, consult requested.

## 2019-05-20 NOTE — SWALLOW BEDSIDE ASSESSMENT ADULT - ASR SWALLOW REFERRAL
Registered Dietitian/Pt is likely to not meet nutrition & hydration needs by PO alone. Malnutrition risk present. 3) Discussed results and recommendations with

## 2019-05-20 NOTE — SWALLOW BEDSIDE ASSESSMENT ADULT - ORAL PREPARATORY PHASE
reduced bolus formation/Anterior loss of bolus/Reduced oral grading/Bolus falls into anterior sulcus Anterior loss of bolus/Bolus falls into anterior sulcus/Reduced oral grading Anterior loss of bolus/Reduced oral grading/Bolus falls into anterior sulcus/Bolus falls into right lateral sulci

## 2019-05-20 NOTE — SWALLOW BEDSIDE ASSESSMENT ADULT - PHARYNGEAL PHASE
Delayed pharyngeal swallow/Wet vocal quality post oral intake/Decreased laryngeal elevation/Multiple swallows Delayed cough post oral intake/Cough post oral intake/Multiple swallows/Decreased laryngeal elevation/Delayed pharyngeal swallow/Wet vocal quality post oral intake Multiple swallows/Delayed pharyngeal swallow/Decreased laryngeal elevation/Wet vocal quality post oral intake

## 2019-05-20 NOTE — SWALLOW BEDSIDE ASSESSMENT ADULT - SWALLOW EVAL: RECOMMENDED FEEDING/EATING TECHNIQUES
allow for swallow between intakes/oral hygiene/small sips/bites/maintain upright posture during/after eating for 30 mins/no straws/position upright (90 degrees)/check mouth frequently for oral residue/pocketing/crush medication (when feasible)/alternate food with liquid

## 2019-05-20 NOTE — SWALLOW BEDSIDE ASSESSMENT ADULT - ADDITIONAL RECOMMENDATIONS
Defer to medical team with considerations of Pt's advanced age for tube feeding vs comfort feedings. Consider Palliative Care consult to determine the GOC with re: to provision of nutrition in this patient.

## 2019-05-20 NOTE — SWALLOW BEDSIDE ASSESSMENT ADULT - ORAL PHASE
Decreased anterior-posterior movement of the bolus/Delayed oral transit time Lingual stasis/Delayed oral transit time/Decreased anterior-posterior movement of the bolus/Stasis in anterior sulcus Delayed oral transit time/Decreased anterior-posterior movement of the bolus/Stasis in anterior sulcus/Lingual stasis

## 2019-05-20 NOTE — PROGRESS NOTE ADULT - ASSESSMENT
Hypernatremia  Sodium improved , 156 last plasma level.  Creatinine with mild improvement    Continue IV fluids, D5W 100 ml/hour.    Follow up with speech and swallow

## 2019-05-20 NOTE — PROGRESS NOTE ADULT - SUBJECTIVE AND OBJECTIVE BOX
NP Note discussed with  Primary Attending    Patient is a 89y old  Female who presents with a chief complaint of weakness (20 May 2019 11:28)      INTERVAL HPI/OVERNIGHT EVENTS: no new complaints    MEDICATIONS  (STANDING):  dextrose 5%. 1000 milliLiter(s) (50 mL/Hr) IV Continuous <Continuous>  heparin  Injectable 5000 Unit(s) SubCutaneous every 12 hours  mirtazapine 15 milliGRAM(s) Oral at bedtime    MEDICATIONS  (PRN):      __________________________________________________  REVIEW OF SYSTEMS:    CONSTITUTIONAL: No fever,   EYES: no acute visual disturbances  NECK: No pain or stiffness  RESPIRATORY: No cough; No shortness of breath  CARDIOVASCULAR: No chest pain, no palpitations  GASTROINTESTINAL: No pain. No nausea or vomiting; No diarrhea   NEUROLOGICAL: No headache or numbness, no tremors  MUSCULOSKELETAL: No joint pain, no muscle pain  GENITOURINARY: no dysuria, no frequency, no hesitancy  PSYCHIATRY: no depression , no anxiety  ALL OTHER  ROS negative        Vital Signs Last 24 Hrs  T(C): 36.6 (20 May 2019 14:46), Max: 36.6 (20 May 2019 04:54)  T(F): 97.9 (20 May 2019 14:46), Max: 97.9 (20 May 2019 14:46)  HR: 73 (20 May 2019 14:46) (60 - 74)  BP: 106/52 (20 May 2019 14:46) (106/52 - 118/42)  BP(mean): --  RR: 17 (20 May 2019 14:46) (17 - 18)  SpO2: 98% (20 May 2019 14:46) (97% - 98%)    ________________________________________________  PHYSICAL EXAM:  GENERAL: NAD  HEENT: Normocephalic;  conjunctivae and sclerae clear; moist mucous membranes;   NECK : supple  CHEST/LUNG: Clear to auscultation bilaterally with good air entry   HEART: S1 S2  regular; no murmurs, gallops or rubs  ABDOMEN: Soft, Nontender, Nondistended; Bowel sounds present  EXTREMITIES: no cyanosis; no edema; no calf tenderness  SKIN: warm and dry; no rash  NERVOUS SYSTEM:  Awake and alert; Oriented  to place, person and time ; no new deficits    _________________________________________________  LABS:                        11.8   34.04 )-----------( 59       ( 20 May 2019 07:48 )             38.4     05-20    156<H>  |  124<H>  |  31<H>  ----------------------------<  99  3.5   |  21<L>  |  2.25<H>    Ca    8.0<L>      20 May 2019 07:48  Phos  2.6     05-20  Mg     2.2     05-20    TPro  4.9<L>  /  Alb  2.4<L>  /  TBili  1.1  /  DBili  0.3<H>  /  AST  79<H>  /  ALT  33  /  AlkPhos  128<H>  05-19        CAPILLARY BLOOD GLUCOSE      POCT Blood Glucose.: 111 mg/dL (19 May 2019 20:58)  POCT Blood Glucose.: 114 mg/dL (19 May 2019 16:48)        RADIOLOGY & ADDITIONAL TESTS:    CT Head No Cont (05.16.19 @ 16:47) >  EXAM:  CT BRAIN                            PROCEDURE DATE:  05/16/2019          INTERPRETATION:  History: 89-year-old patient with history of weakness   and degenerative movements.    Findings:    Comparison study: CT of the brain from September 27, 2018.    No extra-axial collection is noted. No mass effect, midline shift, or   intracranial hemorrhage is noted. Moderate atrophy is noted.    Impression:    No intracranial hemorrhage or shift.              CT Chest No Cont (05.16.19 @ 23:42) >  EXAM:  CT CHEST                            PROCEDURE DATE:  05/16/2019          INTERPRETATION:  Chest CT without IV contrast    Indication: Persistent perihilar opacity.    Technique: Axial multidetector CT images of the chest are acquired   without IV contrast.    Comparison: None.    Findings: Trace left pleural effusion. No pericardial effusion. Mild   cardiomegaly. Aortic, coronary artery and mitral annular calcifications   are present. No aortic aneurysm. Small collapsed hiatal hernia versus   mild distal esophageal mural thickening. Multiple lesions in the thyroid   bilaterally with the largest measuring up to 1.5 cm on the right.   Allowing for the noncontrast technique, there is no grossly enlarged   mediastinal, hilar, or axillarylymph node. The trachea and central   bronchi appear grossly patent.    Mild bilateral atelectasis. No evidence for pulmonary consolidation. In   the superior segment of the right lower lobe, there is a 2.6 x 3.9 cm   lung mass with calcifications. This may represent a calcified granuloma,   lung cancer with engulfed calcified granuloma, or metastasis with   calcification.    Limited sections through the upper abdomen demonstrate cholelithiasis   with mild pericholecystic fluid. Stranding is noted adjacent to the   gallbladder, duodenum and pancreas. Findings may represent acute   cholecystitis, nonspecific duodenitis, and/or acute pancreatitis.   Clinical correlation is recommended. Small ascites. Nonspecific mild left   adrenal thickening.    Stable old compression fracture at T11 vertebra.    Impression: Trace left pleural effusion.    Small collapsed hiatal hernia versus mild distal esophageal mural   thickening. If clinically indicated, EGD may be pursued for further   evaluation.    Multiple lesions in the thyroid bilaterally with the largest measuring up   to 1.5 cm on the right. Thyroid ultrasound may be pursued for further   evaluation.    In the superior segment of the right lower lobe, there is a 2.6 x 3.9 cm   lung mass with calcifications. This may represent a calcified granuloma,   lung cancer with engulfed calcified granuloma, or metastasis with   calcification.    Cholelithiasis with mild pericholecystic fluid. Stranding adjacent to the   gallbladder, duodenum and pancreas. Findings may represent acute   cholecystitis, nonspecific duodenitis, and/or acute pancreatitis.   Clinical correlation is recommended. If clinically indicated, dedicated   abdominal/pelvic CT with IV and oral contrast may be pursued for further   evaluation.    Small ascites.     < end of copied text >          US Urinary Bladder (05.19.19 @ 18:58) >  EXAM:  US URINARY BLADDER                        EXAM:  US KIDNEY(S)                            PROCEDURE DATE:  05/19/2019          INTERPRETATION:  Clinical indication: Acute renal failure. Patient has a   Cunha catheter.    Technique: Ultrasound of the kidneys and bladder was performed.      Comparison: Chest CT 5/16/2019. Abdominal CT 2/1/2018.    Findings: This study was technically difficult due to patient's inability   to cooperate.    Right kidney: Measures 8.0 cm in length. No hydronephrosis or obvious   renal stone. Diffusely increased echogenicity, reflecting parenchymal   disease.  Left kidney: Measures 7.9 cm in length. No hydronephrosis or obvious   renal stone. Diffusely increased echogenicity, reflecting parenchymal   disease.  Bladder: Not visualized, limiting evaluation.  Additional: Again noted, cholelithiasis, pericholecystic/perihepatic   fluid and borderline gallbladder wall thickening (0.3 cm).    Impression:      No hydronephrosis or obvious renal stone. Additional findings as   described.    Cholelithiasis, borderline gallbladder wall thickening and   pericholecystic/perihepatic fluid. It is unclear whether the patient was   premedicated. Recommend clinical correlation to assess acute   cholecystitis.    < end of copied text >          < end of copied text >      Imaging Personally Reviewed:  YES/NO    Consultant(s) Notes Reviewed:   YES/ No    Care Discussed with Consultants :     Plan of care was discussed with patient and /or primary care giver; all questions and concerns were addressed and care was aligned with patient's wishes.

## 2019-05-20 NOTE — PROGRESS NOTE ADULT - SUBJECTIVE AND OBJECTIVE BOX
Patient is a 89y old  Female who presents with a chief complaint of weakness (19 May 2019 10:42)    pt seen in icu [  ], reg med floor [x   ], bed [x  ], chair at bedside [   ], awake and responsive [ x ], lethargic [  ],  nad [ x ]      Allergies    No Known Allergies        Vitals    T(F): 97.8 (05-20-19 @ 04:54), Max: 98.8 (05-19-19 @ 14:33)  HR: 74 (05-20-19 @ 04:54) (60 - 74)  BP: 108/51 (05-20-19 @ 04:54) (108/51 - 118/44)  RR: 18 (05-20-19 @ 04:54) (18 - 18)  SpO2: 98% (05-20-19 @ 04:54) (97% - 98%)  Wt(kg): --  CAPILLARY BLOOD GLUCOSE      POCT Blood Glucose.: 111 mg/dL (19 May 2019 20:58)      Labs                          11.8   34.04 )-----------( x        ( 20 May 2019 07:48 )             38.4       05-20    156<H>  |  124<H>  |  31<H>  ----------------------------<  99  3.5   |  21<L>  |  2.25<H>    Ca    8.0<L>      20 May 2019 07:48  Phos  2.6     05-20  Mg     2.2     05-20    TPro  4.9<L>  /  Alb  2.4<L>  /  TBili  1.1  /  DBili  0.3<H>  /  AST  79<H>  /  ALT  33  /  AlkPhos  128<H>  05-19            .Blood  05-17 @ 00:36   No growth to date.  --  --          Radiology Results      < from: US Renal (05.19.19 @ 18:58) >    Impression:      No hydronephrosis or obvious renal stone. Additional findings as   described.    Cholelithiasis, borderline gallbladder wall thickening and   pericholecystic/perihepatic fluid. It is unclear whether the patient was   premedicated. Recommend clinical correlation to assess acute   cholecystitis.    < end of copied text >        < from: US Urinary Bladder (05.19.19 @ 18:58) >  Impression:      No hydronephrosis or obvious renal stone. Additional findings as   described.    Cholelithiasis, borderline gallbladder wall thickening and   pericholecystic/perihepatic fluid. It is unclear whether the patient was   premedicated. Recommend clinical correlation to assess acute   cholecystitis.    < end of copied text >        Meds    MEDICATIONS  (STANDING):  dextrose 5%. 1000 milliLiter(s) (50 mL/Hr) IV Continuous <Continuous>  heparin  Injectable 5000 Unit(s) SubCutaneous every 12 hours  mirtazapine 15 milliGRAM(s) Oral at bedtime      MEDICATIONS  (PRN):      Physical Exam      Neuro :  no focal deficits  Respiratory: CTA B/L  CV: RRR, S1S2, no murmurs,   Abdominal: Soft, NT, ND +BS,  Extremities: No edema, + peripheral pulses      ASSESSMENT      uti,   hypernatremia,    encephalopathy,  transaminitis,   damon,   cholelithiasis   h/o   Dementia  Hypertension  Depression  HTN (hypertension)  CLL (chronic lymphocytic leukemia) with elevated wbc   Femur fracture, right      PLAN      cont rocephin 1 g  daily x 7 days  blood cx neg noted above  f/u ucx  renal f/u.  serum sodium and creat improving  Continue hypotonic solution D5W, AT A RATE OF 50 ML/HOUR, ALLOWED 12 MEQ CHANGE IN 24 HOURS.  Follow sodium q 8 hours and adjust fluid intake as need  po4 and mg wnl noted above   DAMON with mild improvement  Obtain renal and bladder US for further evaluation  cont current meds Patient is a 89y old  Female who presents with a chief complaint of weakness (19 May 2019 10:42)    pt seen in icu [  ], reg med floor [x   ], bed [x  ], chair at bedside [   ], awake and responsive [ x ], lethargic [  ],  nad [ x ]      Allergies    No Known Allergies        Vitals    T(F): 97.8 (05-20-19 @ 04:54), Max: 98.8 (05-19-19 @ 14:33)  HR: 74 (05-20-19 @ 04:54) (60 - 74)  BP: 108/51 (05-20-19 @ 04:54) (108/51 - 118/44)  RR: 18 (05-20-19 @ 04:54) (18 - 18)  SpO2: 98% (05-20-19 @ 04:54) (97% - 98%)  Wt(kg): --  CAPILLARY BLOOD GLUCOSE      POCT Blood Glucose.: 111 mg/dL (19 May 2019 20:58)      Labs                          11.8   34.04 )-----------( x        ( 20 May 2019 07:48 )             38.4       05-20    156<H>  |  124<H>  |  31<H>  ----------------------------<  99  3.5   |  21<L>  |  2.25<H>    Ca    8.0<L>      20 May 2019 07:48  Phos  2.6     05-20  Mg     2.2     05-20    TPro  4.9<L>  /  Alb  2.4<L>  /  TBili  1.1  /  DBili  0.3<H>  /  AST  79<H>  /  ALT  33  /  AlkPhos  128<H>  05-19            .Blood  05-17 @ 00:36   No growth to date.  --  --          Radiology Results      < from: US Renal (05.19.19 @ 18:58) >    Impression:      No hydronephrosis or obvious renal stone. Additional findings as   described.    Cholelithiasis, borderline gallbladder wall thickening and   pericholecystic/perihepatic fluid. It is unclear whether the patient was   premedicated. Recommend clinical correlation to assess acute   cholecystitis.    < end of copied text >        < from: US Urinary Bladder (05.19.19 @ 18:58) >  Impression:      No hydronephrosis or obvious renal stone. Additional findings as   described.    Cholelithiasis, borderline gallbladder wall thickening and   pericholecystic/perihepatic fluid. It is unclear whether the patient was   premedicated. Recommend clinical correlation to assess acute   cholecystitis.    < end of copied text >        Meds    MEDICATIONS  (STANDING):  dextrose 5%. 1000 milliLiter(s) (50 mL/Hr) IV Continuous <Continuous>  heparin  Injectable 5000 Unit(s) SubCutaneous every 12 hours  mirtazapine 15 milliGRAM(s) Oral at bedtime      MEDICATIONS  (PRN):      Physical Exam      Neuro :  no focal deficits  Respiratory: CTA B/L  CV: RRR, S1S2, no murmurs,   Abdominal: Soft, NT, ND +BS,  Extremities: No edema, + peripheral pulses      ASSESSMENT      uti,   hypernatremia,    encephalopathy,  transaminitis,   damon,   cholelithiasis   h/o   Dementia  Hypertension  Depression  HTN (hypertension)  CLL (chronic lymphocytic leukemia) with elevated wbc   Femur fracture, right      PLAN      cont rocephin 1 g  daily x 7 days  blood cx neg noted above  f/u ucx  renal f/u.  serum sodium and creat improving  Continue hypotonic solution D5W, AT A RATE OF 50 ML/HOUR, ALLOWED 12 MEQ CHANGE IN 24 HOURS.  Follow sodium q 8 hours and adjust fluid intake as need  po4 and mg wnl noted above   DAMON with mild improvement  Obtain renal and bladder US for further evaluation  palliative eval   swallow eval   cont current meds

## 2019-05-20 NOTE — SWALLOW BEDSIDE ASSESSMENT ADULT - ASR SWALLOW ASPIRATION MONITOR
throat clearing/position upright (89Y)/gurgly voice/oral hygiene/pneumonia/change of breathing pattern/cough/fever/upper respiratory infection

## 2019-05-20 NOTE — PROGRESS NOTE ADULT - SUBJECTIVE AND OBJECTIVE BOX
No release of information form on file for patient's mother.   I called patient regarding medications, left VM asking for call back if there are still questions or concerns.    Chief complain/HPI  Hypernatremia  DAMON  Dehydration due to diuretics and reduced po intake the last few days, due to OMS.  Severe dementia    PAST MEDICAL & SURGICAL HISTORY:  CHF (congestive heart failure)  UTI (urinary tract infection)  Dementia  Hypertension  Depression  HTN (hypertension)  CLL (chronic lymphocytic leukemia)  Femur fracture, right      Home Medications Reviewed    Hospital Medications:   MEDICATIONS  (STANDING):  dextrose 5%. 1000 milliLiter(s) (50 mL/Hr) IV Continuous <Continuous>  heparin  Injectable 5000 Unit(s) SubCutaneous every 12 hours  mirtazapine 15 milliGRAM(s) Oral at bedtime    MEDICATIONS  (PRN):      Allergies    No Known Allergies    Intolerances                              11.8   34.04 )-----------( x        ( 20 May 2019 07:48 )             38.4     05-20    156<H>  |  124<H>  |  31<H>  ----------------------------<  99  3.5   |  21<L>  |  2.25<H>    Ca    8.0<L>      20 May 2019 07:48  Phos  2.6     05-20  Mg     2.2     05-20    TPro  4.9<L>  /  Alb  2.4<L>  /  TBili  1.1  /  DBili  0.3<H>  /  AST  79<H>  /  ALT  33  /  AlkPhos  128<H>  05-19        Creatinine, Random Urine: 49 mg/dL (05-17 @ 01:39)  Sodium, Random Urine: 131 mmol/L (05-17 @ 01:39)  Osmolality, Random Urine: 579 mosm/Kg (05-17 @ 01:39)  Potassium, Random Urine: 57 mmol/L (05-17 @ 01:39)        RADIOLOGY & ADDITIONAL STUDIES:    SOCIAL HISTORY: Denies ETOh,Smoking,     FAMILY HISTORY:  No pertinent family history in first degree relatives      REVIEW OF SYSTEMS:  SEVERE DEMENTIA    VITALS:  Vital Signs Last 24 Hrs  T(C): 36.6 (20 May 2019 04:54), Max: 37.1 (19 May 2019 14:33)  T(F): 97.8 (20 May 2019 04:54), Max: 98.8 (19 May 2019 14:33)  HR: 74 (20 May 2019 04:54) (60 - 74)  BP: 108/51 (20 May 2019 04:54) (108/51 - 118/44)  BP(mean): --  RR: 18 (20 May 2019 04:54) (18 - 18)  SpO2: 98% (20 May 2019 04:54) (97% - 98%)    05-19 @ 07:01  -  05-20 @ 07:00  --------------------------------------------------------  IN: 0 mL / OUT: 450 mL / NET: -450 mL          PHYSICAL EXAM:  Constitutional: NAD, agitated.    Respiratory: NOT COOPERATIVE TODAY  Cardiovascular: S1, S2, RRR, no pericardial rub, no murmur  Gastrointestinal: BS+, soft, no tenderness, no distension, no bruit  Pelvis: bladder non-distended, no CVA tenderness  Extremities: No cyanosis or clubbing. No peripheral edema  Neurological: non communicative and agitated  Spit the food

## 2019-05-20 NOTE — SWALLOW BEDSIDE ASSESSMENT ADULT - SWALLOW EVAL: RECOMMENDED DIET
dysphagia puree diet with honey thick liquids SPOON FEED HALF TEASPOON FOR ALL, as tolerated, for comfort feeding.

## 2019-05-20 NOTE — SWALLOW BEDSIDE ASSESSMENT ADULT - SWALLOW EVAL: DIAGNOSIS
Pt p/w s&s oropharyngeal dysphagia c/b poor labial seal, weak bolus formation, slow oral transit, oral pooling & stasis, base-of-tongue pumping, delayed swallow reflex, reduced hyolaryngeal elevation, delayedCough & suspected penetration/aspiration of secretions seen at this exam. Suspected premature spillage of bolus to the hypopharynx. Pt is likely to aspirate on combination of oral residue and saliva (thin liquid).

## 2019-05-21 DIAGNOSIS — N17.9 ACUTE KIDNEY FAILURE, UNSPECIFIED: ICD-10-CM

## 2019-05-21 DIAGNOSIS — E43 UNSPECIFIED SEVERE PROTEIN-CALORIE MALNUTRITION: ICD-10-CM

## 2019-05-21 DIAGNOSIS — F03.90 UNSPECIFIED DEMENTIA WITHOUT BEHAVIORAL DISTURBANCE: ICD-10-CM

## 2019-05-21 DIAGNOSIS — R53.2 FUNCTIONAL QUADRIPLEGIA: ICD-10-CM

## 2019-05-21 DIAGNOSIS — Z51.5 ENCOUNTER FOR PALLIATIVE CARE: ICD-10-CM

## 2019-05-21 LAB
ANION GAP SERPL CALC-SCNC: 9 MMOL/L — SIGNIFICANT CHANGE UP (ref 5–17)
BUN SERPL-MCNC: 26 MG/DL — HIGH (ref 7–18)
CALCIUM SERPL-MCNC: 7.6 MG/DL — LOW (ref 8.4–10.5)
CHLORIDE SERPL-SCNC: 119 MMOL/L — HIGH (ref 96–108)
CO2 SERPL-SCNC: 21 MMOL/L — LOW (ref 22–31)
CREAT SERPL-MCNC: 2.01 MG/DL — HIGH (ref 0.5–1.3)
GLUCOSE SERPL-MCNC: 94 MG/DL — SIGNIFICANT CHANGE UP (ref 70–99)
HCT VFR BLD CALC: 36.6 % — SIGNIFICANT CHANGE UP (ref 34.5–45)
HGB BLD-MCNC: 11.6 G/DL — SIGNIFICANT CHANGE UP (ref 11.5–15.5)
MCHC RBC-ENTMCNC: 31.7 GM/DL — LOW (ref 32–36)
MCHC RBC-ENTMCNC: 34.7 PG — HIGH (ref 27–34)
MCV RBC AUTO: 109.6 FL — HIGH (ref 80–100)
NRBC # BLD: 0 /100 WBCS — SIGNIFICANT CHANGE UP (ref 0–0)
PLATELET # BLD AUTO: 65 K/UL — LOW (ref 150–400)
POTASSIUM SERPL-MCNC: 3.4 MMOL/L — LOW (ref 3.5–5.3)
POTASSIUM SERPL-SCNC: 3.4 MMOL/L — LOW (ref 3.5–5.3)
RBC # BLD: 3.34 M/UL — LOW (ref 3.8–5.2)
RBC # FLD: 14.6 % — HIGH (ref 10.3–14.5)
SODIUM SERPL-SCNC: 149 MMOL/L — HIGH (ref 135–145)
WBC # BLD: 37.63 K/UL — HIGH (ref 3.8–10.5)
WBC # FLD AUTO: 37.63 K/UL — HIGH (ref 3.8–10.5)

## 2019-05-21 PROCEDURE — 99497 ADVNCD CARE PLAN 30 MIN: CPT | Mod: 25

## 2019-05-21 PROCEDURE — 99223 1ST HOSP IP/OBS HIGH 75: CPT

## 2019-05-21 RX ORDER — SODIUM CHLORIDE 9 MG/ML
1000 INJECTION, SOLUTION INTRAVENOUS
Refills: 0 | Status: DISCONTINUED | OUTPATIENT
Start: 2019-05-21 | End: 2019-05-21

## 2019-05-21 RX ORDER — DEXTROSE MONOHYDRATE, SODIUM CHLORIDE, AND POTASSIUM CHLORIDE 50; .745; 4.5 G/1000ML; G/1000ML; G/1000ML
1000 INJECTION, SOLUTION INTRAVENOUS
Refills: 0 | Status: DISCONTINUED | OUTPATIENT
Start: 2019-05-21 | End: 2019-05-24

## 2019-05-21 RX ADMIN — DEXTROSE MONOHYDRATE, SODIUM CHLORIDE, AND POTASSIUM CHLORIDE 60 MILLILITER(S): 50; .745; 4.5 INJECTION, SOLUTION INTRAVENOUS at 20:30

## 2019-05-21 RX ADMIN — HEPARIN SODIUM 5000 UNIT(S): 5000 INJECTION INTRAVENOUS; SUBCUTANEOUS at 05:42

## 2019-05-21 RX ADMIN — HEPARIN SODIUM 5000 UNIT(S): 5000 INJECTION INTRAVENOUS; SUBCUTANEOUS at 17:08

## 2019-05-21 RX ADMIN — SODIUM CHLORIDE 50 MILLILITER(S): 9 INJECTION, SOLUTION INTRAVENOUS at 14:35

## 2019-05-21 NOTE — CONSULT NOTE ADULT - PROBLEM SELECTOR RECOMMENDATION 2
Advanced, nonambulatory, dependent on ADLs, no behavioral issues currently.  FAST 7e. Patient meets criteria for hospice 2/2 dementia.  Educated patient's daughters on clinical course of dementia  Family is agreeable for home with hospice. Discussed with .

## 2019-05-21 NOTE — PROGRESS NOTE ADULT - PROBLEM SELECTOR PLAN 8
Met with pt.'s daughter who will meet with palliative care tomorrow.
Met with pt.'s daughter who will meet with palliative care tomorrow.  5/21  -Palliative team met with family  -Pt. for home hospice care with family's full understanding and agreement

## 2019-05-21 NOTE — PROGRESS NOTE ADULT - ASSESSMENT
DAMON and hypernatremia  Renal function and sodium imprving  Low k  Reduced po intake  Continue IV fluids and add 20 MEQ OF k TO FLUIDS.  Follow PO4 level daily

## 2019-05-21 NOTE — CONSULT NOTE ADULT - PROBLEM SELECTOR RECOMMENDATION 4
Bedbound, nonverbal.  Requires complete assistance with all ADL's. Bedbound, nonverbal.  Requires complete assistance with all ADL's. Freq positioning.

## 2019-05-21 NOTE — PROGRESS NOTE ADULT - PROBLEM SELECTOR PLAN 10
IMPROVE VTE Individual Risk Assessment          RISK                                                          Points  [  ] Previous VTE                                                3  [  ] Thrombophilia                                             2  [  ] Lower limb paralysis                                   2        (unable to hold up >15 seconds)    [  ] Current Cancer                                             2         (within 6 months)  [  ] Immobilization > 24 hrs                              1  [  ] ICU/CCU stay > 24 hours                             1  [  ] Age > 60                                                         1    IMPROVE VTE Score: 2  started on heparin subq
IMPROVE VTE Individual Risk Assessment          RISK                                                          Points  [  ] Previous VTE                                                3  [  ] Thrombophilia                                             2  [  ] Lower limb paralysis                                   2        (unable to hold up >15 seconds)    [  ] Current Cancer                                             2         (within 6 months)  [  ] Immobilization > 24 hrs                              1  [  ] ICU/CCU stay > 24 hours                             1  [  ] Age > 60                                                         1    IMPROVE VTE Score: 2  started on heparin subq

## 2019-05-21 NOTE — PROGRESS NOTE ADULT - PROBLEM SELECTOR PLAN 9
Pt. is too lethargic for feeding  -s/s->puree with honey consistency liquids  -May need PEG placement pending d/w family
Pt. is too lethargic for feeding  -s/s->puree with honey consistency liquids  -May need PEG placement pending d/w family  5/21  -Family declined TF via PEG option  -Cont comfort feeding and IVF

## 2019-05-21 NOTE — PROGRESS NOTE ADULT - PROBLEM SELECTOR PLAN 2
Encephalopathy likely 2/2 Hypernatremia vs. Advanced age  -Head CT with no acute findings, mod atrophy noted  -Cont electrolytes monitoring/correcting
Encephalopathy likely 2/2 Hypernatremia vs. Advanced age  -Head CT with no acute findings, mod atrophy noted  -Cont electrolytes monitoring/correcting  5/21  -Remains lethargic refusing to eat now  -Cont IVF

## 2019-05-21 NOTE — PROGRESS NOTE ADULT - SUBJECTIVE AND OBJECTIVE BOX
Problem List:    PAST MEDICAL & SURGICAL HISTORY:  CHF (congestive heart failure)  UTI (urinary tract infection)  Dementia  Hypertension  Depression  HTN (hypertension)  CLL (chronic lymphocytic leukemia)  Femur fracture, right      No Known Allergies      MEDICATIONS  (STANDING):  dextrose 5%. 1000 milliLiter(s) (50 mL/Hr) IV Continuous <Continuous>  heparin  Injectable 5000 Unit(s) SubCutaneous every 12 hours  mirtazapine 15 milliGRAM(s) Oral at bedtime    MEDICATIONS  (PRN):                            11.6   37.63 )-----------( 65       ( 21 May 2019 07:26 )             36.6     05-21    149<H>  |  119<H>  |  26<H>  ----------------------------<  94  3.4<L>   |  21<L>  |  2.01<H>    Ca    7.6<L>      21 May 2019 07:26  Phos  2.6     05-20  Mg     2.2     05-20          Creatinine, Random Urine: 49 mg/dL (05-17 @ 01:39)  Sodium, Random Urine: 131 mmol/L (05-17 @ 01:39)  Osmolality, Random Urine: 579 mosm/Kg (05-17 @ 01:39)  Potassium, Random Urine: 57 mmol/L (05-17 @ 01:39)      REVIEW OF SYSTEMS:  General: no fever no chills, no weight loss.  EYES/ENT: No visual changes;  No vertigo, no headache.  NECK: No pain or stiffness  RESPIRATORY: No cough, wheezing, hemoptysis; No shortness of breath  CARDIOVASCULAR: No chest pain or palpitations. No Edema  GASTROINTESTINAL: No abdominal or epigastric pain. No nausea, vomiting. No diarrhea or constipation. No melena.  GENITOURINARY: No dysuria, frequency, foamy urine, urinary urgency, incontinence or hematuria  NEUROLOGICAL: No numbness or weakness, no tremor , no dizziness.   Muscle skeletal : no joint pain and no swelling of joints and limbs.  SKIN: No itching, burning, rashes.        VITALS:  T(F): 97.6 (05-21-19 @ 05:14), Max: 98 (05-20-19 @ 20:45)  HR: 77 (05-21-19 @ 05:14)  BP: 106/86 (05-21-19 @ 05:14)  RR: 18 (05-21-19 @ 05:14)  SpO2: 95% (05-21-19 @ 05:14)  Wt(kg): --    05-20 @ 07:01  -  05-21 @ 07:00  --------------------------------------------------------  IN: 0 mL / OUT: 385 mL / NET: -385 mL        PHYSICAL EXAM:  Constitutional: well developed, no diaphoresis, no distress.  Neck: No JVD, no carotid bruit, supple, no adenopathy  Respiratory: Good air entrance B/L, no wheezes, rales or rhonchi  Cardiovascular: S1, S2, RRR, no pericardial rub, no murmur  Abdomen: BS+, soft, no tenderness, no bruit  Pelvis: bladder nondistended  Extremities: No cyanosis or clubbing. No peripheral edema.   Pulses: All present  Neurological: A/O x 3, no focal deficits  Psychiatric: Normal mood, normal affect  Skin: No rashes  Vascular Access: Problem List:  Hypernatremia  DAMON    PAST MEDICAL & SURGICAL HISTORY:  CHF (congestive heart failure)  UTI (urinary tract infection)  Dementia  Hypertension  Depression  HTN (hypertension)  CLL (chronic lymphocytic leukemia)  Femur fracture, right      No Known Allergies      MEDICATIONS  (STANDING):  dextrose 5%. 1000 milliLiter(s) (50 mL/Hr) IV Continuous <Continuous>  heparin  Injectable 5000 Unit(s) SubCutaneous every 12 hours  mirtazapine 15 milliGRAM(s) Oral at bedtime    MEDICATIONS  (PRN):                            11.6   37.63 )-----------( 65       ( 21 May 2019 07:26 )             36.6     05-21    149<H>  |  119<H>  |  26<H>  ----------------------------<  94  3.4<L>   |  21<L>  |  2.01<H>    Ca    7.6<L>      21 May 2019 07:26  Phos  2.6     05-20  Mg     2.2     05-20    Albumin, Serum: 2.4 g/dL (05.19.19 @ 00:21)          Creatinine, Random Urine: 49 mg/dL (05-17 @ 01:39)  Sodium, Random Urine: 131 mmol/L (05-17 @ 01:39)  Osmolality, Random Urine: 579 mosm/Kg (05-17 @ 01:39)  Potassium, Random Urine: 57 mmol/L (05-17 @ 01:39)      REVIEW OF SYSTEMS:  SEVERE DEMENTIA        VITALS:  T(F): 97.6 (05-21-19 @ 05:14), Max: 98 (05-20-19 @ 20:45)  HR: 77 (05-21-19 @ 05:14)  BP: 106/86 (05-21-19 @ 05:14)  RR: 18 (05-21-19 @ 05:14)  SpO2: 95% (05-21-19 @ 05:14)  Wt(kg): --    05-20 @ 07:01  -  05-21 @ 07:00  --------------------------------------------------------  IN: 0 mL / OUT: 385 mL / NET: -385 mL        PHYSICAL EXAM:  Constitutional: no diaphoresis, no distress.  Neck: No JVD, no carotid bruit, supple, no adenopathy  Respiratory: Good air entrance B/L, no wheezes, rales or rhonchi  Cardiovascular: S1, S2, RRR, no pericardial rub, no murmur  Abdomen: BS+, soft, no tenderness, no bruit  Pelvis: bladder nondistended  Extremities: No cyanosis or clubbing. No peripheral edema.   not follow commends

## 2019-05-21 NOTE — PROGRESS NOTE ADULT - PROBLEM SELECTOR PLAN 4
lactate level of 4.7-> 1.4  likely dehydration - improved with hydration
lactate level of 4.7-> 1.4  likely dehydration - improved with hydration

## 2019-05-21 NOTE — CONSULT NOTE ADULT - PROBLEM SELECTOR RECOMMENDATION 5
Met with the patient's daughter Gaby Helton at he bedside, discussed patient clinical status and goals of care.  She acknowledges understanding of her mother's illness.  Explained disease trajectory of dementia; reported that patient's dementia is in its final stages and renders her eligible for hospice.  Described hospice, philosophy and locations of care.  Family is interested in home hospice once the patient is stable for discharge.  MOLST drafted; DNR/DNR/DNH placed in the chart.  Spiritual services offered and accepted.  Father Erik is aware.   All questions answered.  Support provided. Met with the patient's daughter Gaby Helton at the bedside, discussed patient clinical status and goals of care.  She acknowledges understanding of her mother's illness.  Explained disease trajectory of dementia; reported that patient's dementia is in its final stages and renders her eligible for hospice.  Described hospice, philosophy and locations of care.  Family is interested in home hospice once the patient is stable for discharge.  MOLST drafted; DNR/DNR/DNH placed in the chart.  Spiritual services offered and accepted.  Father Erik is aware.   All questions answered.  Support provided. Met with the patient's daughter Gaby Helton at the bedside, discussed patient clinical status and goals of care.  She acknowledges understanding of her mother's illness.  Explained disease trajectory of dementia; reported that patient's dementia is in its final stages and renders her eligible for hospice.  Described hospice, philosophy and locations of care.  Family does not want artificial feed/PEG; they do want her to suffer as she does not have good quality of life.    Family is interested in home hospice once the patient is stable for discharge.  MOLST drafted; DNR/DNR/DNH placed in the chart.  Spiritual services offered and accepted.  Father Erik is aware.   All questions answered.  Support provided.    Advanced care planning time spent 30 minutes.

## 2019-05-21 NOTE — CONSULT NOTE ADULT - PROBLEM SELECTOR RECOMMENDATION 9
2/2 to hypernatremia due to dehydration, diuretics and reduced po intake. On IVF showing improving sodium 149 today down from 184 on admission  Nephrology following    US kidney/bladder->No hydronephrosis or obvious renal stone 2/2 to hypernatremia due to dehydration, diuretics and reduced po intake. On IVF showing improving sodium improving  Nephrology following    US kidney/bladder->No hydronephrosis or obvious renal stone

## 2019-05-21 NOTE — CONSULT NOTE ADULT - SUBJECTIVE AND OBJECTIVE BOX
HPI:  89/F from home with 24*7 HHA with PMH of CHF, CLL, dementia, depression, HTN, UTI, BIB daughter to the ED for weakness and jerking of hands and neck today without foaming at the mouth. EDUARD at bedside reports that for last 3 days she was not able to sleep feeling more agitated biting her self and was more aggressive then her baseline. Today HHA noted her to have jerking movement of her arms and had so she was  brought to hospital. Patient was awake during episode, no foaming, tongue bite, urinary or bladder incontinence noted during episode. EDUARD also reports that she was eating, drinking. Reports being on sodium supplement. Denies nausea, vomiting, diarrhea, abdominal pain, SOB, chest pain, HOLLOWAY, palpitations, dizziness, headache, cough, wheezing, joint pain or swelling, fever, chills. (17 May 2019 00:18)      PAST MEDICAL & SURGICAL HISTORY:  CHF (congestive heart failure)  UTI (urinary tract infection)  Dementia  Hypertension  Depression  HTN (hypertension)  CLL (chronic lymphocytic leukemia)  Femur fracture, right      SOCIAL HISTORY:    Admitted from:  home assisted living [facility]   Substance abuse history:              Tobacco hx:                  Alcohol hx:              Home Opioid hx:  Buddhism:                                    Preferred Language:    Surrogate/HCP/Guardian:            Phone#:    FAMILY HISTORY:  No pertinent family history in first degree relatives    Baseline ADLs (prior to admission):    Allergies    No Known Allergies    Intolerances      Present Symptoms:   Dyspnea:   Nausea/Vomiting:   Anxiety:  Depressed   Fatigue:  Loss of appetite:   Pain:                                location:          Review of Systems: [All others negative or Unable to obtain due to poor mentation]    MEDICATIONS  (STANDING):  dextrose 5%. 1000 milliLiter(s) (50 mL/Hr) IV Continuous <Continuous>  heparin  Injectable 5000 Unit(s) SubCutaneous every 12 hours  mirtazapine 15 milliGRAM(s) Oral at bedtime    MEDICATIONS  (PRN):      PHYSICAL EXAM:    Vital Signs Last 24 Hrs  T(C): 36.4 (21 May 2019 05:14), Max: 36.7 (20 May 2019 20:45)  T(F): 97.6 (21 May 2019 05:14), Max: 98 (20 May 2019 20:45)  HR: 77 (21 May 2019 05:14) (73 - 77)  BP: 106/86 (21 May 2019 05:14) (106/52 - 106/86)  BP(mean): --  RR: 18 (21 May 2019 05:14) (17 - 18)  SpO2: 95% (21 May 2019 05:14) (95% - 98%)    General: alert  oriented x ____    [lethargic distressed cachexia  nonverbal  unarousable verbal]  Karnofsky Performance Score/Palliative Performance Status Version2:     %    HEENT: normal  dry mouth  ET tube/trach oral lesions:  Lungs: comfortable tachypnea/labored breathing  excessive secretions  CV: normal  tachycardia  GI: normal  distended  tender  incontinent               PEG/NG/OG tube  constipation  last BM:   : normal  incontinent  oliguria/anuria  owens  Musculoskeletal: normal  weakness  edema             ambulatory  bedbound/wheelchair bound  Skin: normal  pressure ulcers: stage: edema: other:  Neuro: no deficits cognitive impairment dsyphagia/dysarthria paresis: other:  Oral intake ability: unable/only mouth care [minimal moderate full capability]  Diet: [NPO]    LABS:                        11.6   37.63 )-----------( 65       ( 21 May 2019 07:26 )             36.6     05-21    149<H>  |  119<H>  |  26<H>  ----------------------------<  94  3.4<L>   |  21<L>  |  2.01<H>    Ca    7.6<L>      21 May 2019 07:26  Phos  2.6     05-20  Mg     2.2     05-20          RADIOLOGY & ADDITIONAL STUDIES:    ADVANCE DIRECTIVES: HPI:  89/F from home with 24*7 HHA with PMH of CHF, CLL, dementia, depression, HTN, UTI, BIB daughter to the ED for weakness and jerking of hands and neck today without foaming at the mouth. EDUARD at bedside reports that for last 3 days she was not able to sleep feeling more agitated biting her self and was more aggressive then her baseline. Today HHA noted her to have jerking movement of her arms and had so she was  brought to hospital. Patient was awake during episode, no foaming, tongue bite, urinary or bladder incontinence noted during episode. EDUARD also reports that she was eating, drinking. Reports being on sodium supplement. Denies nausea, vomiting, diarrhea, abdominal pain, SOB, chest pain, HOLLOWAY, palpitations, dizziness, headache, cough, wheezing, joint pain or swelling, fever, chills. (17 May 2019 00:18)    Interval history: Hospital course,     PAST MEDICAL & SURGICAL HISTORY:  CHF (congestive heart failure)  UTI (urinary tract infection)  Dementia  Hypertension  Depression  HTN (hypertension)  CLL (chronic lymphocytic leukemia)  Femur fracture, right      SOCIAL HISTORY:    Admitted from:      [place]:                                    Preferred Language:    Surrogate/HCP/Guardian:            Phone#:    FAMILY HISTORY:  No pertinent family history in first degree relatives    Baseline ADLs (prior to admission):    Allergies    No Known Allergies    Intolerances      Present Symptoms:   Dyspnea:   Nausea/Vomiting:   Anxiety:  Depressed   Fatigue:  Loss of appetite:   Pain:                                location:          Review of Systems: [All others negative or Unable to obtain due to poor mentation]    MEDICATIONS  (STANDING):  dextrose 5%. 1000 milliLiter(s) (50 mL/Hr) IV Continuous <Continuous>  heparin  Injectable 5000 Unit(s) SubCutaneous every 12 hours  mirtazapine 15 milliGRAM(s) Oral at bedtime    MEDICATIONS  (PRN):      PHYSICAL EXAM:    Vital Signs Last 24 Hrs  T(C): 36.4 (21 May 2019 05:14), Max: 36.7 (20 May 2019 20:45)  T(F): 97.6 (21 May 2019 05:14), Max: 98 (20 May 2019 20:45)  HR: 77 (21 May 2019 05:14) (73 - 77)  BP: 106/86 (21 May 2019 05:14) (106/52 - 106/86)  BP(mean): --  RR: 18 (21 May 2019 05:14) (17 - 18)  SpO2: 95% (21 May 2019 05:14) (95% - 98%)    General: alert  oriented x ____    [lethargic distressed cachexia  nonverbal  unarousable verbal]  Karnofsky Performance Score/Palliative Performance Status Version2:     %    HEENT: normal  dry mouth  ET tube/trach oral lesions:  Lungs: comfortable tachypnea/labored breathing  excessive secretions  CV: normal  tachycardia  GI: normal  distended  tender  incontinent               PEG/NG/OG tube  constipation  last BM:   : normal  incontinent  oliguria/anuria  owens  Musculoskeletal: normal  weakness  edema             ambulatory  bedbound/wheelchair bound  Skin: normal  pressure ulcers: stage: edema: other:  Neuro: no deficits cognitive impairment dsyphagia/dysarthria paresis: other:  Oral intake ability: unable/only mouth care [minimal moderate full capability]  Diet: [NPO]    LABS:                        11.6   37.63 )-----------( 65       ( 21 May 2019 07:26 )             36.6     05-21    149<H>  |  119<H>  |  26<H>  ----------------------------<  94  3.4<L>   |  21<L>  |  2.01<H>    Ca    7.6<L>      21 May 2019 07:26  Phos  2.6     05-20  Mg     2.2     05-20          RADIOLOGY & ADDITIONAL STUDIES:    ADVANCE DIRECTIVES: HPI:  89/F from home with 24*7 HHA with PMH of CHF, CLL, dementia, depression, HTN, UTI, BIB daughter to the ED for weakness and jerking of hands and neck today without foaming at the mouth. HHA at bedside reports that for last 3 days she was not able to sleep feeling more agitated biting her self and was more aggressive then her baseline. Today HHA noted her to have jerking movement of her arms and had so she was  brought to hospital. Patient was awake during episode, no foaming, tongue bite, urinary or bladder incontinence noted during episode. HHA also reports that she was eating, drinking. Reports being on sodium supplement. Denies nausea, vomiting, diarrhea, abdominal pain, SOB, chest pain, HOLLOWAY, palpitations, dizziness, headache, cough, wheezing, joint pain or swelling, fever, chills. (17 May 2019 00:18)    Interval history; hospital course, persistent poor po intake.  S/p  SLP found to have dysphagia rec's nectar thick foods. Palliative care consulted to address GOC.   Family at the bedside.      PAST MEDICAL & SURGICAL HISTORY:  CHF (congestive heart failure)  UTI (urinary tract infection)  Dementia  Hypertension  Depression  HTN (hypertension)  CLL (chronic lymphocytic leukemia)  Femur fracture, right      SOCIAL HISTORY:    Admitted from:  Home has 24/7 HHA  Patient has 3 living children.  Gaby Helton is the assigned HCP.      Gnosticism: Evangelical                                   Gaby Helton  (dtr)     Phone#:  [phone]    FAMILY HISTORY:  No pertinent family history in first degree relatives    Baseline ADLs (prior to admission):  Bedbound, nonverbal    Allergies    No Known Allergies    Intolerances      Present Symptoms:    Unable to obtain due to poor mentation]    MEDICATIONS  (STANDING):  dextrose 5%. 1000 milliLiter(s) (50 mL/Hr) IV Continuous <Continuous>  heparin  Injectable 5000 Unit(s) SubCutaneous every 12 hours  mirtazapine 15 milliGRAM(s) Oral at bedtime    MEDICATIONS  (PRN):      PHYSICAL EXAM:    Vital Signs Last 24 Hrs  T(C): 36.4 (21 May 2019 05:14), Max: 36.7 (20 May 2019 20:45)  T(F): 97.6 (21 May 2019 05:14), Max: 98 (20 May 2019 20:45)  HR: 77 (21 May 2019 05:14) (73 - 77)  BP: 106/86 (21 May 2019 05:14) (106/52 - 106/86)  BP(mean): --  RR: 18 (21 May 2019 05:14) (17 - 18)  SpO2: 95% (21 May 2019 05:14) (95% - 98%)    General: Lethargic, AOx0, incomprehensible mutterings. NAD  Karnofsky Performance Score/Palliative Performance Status Version2: 30    %    HEENT: oropharynx clear  Lungs: unlabored  CV: S1S2, RRR  GI: soft, non tender  : urinating  Musculoskeletal: no edema  Skin: fragile, no rash or lesions noted  Neuro: unable to follow commands  Oral intake ability: poor po intake  Diet: Nectar thick  LABS:                        11.6   37.63 )-----------( 65       ( 21 May 2019 07:26 )             36.6     05-21    149<H>  |  119<H>  |  26<H>  ----------------------------<  94  3.4<L>   |  21<L>  |  2.01<H>    Ca    7.6<L>      21 May 2019 07:26  Phos  2.6     05-20  Mg     2.2     05-20      Albumin 2.4    RADIOLOGY & ADDITIONAL STUDIES:  Reviewed    ADVANCE DIRECTIVES: MOLST; DNR/DNI/DNH

## 2019-05-21 NOTE — PROGRESS NOTE ADULT - SUBJECTIVE AND OBJECTIVE BOX
Patient is a 89y old  Female who presents with a chief complaint of weakness (21 May 2019 10:20)      pt seen in icu [  ], reg med floor [x   ], bed [x  ], chair at bedside [   ], awake and responsive [ x ], lethargic [  ],  nad [ x ]      Allergies    No Known Allergies        Vitals    T(F): 97.6 (05-21-19 @ 05:14), Max: 98 (05-20-19 @ 20:45)  HR: 77 (05-21-19 @ 05:14) (73 - 77)  BP: 106/86 (05-21-19 @ 05:14) (106/52 - 106/86)  RR: 18 (05-21-19 @ 05:14) (17 - 18)  SpO2: 95% (05-21-19 @ 05:14) (95% - 98%)  Wt(kg): --  CAPILLARY BLOOD GLUCOSE      POCT Blood Glucose.: 111 mg/dL (19 May 2019 20:58)      Labs                          11.6   37.63 )-----------( 65       ( 21 May 2019 07:26 )             36.6       05-21    149<H>  |  119<H>  |  26<H>  ----------------------------<  94  3.4<L>   |  21<L>  |  2.01<H>    Ca    7.6<L>      21 May 2019 07:26  Phos  2.6     05-20  Mg     2.2     05-20              .Blood  05-17 @ 00:36   No growth to date.  --  --          Radiology Results  < from: US Renal and Bladder (05.19.19 @ 18:58) >  Findings: This study was technically difficult due to patient's inability   to cooperate.    Right kidney: Measures 8.0 cm in length. No hydronephrosis or obvious   renal stone. Diffusely increased echogenicity, reflecting parenchymal   disease.  Left kidney: Measures 7.9 cm in length. No hydronephrosis or obvious   renal stone. Diffusely increased echogenicity, reflecting parenchymal   disease.  Bladder: Not visualized, limiting evaluation.  Additional: Again noted, cholelithiasis, pericholecystic/perihepatic   fluid and borderline gallbladder wall thickening (0.3 cm).    Impression:      No hydronephrosis or obvious renal stone. Additional findings as   described.    Cholelithiasis, borderline gallbladder wall thickening and   pericholecystic/perihepatic fluid. It is unclear whether the patient was   premedicated. Recommend clinical correlation to assess acute   cholecystitis.    < end of copied text >                Meds    MEDICATIONS  (STANDING):  dextrose 5%. 1000 milliLiter(s) (50 mL/Hr) IV Continuous <Continuous>  heparin  Injectable 5000 Unit(s) SubCutaneous every 12 hours  mirtazapine 15 milliGRAM(s) Oral at bedtime      MEDICATIONS  (PRN):      Physical Exam    Neuro :  no focal deficits  Respiratory: CTA B/L  CV: RRR, S1S2, no murmurs,   Abdominal: Soft, NT, ND +BS,  Extremities: No edema, + peripheral pulses      ASSESSMENT    uti,   hypernatremia,    encephalopathy,  transaminitis,   damon,   cholelithiasis   h/o   Dementia  Hypertension  Depression  HTN (hypertension)  CLL (chronic lymphocytic leukemia) with elevated wbc   Femur fracture, right      PLAN    cont rocephin 1 g  daily x 7 days  blood cx neg noted above  f/u ucx  renal f/u.  Continue IV fluids, D5W 100 ml/hour.  serum sodium and creat improving  Follow sodium q 8 hours and adjust fluid intake as need  po4 and mg wnl noted above   DAMON with mild improvement  renal and bladder US noted above   palliative eval   swallow eval noted   dysphagia puree diet with honey thick liquids SPOON FEED HALF TEASPOON FOR ALL, as tolerated, for comfort feeding.  cont current meds

## 2019-05-21 NOTE — CONSULT NOTE ADULT - PROBLEM SELECTOR RECOMMENDATION 3
2/2 advanced dementia, Albumin 2.4.  Patient's family reports that patient chokes when eating pureed foods.  Educated family on taking aspiration precautions with patient, and that aspiration is seen as a part of the natural progression of dementia.  Directed them to  (www.choosingwisely.org) which discourages placement of percutaneous feeding tubes in patients with advanced dementia.  SLP recommending nectar thick foods.  Family does not want PEG

## 2019-05-21 NOTE — PROGRESS NOTE ADULT - PROBLEM SELECTOR PLAN 3
BUN/Cr of 63/3.01 (baseline of 27/1.15) likely 2/2 dehydration  -Nephro Dr. Ratliff  -Cr improved to 2.25 today  -US kidney/bladder->No hydronephrosis or obvious renal stone.
BUN/Cr of 63/3.01 (baseline of 27/1.15) likely 2/2 dehydration  -Nephro Dr. Ratliff  -Cr improved to 2.25 today  5/21  -Renal function improving with hydration  -US kidney/bladder->No hydronephrosis or obvious renal stone.

## 2019-05-21 NOTE — PROGRESS NOTE ADULT - PROBLEM SELECTOR PLAN 1
Na slowly trending down, 156 today down from 184 on admission  -Nephro Dr. Ratliff  -Cont D5W @100ml/hr  -f/u daily BMP
Na slowly trending down, 156 today down from 184 on admission  -Nephro Dr. Ratliff  -Cont D5W @100ml/hr  -f/u daily BMP  5/21  -Na 149 today  -Cont D5W @50cc/hr

## 2019-05-21 NOTE — PROGRESS NOTE ADULT - SUBJECTIVE AND OBJECTIVE BOX
NP Note discussed with  Primary Attending    Patient is a 89y old  Female who presents with a chief complaint of weakness (21 May 2019 11:59)      INTERVAL HPI/OVERNIGHT EVENTS: no new complaints    MEDICATIONS  (STANDING):  dextrose 5%. 1000 milliLiter(s) (50 mL/Hr) IV Continuous <Continuous>  dextrose 5%. 1000 milliLiter(s) (50 mL/Hr) IV Continuous <Continuous>  heparin  Injectable 5000 Unit(s) SubCutaneous every 12 hours  mirtazapine 15 milliGRAM(s) Oral at bedtime    MEDICATIONS  (PRN):      __________________________________________________  REVIEW OF SYSTEMS:    CONSTITUTIONAL: No fever,   EYES: no acute visual disturbances  NECK: No pain or stiffness  RESPIRATORY: No cough; No shortness of breath  CARDIOVASCULAR: No chest pain, no palpitations  GASTROINTESTINAL: No pain. No nausea or vomiting; No diarrhea   NEUROLOGICAL: No headache or numbness, no tremors  MUSCULOSKELETAL: No joint pain, no muscle pain  GENITOURINARY: no dysuria, no frequency, no hesitancy  PSYCHIATRY: no depression , no anxiety  ALL OTHER  ROS negative        Vital Signs Last 24 Hrs  T(C): 36.4 (21 May 2019 13:57), Max: 36.7 (20 May 2019 20:45)  T(F): 97.5 (21 May 2019 13:57), Max: 98 (20 May 2019 20:45)  HR: 73 (21 May 2019 13:57) (73 - 77)  BP: 107/45 (21 May 2019 13:57) (106/65 - 107/45)  BP(mean): --  RR: 18 (21 May 2019 13:57) (18 - 18)  SpO2: 95% (21 May 2019 13:57) (95% - 98%)    ________________________________________________  PHYSICAL EXAM:  GENERAL: NAD  HEENT: Normocephalic;  conjunctivae and sclerae clear; moist mucous membranes;   NECK : supple  CHEST/LUNG: Clear to auscultation bilaterally with good air entry   HEART: S1 S2  regular; no murmurs, gallops or rubs  ABDOMEN: Soft, Nontender, Nondistended; Bowel sounds present  EXTREMITIES: no cyanosis; no edema; no calf tenderness  SKIN: warm and dry; no rash  NERVOUS SYSTEM:  Awake and alert; Oriented  to place, person and time ; no new deficits    _________________________________________________  LABS:                        11.6   37.63 )-----------( 65       ( 21 May 2019 07:26 )             36.6     05-21    149<H>  |  119<H>  |  26<H>  ----------------------------<  94  3.4<L>   |  21<L>  |  2.01<H>    Ca    7.6<L>      21 May 2019 07:26  Phos  2.6     05-20  Mg     2.2     05-20          CAPILLARY BLOOD GLUCOSE            RADIOLOGY & ADDITIONAL TESTS:    Imaging Personally Reviewed:  YES/NO    Consultant(s) Notes Reviewed:   YES/ No    Care Discussed with Consultants :     Plan of care was discussed with patient and /or primary care giver; all questions and concerns were addressed and care was aligned with patient's wishes.

## 2019-05-22 ENCOUNTER — TRANSCRIPTION ENCOUNTER (OUTPATIENT)
Age: 84
End: 2019-05-22

## 2019-05-22 LAB
CULTURE RESULTS: SIGNIFICANT CHANGE UP
CULTURE RESULTS: SIGNIFICANT CHANGE UP
SPECIMEN SOURCE: SIGNIFICANT CHANGE UP
SPECIMEN SOURCE: SIGNIFICANT CHANGE UP

## 2019-05-22 RX ORDER — DEXTROSE MONOHYDRATE, SODIUM CHLORIDE, AND POTASSIUM CHLORIDE 50; .745; 4.5 G/1000ML; G/1000ML; G/1000ML
1000 INJECTION, SOLUTION INTRAVENOUS
Refills: 0 | Status: DISCONTINUED | OUTPATIENT
Start: 2019-05-22 | End: 2019-05-23

## 2019-05-22 RX ADMIN — HEPARIN SODIUM 5000 UNIT(S): 5000 INJECTION INTRAVENOUS; SUBCUTANEOUS at 17:39

## 2019-05-22 RX ADMIN — HEPARIN SODIUM 5000 UNIT(S): 5000 INJECTION INTRAVENOUS; SUBCUTANEOUS at 06:29

## 2019-05-22 RX ADMIN — DEXTROSE MONOHYDRATE, SODIUM CHLORIDE, AND POTASSIUM CHLORIDE 60 MILLILITER(S): 50; .745; 4.5 INJECTION, SOLUTION INTRAVENOUS at 20:30

## 2019-05-22 NOTE — DISCHARGE NOTE PROVIDER - NSDCCPCAREPLAN_GEN_ALL_CORE_FT
PRINCIPAL DISCHARGE DIAGNOSIS  Diagnosis: Severe dehydration  Assessment and Plan of Treatment: Admitted with severe dehydration associated with hypernatremia      SECONDARY DISCHARGE DIAGNOSES  Diagnosis: Cystitis  Assessment and Plan of Treatment:     Diagnosis: Renal insufficiency  Assessment and Plan of Treatment:     Diagnosis: Elevated lactic acid level  Assessment and Plan of Treatment:     Diagnosis: Hypernatremia  Assessment and Plan of Treatment: PRINCIPAL DISCHARGE DIAGNOSIS  Diagnosis: Severe dehydration  Assessment and Plan of Treatment: Admitted with severe dehydration associated with hypernatremia, hydrated with IV fluids.  Pt. unable to tolerate fluids by mouth.      SECONDARY DISCHARGE DIAGNOSES  Diagnosis: Palliative care encounter  Assessment and Plan of Treatment: Goals of care discussed with family by palliative care.  Family wants home hospice for comfort care with comfort feeds only, no artificial tube feeding.    Diagnosis: Advanced dementia  Assessment and Plan of Treatment: Mostly lethargic but when awake noted aggitated with periods of delirium.  Pt. refuses PO food at this time    Diagnosis: Cystitis  Assessment and Plan of Treatment: Resolved with IV antibiotics    Diagnosis: Renal insufficiency  Assessment and Plan of Treatment: Improved with IV fluids    Diagnosis: Hypernatremia  Assessment and Plan of Treatment: No further fevers at this time PRINCIPAL DISCHARGE DIAGNOSIS  Diagnosis: Severe dehydration  Assessment and Plan of Treatment: Admitted with severe dehydration associated with hypernatremia, hydrated with IV fluids.  Pt. unable to tolerate fluids by mouth.      SECONDARY DISCHARGE DIAGNOSES  Diagnosis: Chronic indwelling Owens catheter  Assessment and Plan of Treatment: due to advanced dementia  d/c home with home hospice   continue owens cath care    Diagnosis: Palliative care encounter  Assessment and Plan of Treatment: Goals of care discussed with family by palliative care.  Family wants home hospice for comfort care with comfort feeds only, no artificial tube feeding.    Diagnosis: Advanced dementia  Assessment and Plan of Treatment: Mostly lethargic but when awake noted aggitated with periods of delirium.  Pt. refuses PO food at this time    Diagnosis: Cystitis  Assessment and Plan of Treatment: Resolved with IV antibiotics    Diagnosis: Renal insufficiency  Assessment and Plan of Treatment: Improved with IV fluids    Diagnosis: Hypernatremia  Assessment and Plan of Treatment: No further fevers at this time

## 2019-05-22 NOTE — PROGRESS NOTE ADULT - SUBJECTIVE AND OBJECTIVE BOX
Patient is a 89y old  Female who presents with a chief complaint of weakness (22 May 2019 08:20)    pt seen in icu [  ], reg med floor [   ], bed [  ], chair at bedside [   ], a+o x3 [  ], lethargic [  ],  nad [  ]    owens [  ], ngt [  ], peg [  ], et tube [  ], cent line [  ], picc line [  ]        Allergies    No Known Allergies        Vitals    T(F): 97.9 (05-22-19 @ 05:14), Max: 98.1 (05-21-19 @ 21:12)  HR: 78 (05-22-19 @ 05:14) (71 - 78)  BP: 119/47 (05-22-19 @ 05:14) (104/83 - 119/47)  RR: 18 (05-22-19 @ 05:14) (17 - 18)  SpO2: 96% (05-22-19 @ 05:14) (95% - 97%)  Wt(kg): --  CAPILLARY BLOOD GLUCOSE          Labs                          11.6   37.63 )-----------( 65       ( 21 May 2019 07:26 )             36.6       05-21    149<H>  |  119<H>  |  26<H>  ----------------------------<  94  3.4<L>   |  21<L>  |  2.01<H>    Ca    7.6<L>      21 May 2019 07:26              .Blood  05-17 @ 00:36   No growth at 5 days.  --  --          Radiology Results      Meds    MEDICATIONS  (STANDING):  dextrose 5% with potassium chloride 20 mEq/L 1000 milliLiter(s) (60 mL/Hr) IV Continuous <Continuous>  dextrose 5%. 1000 milliLiter(s) (50 mL/Hr) IV Continuous <Continuous>  heparin  Injectable 5000 Unit(s) SubCutaneous every 12 hours  mirtazapine 15 milliGRAM(s) Oral at bedtime      MEDICATIONS  (PRN):      Physical Exam    Neuro :  no focal deficits  Respiratory: CTA B/L  CV: RRR, S1S2, no murmurs,   Abdominal: Soft, NT, ND +BS,  Extremities: No edema, + peripheral pulses    ASSESSMENT    Dehydration  Yes  Yes  Yes  CHF (congestive heart failure)  UTI (urinary tract infection)  Dementia  Hypertension  Depression  HTN (hypertension)  CLL (chronic lymphocytic leukemia)  Femur fracture, right      PLAN Patient is a 89y old  Female who presents with a chief complaint of weakness (22 May 2019 08:20)    pt seen in icu [  ], reg med floor [x   ], bed [x  ], chair at bedside [   ], awake and responsive [ x ], lethargic [  ],  nad [ x ]      Allergies    No Known Allergies        Vitals    T(F): 97.9 (05-22-19 @ 05:14), Max: 98.1 (05-21-19 @ 21:12)  HR: 78 (05-22-19 @ 05:14) (71 - 78)  BP: 119/47 (05-22-19 @ 05:14) (104/83 - 119/47)  RR: 18 (05-22-19 @ 05:14) (17 - 18)  SpO2: 96% (05-22-19 @ 05:14) (95% - 97%)  Wt(kg): --  CAPILLARY BLOOD GLUCOSE          Labs                          11.6   37.63 )-----------( 65       ( 21 May 2019 07:26 )             36.6       05-21    149<H>  |  119<H>  |  26<H>  ----------------------------<  94  3.4<L>   |  21<L>  |  2.01<H>    Ca    7.6<L>      21 May 2019 07:26              .Blood  05-17 @ 00:36   No growth at 5 days.  --  --          Radiology Results      Meds    MEDICATIONS  (STANDING):  dextrose 5% with potassium chloride 20 mEq/L 1000 milliLiter(s) (60 mL/Hr) IV Continuous <Continuous>  dextrose 5%. 1000 milliLiter(s) (50 mL/Hr) IV Continuous <Continuous>  heparin  Injectable 5000 Unit(s) SubCutaneous every 12 hours  mirtazapine 15 milliGRAM(s) Oral at bedtime      MEDICATIONS  (PRN):      Physical Exam      Neuro :  no focal deficits  Respiratory: CTA B/L  CV: RRR, S1S2, no murmurs,   Abdominal: Soft, NT, ND +BS,  Extremities: No edema, + peripheral pulses      ASSESSMENT    uti,   hypernatremia,    encephalopathy,  transaminitis,   damon,   cholelithiasis   h/o   Dementia  Hypertension  Depression  HTN (hypertension)  CLL (chronic lymphocytic leukemia) with elevated wbc   Femur fracture, right      PLAN    abx d/c'd  blood cx neg noted above  f/u ucx  renal f/u.  Continue IV fluids, D5W 100 ml/hour.  serum sodium and creat improving  DAMON with mild improvement  renal and bladder US noted   palliative eval noted  Family does not want artificial feed/PEG; they do want her to suffer as she does not have good quality of life.    Family is interested in home hospice.    MOLST drafted; DNR/DNR/DNH placed in the chart.  swallow eval noted   dysphagia puree diet with honey thick liquids SPOON FEED HALF TEASPOON FOR ALL, as tolerated, for comfort feeding.  cont current meds  pt stable for d/c home with hospice care

## 2019-05-22 NOTE — DISCHARGE NOTE PROVIDER - HOSPITAL COURSE
89/F from home with 24*7 HHA with PMH of CHF, CLL, dementia, depression, HTN, UTI, BIB daughter to the ED for weakness and jerking of hands and neck today without foaming at the mouth. HHA at bedside reports that for last 3 days she was not able to sleep feeling more agitated biting her self and was more aggressive then her baseline. Today HHA noted her to have jerking movement of her arms and had so she was  brought to hospital. Patient was awake during episode, no foaming, tongue bite, urinary or bladder incontinence noted during episode. HHA also reports that she was eating, drinking. Reports being on sodium supplement. Denies nausea, vomiting, diarrhea, abdominal pain, SOB, chest pain, HOLLOWAY, palpitations, dizziness, headache, cough, wheezing, joint pain or swelling, fever, chills.         Pt. admitted to medicine for severe dehydration associated with hypernatremia with Na184, elevated lactic acid, renal insufficiency, cystitis and aspiration PNA.  Pt. admitted lethargic with minimal response to tactile stimuli.    Pt. followed by ID Dr. Ventura, treated with IV Levaquin.    Pt. followed by nephro Dr. Ratliff, hypernatremia gradually corrected with IVF hydration, Na down to 149.  Also noted with improved renal function to baseline CKD.    Pt. with slightly improved mental status, awake with occasional words mostly incoherent.  Pt. with advanced dementia, agitated with periods of delirium as reported by HHA.    Pt. with dysphagia, diet per s/s evaluation not tolerated as pt. either refused food or coughs with very little food.    GOC discussed with family by palliative care.  PEG option with TF discussed however family chose comfort feed with no artificial TF.  Family would like home hospice care at this time. 89/F from home with 24*7 HHA with PMH of CHF, CLL, dementia, depression, HTN, UTI, BIB daughter to the ED for weakness and jerking of hands and neck today without foaming at the mouth. HHA at bedside reports that for last 3 days she was not able to sleep feeling more agitated biting her self and was more aggressive then her baseline. Today HHA noted her to have jerking movement of her arms and had so she was  brought to hospital. Pt. was  admitted to medicine for severe dehydration associated with hypernatremia with Na184, elevated lactic acid, renal insufficiency, cystitis and aspiration PNA.      GOC discussed with family by palliative care. due to advanced dementia.  PEG option with TF discussed however family chose comfort feed with no artificial TF.  Family would like home hospice care at this time and d/c home with [facility] home [facility].

## 2019-05-22 NOTE — CHART NOTE - NSCHARTNOTEFT_GEN_A_CORE
Pt. was supposed to be discharged to home with home hospice care today however equipment such as hospital bed is not delivered yet.  Also family requested discharge to take place on Friday since they are not prepared yet due to death in family.  ROJAS following.

## 2019-05-22 NOTE — DISCHARGE NOTE PROVIDER - NSDCQMCOGNITION_NEU_ALL_CORE
Difficulty concentrating/Difficulty making decisions Difficulty concentrating/Difficulty remembering/Difficulty making decisions

## 2019-05-23 RX ORDER — DEXTROSE MONOHYDRATE, SODIUM CHLORIDE, AND POTASSIUM CHLORIDE 50; .745; 4.5 G/1000ML; G/1000ML; G/1000ML
1000 INJECTION, SOLUTION INTRAVENOUS
Refills: 0 | Status: DISCONTINUED | OUTPATIENT
Start: 2019-05-23 | End: 2019-05-24

## 2019-05-23 RX ADMIN — HEPARIN SODIUM 5000 UNIT(S): 5000 INJECTION INTRAVENOUS; SUBCUTANEOUS at 17:46

## 2019-05-23 RX ADMIN — MIRTAZAPINE 15 MILLIGRAM(S): 45 TABLET, ORALLY DISINTEGRATING ORAL at 23:14

## 2019-05-23 RX ADMIN — HEPARIN SODIUM 5000 UNIT(S): 5000 INJECTION INTRAVENOUS; SUBCUTANEOUS at 05:24

## 2019-05-23 NOTE — DIETITIAN INITIAL EVALUATION ADULT. - PHYSICAL APPEARANCE
contracted/Nutrition focused physical exam conducted:  Subcutaneous fat loss: [ ] Orbital fat pads region, [Moderate] Buccal fat region, [Moderate] Triceps region,  [ ]Ribs region.  Muscle wasting: [Severe] Temples region, [Severe] Clavicle region, [ ]Shoulder region, [ ]Scapula region, [Severe] Interosseous region,  [ ]thigh region, [Moderate] Calf region/emaciated/debilitated/other (specify)

## 2019-05-23 NOTE — CHART NOTE - NSCHARTNOTEFT_GEN_A_CORE
Upon Nutritional Assessment by the Registered Dietitian your patient was determined to meet criteria / has evidence of the following diagnosis/diagnoses:          [ ]  Mild Protein Calorie Malnutrition        [ ]  Moderate Protein Calorie Malnutrition        [X ] Severe Protein Calorie Malnutrition        [ ] Unspecified Protein Calorie Malnutrition        [ ] Underweight / BMI <19        [ ] Morbid Obesity / BMI > 40      Findings as based on:  •  Comprehensive nutrition assessment and consultation  •  Calorie counts (nutrient intake analysis)  •  Food acceptance and intake status from observations by staff  •  Follow up  •  Patient education  •  Intervention secondary to interdisciplinary rounds  •   concerns      Treatment:    The following diet has been recommended:      PROVIDER Section:     By signing this assessment you are acknowledging and agree with the diagnosis/diagnoses assigned by the Registered Dietitian    Comments:  Add Ensure Pudding 120ml x tid (510kcal 12g protein) as medically feasible

## 2019-05-23 NOTE — DIETITIAN INITIAL EVALUATION ADULT. - OTHER INFO
Patient seen for LOS. Patient from home has 24/7 HHA. Visited pt. alert but very weak, HHA at bedside reports, eating "okay" but po intake started to decreased >3days & eating very little meals, also reports of recent coughing/choking on meals, stated pt. loss wt. but unable to give amounts, per last admission wt. pt. weighed 130 Lbs on 5/17/17 noted in [place] & current wt. = 117 Lbs, also was able to conduct nutrition focus physical exam & found signs of malnutrition, see above fields. Per HHA pt. with poor oral intake while in the hospic Patient seen for LOS. Patient from home has 24/7 HHA. Visited pt. alert but very weak, HHA at bedside reports, eating "okay" but po intake started to decreased >3days & eating very little meals, also reports of recent coughing/choking on meals, stated pt. loss wt. but unable to give amounts, per last admission wt. pt. weighed 130 Lbs on 5/17/17 noted in [place] & current wt. 117 Lbs, also was able to conduct nutrition focus physical exam & found signs of malnutrition, see above fields. Per HHA pt. with poor oral intake while in the hospital, consuming <25% & observed breakfast tray, seen by Palliative Care team & family wants no PEG placement, only comfort measure noted, skin intact, followed by , awaiting home hospice placement, d/w RN.

## 2019-05-23 NOTE — PROGRESS NOTE ADULT - SUBJECTIVE AND OBJECTIVE BOX
Patient is a 89y old  Female who presents with a chief complaint of weakness (22 May 2019 09:38)    pt seen in icu [  ], reg med floor [x   ], bed [x  ], chair at bedside [   ], awake and responsive [ x ], lethargic [  ],  nad [ x ]        Allergies    No Known Allergies        Vitals    T(F): 97.9 (05-23-19 @ 05:26), Max: 97.9 (05-22-19 @ 13:51)  HR: 70 (05-23-19 @ 05:26) (69 - 72)  BP: 134/52 (05-23-19 @ 05:26) (105/53 - 134/52)  RR: 18 (05-23-19 @ 05:26) (18 - 18)  SpO2: 97% (05-23-19 @ 05:26) (96% - 98%)  Wt(kg): --  CAPILLARY BLOOD GLUCOSE          Labs                      .Blood  05-17 @ 00:36   No growth at 5 days.  --  --          Radiology Results      Meds    MEDICATIONS  (STANDING):  dextrose 5% with potassium chloride 20 mEq/L 1000 milliLiter(s) (60 mL/Hr) IV Continuous <Continuous>  dextrose 5% with potassium chloride 20 mEq/L 1000 milliLiter(s) (60 mL/Hr) IV Continuous <Continuous>  dextrose 5%. 1000 milliLiter(s) (50 mL/Hr) IV Continuous <Continuous>  heparin  Injectable 5000 Unit(s) SubCutaneous every 12 hours  mirtazapine 15 milliGRAM(s) Oral at bedtime      MEDICATIONS  (PRN):  guaiFENesin    Syrup 100 milliGRAM(s) Oral every 6 hours PRN Cough      Physical Exam      Neuro :  no focal deficits  Respiratory: CTA B/L  CV: RRR, S1S2, no murmurs,   Abdominal: Soft, NT, ND +BS,  Extremities: No edema, + peripheral pulses      ASSESSMENT    uti,   hypernatremia,    encephalopathy,  transaminitis,   damon,   cholelithiasis   h/o   Dementia  Hypertension  Depression  HTN (hypertension)  CLL (chronic lymphocytic leukemia) with elevated wbc   Femur fracture, right      PLAN    abx d/c'd  blood cx neg noted above  f/u ucx  renal f/u.  Continue IV fluids, D5W 100 ml/hour.  serum sodium and creat improving  DAMON with mild improvement  renal and bladder US noted   palliative eval noted  Family does not want artificial feed/PEG; they do want her to suffer as she does not have good quality of life.    Family is interested in home hospice.    MOLST drafted; DNR/DNR/DNH placed in the chart.  swallow eval noted   dysphagia puree diet with honey thick liquids SPOON FEED HALF TEASPOON FOR ALL, as tolerated, for comfort feeding.  cont current meds  pt stable for d/c home with hospice care

## 2019-05-23 NOTE — DIETITIAN INITIAL EVALUATION ADULT. - PERTINENT LABORATORY DATA
05-21 Na149 mmol/L<H> Glu 94 mg/dL K+ 3.4 mmol/L<L> Cr  2.01 mg/dL<H> BUN 26 mg/dL<H> 05-20 Phos 2.6 mg/dL 05-19 Alb 2.4 g/dL<L>

## 2019-05-23 NOTE — DIETITIAN INITIAL EVALUATION ADULT. - FACTORS AFF FOOD INTAKE
weakness, dehydration, CHF, advance dementia, functional quadriplegia/change in mental status/other (specify)/difficulty chewing/difficulty feeding self/difficulty swallowing/persistent lack of appetite

## 2019-05-24 ENCOUNTER — TRANSCRIPTION ENCOUNTER (OUTPATIENT)
Age: 84
End: 2019-05-24

## 2019-05-24 VITALS — WEIGHT: 116.84 LBS

## 2019-05-24 PROCEDURE — 82550 ASSAY OF CK (CPK): CPT

## 2019-05-24 PROCEDURE — 85027 COMPLETE CBC AUTOMATED: CPT

## 2019-05-24 PROCEDURE — 96372 THER/PROPH/DIAG INJ SC/IM: CPT | Mod: XU

## 2019-05-24 PROCEDURE — 70450 CT HEAD/BRAIN W/O DYE: CPT

## 2019-05-24 PROCEDURE — 84540 ASSAY OF URINE/UREA-N: CPT

## 2019-05-24 PROCEDURE — 80048 BASIC METABOLIC PNL TOTAL CA: CPT

## 2019-05-24 PROCEDURE — 85610 PROTHROMBIN TIME: CPT

## 2019-05-24 PROCEDURE — 36415 COLL VENOUS BLD VENIPUNCTURE: CPT

## 2019-05-24 PROCEDURE — 84300 ASSAY OF URINE SODIUM: CPT

## 2019-05-24 PROCEDURE — 87040 BLOOD CULTURE FOR BACTERIA: CPT

## 2019-05-24 PROCEDURE — 84105 ASSAY OF URINE PHOSPHORUS: CPT

## 2019-05-24 PROCEDURE — 71045 X-RAY EXAM CHEST 1 VIEW: CPT

## 2019-05-24 PROCEDURE — 76775 US EXAM ABDO BACK WALL LIM: CPT

## 2019-05-24 PROCEDURE — 82962 GLUCOSE BLOOD TEST: CPT

## 2019-05-24 PROCEDURE — 84133 ASSAY OF URINE POTASSIUM: CPT

## 2019-05-24 PROCEDURE — 83880 ASSAY OF NATRIURETIC PEPTIDE: CPT

## 2019-05-24 PROCEDURE — 84156 ASSAY OF PROTEIN URINE: CPT

## 2019-05-24 PROCEDURE — 83735 ASSAY OF MAGNESIUM: CPT

## 2019-05-24 PROCEDURE — 83935 ASSAY OF URINE OSMOLALITY: CPT

## 2019-05-24 PROCEDURE — 92610 EVALUATE SWALLOWING FUNCTION: CPT

## 2019-05-24 PROCEDURE — 82570 ASSAY OF URINE CREATININE: CPT

## 2019-05-24 PROCEDURE — 82009 KETONE BODYS QUAL: CPT

## 2019-05-24 PROCEDURE — 93005 ELECTROCARDIOGRAM TRACING: CPT

## 2019-05-24 PROCEDURE — 80053 COMPREHEN METABOLIC PANEL: CPT

## 2019-05-24 PROCEDURE — 80076 HEPATIC FUNCTION PANEL: CPT

## 2019-05-24 PROCEDURE — 85730 THROMBOPLASTIN TIME PARTIAL: CPT

## 2019-05-24 PROCEDURE — 99291 CRITICAL CARE FIRST HOUR: CPT | Mod: 25

## 2019-05-24 PROCEDURE — 84100 ASSAY OF PHOSPHORUS: CPT

## 2019-05-24 PROCEDURE — 84443 ASSAY THYROID STIM HORMONE: CPT

## 2019-05-24 PROCEDURE — 81001 URINALYSIS AUTO W/SCOPE: CPT

## 2019-05-24 PROCEDURE — 71250 CT THORAX DX C-: CPT

## 2019-05-24 PROCEDURE — 84560 ASSAY OF URINE/URIC ACID: CPT

## 2019-05-24 PROCEDURE — 76857 US EXAM PELVIC LIMITED: CPT

## 2019-05-24 PROCEDURE — 96374 THER/PROPH/DIAG INJ IV PUSH: CPT

## 2019-05-24 PROCEDURE — 83605 ASSAY OF LACTIC ACID: CPT

## 2019-05-24 RX ADMIN — HEPARIN SODIUM 5000 UNIT(S): 5000 INJECTION INTRAVENOUS; SUBCUTANEOUS at 05:11

## 2019-05-24 RX ADMIN — DEXTROSE MONOHYDRATE, SODIUM CHLORIDE, AND POTASSIUM CHLORIDE 60 MILLILITER(S): 50; .745; 4.5 INJECTION, SOLUTION INTRAVENOUS at 05:11

## 2019-05-24 NOTE — PROGRESS NOTE ADULT - SUBJECTIVE AND OBJECTIVE BOX
Patient is a 89y old  Female who presents with a chief complaint of weakness (23 May 2019 11:26)    pt seen in icu [  ], reg med floor [x   ], bed [x  ], chair at bedside [   ], awake and responsive [ x ], lethargic [  ],  nad [ x ]      Allergies    No Known Allergies        Vitals    T(F): 98.1 (05-24-19 @ 05:11), Max: 98.1 (05-24-19 @ 05:11)  HR: 82 (05-24-19 @ 05:11) (70 - 82)  BP: 102/51 (05-24-19 @ 05:11) (102/51 - 111/39)  RR: 18 (05-24-19 @ 05:11) (18 - 18)  SpO2: 93% (05-24-19 @ 05:11) (93% - 96%)  Wt(kg): --  CAPILLARY BLOOD GLUCOSE          Labs          .Blood  05-17 @ 00:36   No growth at 5 days.  --  --          Radiology Results      Meds    MEDICATIONS  (STANDING):  dextrose 5% with potassium chloride 20 mEq/L 1000 milliLiter(s) (60 mL/Hr) IV Continuous <Continuous>  dextrose 5% with potassium chloride 20 mEq/L 1000 milliLiter(s) (60 mL/Hr) IV Continuous <Continuous>  dextrose 5%. 1000 milliLiter(s) (50 mL/Hr) IV Continuous <Continuous>  heparin  Injectable 5000 Unit(s) SubCutaneous every 12 hours  mirtazapine 15 milliGRAM(s) Oral at bedtime      MEDICATIONS  (PRN):  guaiFENesin    Syrup 100 milliGRAM(s) Oral every 6 hours PRN Cough      Physical Exam      Neuro :  no focal deficits  Respiratory: CTA B/L  CV: RRR, S1S2, no murmurs,   Abdominal: Soft, NT, ND +BS,  Extremities: No edema, + peripheral pulses      ASSESSMENT    uti,   hypernatremia,    encephalopathy,  transaminitis,   damon,   cholelithiasis   h/o   Dementia  Hypertension  Depression  HTN (hypertension)  CLL (chronic lymphocytic leukemia) with elevated wbc   Femur fracture, right      PLAN    abx d/c'd  blood cx neg noted above  f/u ucx  renal f/u.  Continue IV fluids, D5W 100 ml/hour.  serum sodium and creat improving  DAMON with mild improvement  renal and bladder US noted   palliative eval noted  Family does not want artificial feed/PEG; they do want her to suffer as she does not have good quality of life.    Family is interested in home hospice.    MOLST drafted; DNR/DNR/DNH placed in the chart.  swallow eval noted   dysphagia puree diet with honey thick liquids SPOON FEED HALF TEASPOON FOR ALL, as tolerated, for comfort feeding.  cont current meds  pt stable for d/c home with hospice care

## 2019-05-24 NOTE — PROGRESS NOTE ADULT - PROVIDER SPECIALTY LIST ADULT
Internal Medicine
Nephrology
Nephrology
Internal Medicine
Nephrology
Nephrology
Internal Medicine

## 2019-05-24 NOTE — PROGRESS NOTE ADULT - REASON FOR ADMISSION
weakness

## 2019-05-24 NOTE — DISCHARGE NOTE NURSING/CASE MANAGEMENT/SOCIAL WORK - NSDCDPATPORTLINK_GEN_ALL_CORE
You can access the [facility] Patient Portal, offered by [facility], by registering with the following website: [email or web address]

## 2021-09-04 NOTE — H&P ADULT - ASSESSMENT
The patient is a 52y Female complaining of hypertension.
89 year old female ambulates w/ walker and wheelchair w/ PMH of Dementia AAOx1-2, CLL (diagnosed 8 years ago, never treated), Depression, and CHF brought in by ambulance and HHA for an episode of unresponsiveness w/ drooling - admitting for further evaluation w/ differentials including Lasix induced Hypovolemia UTI, and unlikely Seizure

## 2022-07-20 NOTE — PATIENT PROFILE ADULT - TOBACCO USE
Caller: [facility]- [facility]    Relationship: Other    Best call back number: [phone]    What is the best time to reach you: ANYTIME    Who are you requesting to speak with (clinical staff, provider,  specific staff member): CLINICAL    What was the call regarding: LOVELYGUIDO WITH [facility] CALLED TO FIND OUT IF OFFICE RECEIVED PRIOR AUTHORIZATION REQUEST FOR Liraglutide (Saxenda) 18 MG/3ML injection pen THAT WAS FAXED 07/19/2022. SHE STATED THAT THEY DID RECEIVE ONE RECENTLY, BUT THE QUESTIONS WERE NOT ANSWERED SO THEY REFAXED IT. THEIR FAX NUMBER [phone]    Do you require a callback: IF NEEDED             Unknown if ever smoked

## 2022-07-26 NOTE — DISCHARGE NOTE ADULT - FUNCTIONAL SCREEN CURRENT LEVEL: DRESSING, MLM
(2) assistive person Surgeon/Pathologist Verbiage (Will Incorporate Name Of Surgeon From Intro If Not Blank): operated in two distinct and integrated capacities as the surgeon and pathologist.

## 2023-03-07 NOTE — ED PROVIDER NOTE - CARDIAC HEART SOUNDS
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 based on outside urine culture on 2/27/2023  · Case was discussed during hospitalization with ID  · Continue linezolid currently day 5 of 7  · Blood cultures negative    Depression  Assessment & Plan  · Stable continue abilify    Hyponatremia  Assessment & Plan  · Nonspecific hyponatremia likely secondary to poor intake  · Defer further IV fluids and encourage intake    Results from last 7 days   Lab Units 03/07/23  0521 03/06/23  1549 03/06/23  0524 03/05/23  0620   SODIUM mmol/L 133* 132* 132* 133*       Moderate protein[facility]calorie [facility] ([facility] )  Assessment & Plan  Malnutrition Findings:   Adult Malnutrition type: Chronic illness  Adult Degree of Malnutrition: Malnutrition of moderate degree  Malnutrition Characteristics: Inadequate energy, Weight loss, Fluid accumulation  360 Statement: PCM r/t cancer with chemoradiation AEB, a 6 6% unintended wt loss from 01/02/23 to present and a 12 8% unintended wt loss from 08/26/22 to present and < 75% energy intake compared to estimated energy needs for > 1 month  BMI Findings: Body mass index is 23 51 kg/m²  CAD (coronary artery disease)  Assessment & Plan  · CAD with history of PCI  No chest pain      Cancer related pain  Assessment & Plan  · Continue oxycodone as needed    Bilateral hydronephrosis  Assessment & Plan  · Bilateral hydronephrosis secondary to bladder cancer with bilateral nephrostomy tubes in place  · No new urology recommendations at this time    Stage 4 chronic kidney disease ([facility] )  Assessment & Plan  · Stable at baseline    Results from last 7 days   Lab Units 03/07/23  0521 03/06/23  0524 03/05/23  9013 03/04/23  0617 03/03/23  1754   BUN mg/dL 47* 46* 47* 48* 50*   CREATININE mg/dL 2 49* 2 37* 2 40* 2 39* 2 50*   EGFR ml/min/1 73sq m 23 24 24 24 23       Anemia  Assessment & Plan  · Chronic anemia without blood loss  · Hemoglobin stable    Bladder carcinoma (HCC)  Assessment & Plan  · Invasive carcinoma of bladder known to urology service  · No evidence of bleeding this admission  · Following up with Trisha Mortensen later this week      VTE Pharmacologic Prophylaxis: VTE Score: 8 High Risk (Score >/= 5) - Pharmacological DVT Prophylaxis Ordered: heparin  Sequential Compression Devices Ordered  Patient Centered Rounds: I have performed bedside rounds with nursing staff today  Discussions with Specialists or Other Care Team Provider: Case management    Education and Discussions with Family / Patient: Updated  (granddaughter) via phone  Time Spent for Care: This time was spent on one or more of the following: performing physical exam; counseling and coordination of care; obtaining or reviewing history; documenting in the medical record; reviewing/ordering tests, medications or procedures; communicating with other healthcare professionals and discussing with patient's family/caregivers  Current Length of Stay: 4 day(s)  Current Patient Status: Inpatient   Certification Statement: The patient will continue to require additional inpatient hospital stay due to hyponatremia and nausea  Discharge Plan: Anticipate discharge tomorrow to home  Code Status: Level 3 - DNAR and DNI      Subjective:   Patient seen and examined  Nauseous  Poor intake  Objective:   Vitals: Blood pressure 147/91, pulse 72, temperature 98 4 °F (36 9 °C), temperature source Oral, resp  rate 18, height 6' 4" (1 93 m), weight 87 6 kg (193 lb 2 oz), SpO2 100 %      Intake/Output Summary (Last 24 hours) at 3/7/2023 1214  Last data filed at 3/7/2023 1005  Gross per 24 hour   Intake 360 ml   Output 1800 ml   Net -1440 ml Physical Exam  Vitals reviewed  Constitutional:       General: He is not in acute distress  HENT:      Head: Atraumatic  Cardiovascular:      Rate and Rhythm: Regular rhythm  Heart sounds: Normal heart sounds  Pulmonary:      Effort: Pulmonary effort is normal       Breath sounds: Decreased breath sounds present  No wheezing  Abdominal:      Tenderness: There is no abdominal tenderness  There is no rebound  Musculoskeletal:         General: No swelling or tenderness  Skin:     General: Skin is warm  Neurological:      General: No focal deficit present  Mental Status: He is alert  Cranial Nerves: No cranial nerve deficit  Psychiatric:         Mood and Affect: Affect is flat  Additional Data:   Labs:  Results from last 7 days   Lab Units 03/06/23  0524 03/05/23  0620 03/04/23  0617   WBC Thousand/uL 15 69* 15 56* 15 79*   HEMOGLOBIN g/dL 8 4* 8 3* 8 5*   PLATELETS Thousands/uL 237 247 251   MCV fL 91 92 91     Results from last 7 days   Lab Units 03/07/23  0521 03/06/23  1549 03/06/23  0524 03/05/23  0620   SODIUM mmol/L 133* 132* 132* 133*   POTASSIUM mmol/L 5 2  --  5 1 4 7   CHLORIDE mmol/L 99  --  102 101   CO2 mmol/L 25  --  22 25   ANION GAP mmol/L 9  --  8 7   BUN mg/dL 47*  --  46* 47*   CREATININE mg/dL 2 49*  --  2 37* 2 40*   CALCIUM mg/dL 10 3*  --  10 0 10 3*   EGFR ml/min/1 73sq m 23  --  24 24   GLUCOSE RANDOM mg/dL 72  --  71 120                      Results from last 7 days   Lab Units 03/03/23  1534   LACTIC ACID mmol/L 1 1     Results from last 7 days   Lab Units 03/07/23  1042 03/07/23  0740 03/07/23  0537 03/06/23  2117 03/06/23  1634 03/06/23  1046 03/06/23  0744 03/05/23  2054 03/05/23  1604 03/05/23  1158 03/05/23  0759 03/04/23  2119   POC GLUCOSE mg/dl 78 124 78 118 97 131 71 83 113 90 99 154*     Results from last 7 days   Lab Units 03/03/23  1534   HEMOGLOBIN A1C % 6 7*         * I Have Reviewed All Lab Data Listed Above      Cultures:   Results from last 7 days   Lab Units 03/03/23  1702 03/03/23  1646 03/03/23  1534   BLOOD CULTURE   --  No Growth at 72 hrs  No Growth at 72 hrs  URINE CULTURE  >100,000 cfu/ml  --   --                  Lines/Drains:  Invasive Devices     Drain  Duration           Nephrostomy Left 10 2 Fr  55 days    Nephrostomy Right 10 2 Fr  55 days              Telemetry:      Imaging:  Imaging Reports Reviewed Today Include:   XR chest pa & lateral    Result Date: 3/5/2023  Impression: Similar patchy opacity of the left base compared to the PET CT of 3/1/2023, which may represent atelectasis, tumor, and/or infection/inflammation, or combination thereof  Stable small left pleural effusion compared to the PET CT of 3/1/2023  No pneumothorax   Workstation performed: [id number]       Scheduled Meds:  Current Facility-Administered Medications   Medication Dose Route Frequency Provider Last Rate   • acetaminophen  975 mg Oral Q8H PRN Aleida Nick PA-C     • aluminum-magnesium hydroxide-simethicone  30 mL Oral Q6H PRN Amos Fabiana, DO     • ARIPiprazole  2 mg Oral Daily Piyush Rana Riff, DO     • aspirin  81 mg Oral Daily Piyush Rana Riff, DO     • bimatoprost  1 drop Both Eyes HS Piyush Rana Riff, DO     • docusate sodium  100 mg Oral BID Amos Fabiana, DO     • ezetimibe  10 mg Oral Daily Piyush Rana Riff, DO     • gabapentin  300 mg Oral HS Piyush Rana Riff, DO     • heparin (porcine)  5,000 Units Subcutaneous [facility] Rana Riff, DO     • insulin glargine  10 Units Subcutaneous HS Piyush Rana Riff, DO     • lidocaine  1 patch Topical Daily Malu Crouch PA-C     • linezolid  600 mg Oral Q12H Piyush Rana Riff, DO     • metoprolol tartrate  25 mg Oral Q12H [address] Piyush Rana Riff, DO     • ondansetron  4 mg Intravenous Q6H PRN Amos Fabiana, DO     • oxybutynin  5 mg Oral TID Amos Fabiana, DO     • oxyCODONE  5 mg Oral Q4H PRN Amos Fabiana, DO     • pantoprazole  40 mg Oral Daily Before Breakfast Piyush Rana Riff, DO • polyethylene glycol  17 g Oral BID Judith Armenta PA-C     • simethicone  80 mg Oral 4x Daily PRN Ana María Frost DO     • sodium chloride  100 mL/hr Intravenous Continuous Chandu Buck DO Stopped (03/07/23 0500)       Today, Patient Was Seen By: Chandu Buck DO    ** Please Note: Dictation voice to text software may have been used in the creation of this document   ** MURMUR

## 2023-03-17 NOTE — PRE-OP CHECKLIST - AS BP NONINV SITE
right upper arm Mirvaso Counseling: Mirvaso is a topical medication which can decrease superficial blood flow where applied. Side effects are uncommon and include stinging, redness and allergic reactions.

## 2023-03-19 NOTE — DISCHARGE NOTE ADULT - [id number]_GEN_ALL_CORE
Barbi Cameron is a 54 y.o. male with past medical history notable for diabetes, CHF, hypertension, CKD presenting with abnormal labs. His nephrologist contacted him after he was found that he was hyperkalemic and in acute renal insufficiency. He states that he is nearly 10 pounds below his goal weight. He was previously on a potassium supplement which she discontinued yesterday. Denies palpitations, syncope. He has not had change in urine output. Abnormal Lab Results  Pertinent negatives include no chest pain, no abdominal pain, no headaches and no shortness of breath. Referral Request  Pertinent negatives include no chest pain, no abdominal pain, no headaches and no shortness of breath.       Past Medical History:   Diagnosis Date    Diabetes ([facility] [address]     Heart failure ([facility] [address]     CHF, cardiomyopathy    Hypertension     ICD (implantable cardioverter-defibrillator) in place     left upper chest       Past Surgical History:   Procedure Laterality Date    HX HEART CATHETERIZATION  2/2015    x1    HX IMPLANTABLE CARDIOVERTER DEFIBRILLATOR  2/16/2015         Family History:   Problem Relation Age of Onset    Diabetes Mother     Hypertension Father     Diabetes Father     Diabetes Brother     Hypertension Brother        Social History     Socioeconomic History    Marital status:      Spouse name: Not on file    Number of children: Not on file    Years of education: Not on file    Highest education level: Not on file   Occupational History    Not on file   Tobacco Use    Smoking status: Never    Smokeless tobacco: Never   Vaping Use    Vaping Use: Never used   Substance and Sexual Activity    Alcohol use: Yes     Comment: 1 a month    Drug use: No    Sexual activity: Not Currently   Other Topics Concern    Not on file   Social History Narrative    Not on file     Social Determinants of Health     Financial Resource Strain: Not on file   Food Insecurity: Not on file   Transportation Needs: Not on file Physical Activity: Not on file   Stress: Not on file   Social Connections: Not on file   Intimate Partner Violence: Not on file   Housing Stability: Not on file         ALLERGIES: Patient has no known allergies. Review of Systems   Constitutional:  Negative for chills, fatigue and fever. HENT:  Negative for ear pain, sore throat and trouble swallowing. Eyes:  Negative for visual disturbance. Respiratory:  Negative for cough and shortness of breath. Cardiovascular:  Negative for chest pain. Gastrointestinal:  Negative for abdominal pain and nausea. Genitourinary:  Negative for dysuria. Musculoskeletal:  Negative for back pain. Skin:  Negative for rash. Neurological:  Negative for light-headedness and headaches. Psychiatric/Behavioral:  Negative for confusion. All other systems reviewed and are negative. Vitals:    03/19/23 1056 03/19/23 1200 03/19/23 1204   BP: 123/82  (!) 127/91   Pulse: 76  76   Resp: 17  18   Temp: 98 °F (36.7 °C)  97.9 °F (36.6 °C)   SpO2: 99% 98% 95%   Weight: 92 kg (202 lb 13.2 oz)     Height: 5' 8\" (1.727 m)              Physical Exam  Vitals and nursing note reviewed. Constitutional:       General: He is not in acute distress. Appearance: He is well-developed. He is not toxic-appearing or diaphoretic. HENT:      Head: Normocephalic and atraumatic. Mouth/Throat:      Mouth: Mucous membranes are moist.   Eyes:      Extraocular Movements: Extraocular movements intact. Cardiovascular:      Rate and Rhythm: Normal rate and regular rhythm. Heart sounds: Normal heart sounds. Pulmonary:      Effort: Pulmonary effort is normal. No respiratory distress. Breath sounds: Normal breath sounds. Abdominal:      General: There is no distension. Palpations: Abdomen is soft. Tenderness: There is no abdominal tenderness. Musculoskeletal:      Cervical back: Normal range of motion. No rigidity. Right lower leg: No edema.       Left lower leg: No edema. Skin:     General: Skin is warm. Capillary Refill: Capillary refill takes less than 2 seconds. Neurological:      General: No focal deficit present. Mental Status: He is alert and oriented to person, place, and time. Cranial Nerves: No cranial nerve deficit. Psychiatric:         Mood and Affect: Mood normal.      Perfect Serve Consult for Admission  1:58 PM    ED Room Number: ER27/27  Patient Name and age:  Belkis Dhillon 54 y.o.  male  Working Diagnosis:   1. ANA (acute kidney injury) ([address]    2. Acute hyperkalemia        COVID-19 Suspicion:  no  Sepsis present:  no  Reassessment needed: no  Code Status:  Full Code  Readmission: no  Isolation Requirements:  no  Recommended Level of Care:  telemetry  Department: [facility] Adult ED - [phone]  Admitting Provider: Dr. Francy White Making  Patient with history of heart failure with ANA and hyperkalemia. Hyperkalemia is less severe now. There is no signs of arrhythmia. T waves are not peaked. Discussed with nephrology will evaluate the patient. Will likely need to hold diuresis. He does not have any signs of infection or excessive volume losses such as from diarrhea or vomiting. Amount and/or Complexity of Data Reviewed  Labs: ordered. Radiology: ordered. Risk  Prescription drug management. Decision regarding hospitalization. ED Course as of 23 1359   Sun Mar 19, 2023   1114 Reviewed [facility] labs which resulted 2 days ago, creatinine was found to be 5.6 and potassium 6.3. [NS]   1127 EK sinus rhythm first-degree AV block, left anterior fascicular block, ventricular rate 75, left axis deviation, no ST elevation. Lateral T wave inversions. No obvious signs of hyperkalemia.   QRS duration is 122 ms QTc is 480 ms [NS]   1349 Discussed with Dr. Mikhail Ya, agrees with holding off on further diuresis for right now, CATHY ORTEZ [facility] and hospitalist admission, will evaluate the patient [NS]      ED Course User Index  [NS] Caridad Murray MD       Procedures Requires supervison due to deteriorating mental status/Reason specified below/Fall risk/Chest pain/weakness during/after ambulation greater than 20 feet

## 2023-08-28 NOTE — ED PROVIDER NOTE - CPE EDP [facility] NORM
----- Message from Grady Wagner MD sent at 8/28/2023 12:32 PM CDT -----  COVID and influenza both negative  
Called patient. Talked to Gavin. Explained results. She verbalized understanding.   
- - -

## 2023-12-14 NOTE — SWALLOW BEDSIDE ASSESSMENT ADULT - SWALLOW EVAL: PROGNOSIS
Rx Refill Note    Requested Prescriptions     Pending Prescriptions Disp Refills    escitalopram (LEXAPRO) 20 MG tablet [Pharmacy Med Name: ESCITALOPRAM 20 MG TABLET] 90 tablet 0     Sig: TAKE 1 TABLET BY MOUTH EVERY DAY    glimepiride (AMARYL) 4 MG tablet [Pharmacy Med Name: GLIMEPIRIDE 4 MG TABLET] 180 tablet 0     Sig: TAKE 2 TABLETS BY MOUTH EVERY DAY        Last office visit with prescribing clinician: 10/23/2023      Next office visit with prescribing clinician: 1/23/2024   Last labs:   Last refill: needs   Pharmacy (be sure to add in Epic). correct              
Guarded
fair

## 2024-03-18 NOTE — DIETITIAN INITIAL EVALUATION ADULT. - NS AS NUTRI INTERV MEDICAL AND FOOD SUPPLEMENTS
EF 30% as per o/p documentation  not in exacerbatio n    -cont home lasix, spironolactone, coreg as needed/Commercial beverage significant transaminitis  -f/u RUQ u/s  -f/u acetaminophen levels

## 2025-03-01 NOTE — PATIENT PROFILE ADULT. - AS SC BRADEN FRICTION
You were seen in the Emergency Department today for evaluation of an abrasion and fall.   Your imaging studies showed no significant cause of your symptoms.  Tylenol and ibuprofen as needed for pain.  Follow up with your primary care physician to ensure resolution of symptoms. Return if you have new or worsening symptoms.    (2) potential problem

## 2025-07-23 NOTE — DIETITIAN INITIAL EVALUATION ADULT. - NS FNS WEIGHT CHANGE REASON
[FreeTextEntry1] : MRI left shoulder [facility]PR 7/14/25 Moderate supraspinatus tendinosis with a shallow bursal sided tear at the insertion. There are tiny foci of susceptibility artifact adjacent to the tear favoring sequela of previous calcific tendinitis. No large dominant calcium deposit is seen.   Mild AC joint arthrosis and mild to moderate subacromial/subdeltoid bursitis.  
unintentional

## 2025-07-28 NOTE — H&P ADULT - NEUROLOGICAL DETAILS
Subjective   Patient ID: Dereck Shen is a 58 y.o. male who presents for follow-up of congestive heart failure.   Current Medications[1]     PMH: significant for PAD with prior revascularization (R fem-pop bypass 2/24/20; L fem-pop bypass 9/14/20; L fem PTA of the graft; R iliac PTA 2/2022; LLE and DANYA PTA 4/2024; angioplasty of the left SFA 10/21/24; and left femoral and tibial thrombectomy with patch angioplasty 11/19/24), heart failure, CAD s/p CABG x5 in 09/2019, PCI of Cx, 01/04/2021 and s/p PCI of LM/Cx 01/23/2025; HTN, hyperlipidemia, DM type 2, Hepatitis C, bipolar depression, ischemic stroke, COPD, BRUNA and nicotine dependence.       Congestive Heart Failure  Presents for follow-up visit. Pertinent negatives include no abdominal pain, chest pain, chest pressure, claudication, fatigue, muscle weakness, near-syncope, nocturia, orthopnea, palpitations, paroxysmal nocturnal dyspnea, shortness of breath or unexpected weight change. The symptoms have been stable. Compliance with total regimen is %. Compliance with diet is %. Compliance with medications is %.       Review of Systems   Constitutional:  Negative for fatigue and unexpected weight change.   Respiratory:  Negative for shortness of breath.    Cardiovascular:  Negative for chest pain, palpitations, claudication and near-syncope.   Gastrointestinal:  Negative for abdominal pain.   Genitourinary:  Negative for nocturia.   Musculoskeletal:  Negative for muscle weakness.       Objective     /74 (BP Location: Left arm, Patient Position: Sitting)   Pulse 78   Resp 20   Wt 97.7 kg (215 lb 4.8 oz)   SpO2 97%   BMI 30.03 kg/m²       Transthoracic Echo (TTE) Limited  Result Date: 7/22/2025              [place] [id number]      Phone [phone] Fax [phone] TRANSTHORACIC ECHOCARDIOGRAM REPORT Patient Name:       DERECK SHEN    Reading Physician:    [id number] Baljinder Wright                                                                 MD Study Date:         7/22/2025            Ordering Provider:    [id number] IVELISSE SAVAGE MRN/PID:            [id number]             Fellow: Accession#:         [id number]         Nurse: Date of Birth/Age:  1966 / 58 years Sonographer:          Nay Coyne RDCS Gender Assigned at  M                    Additional Staff: Birth: Height:             180.34 cm            Admit Date:           7/21/2025 Weight:             95.26 kg             Admission Status:     Inpatient -                                                                Routine BSA / BMI:          2.15 m2 / 29.29      Department Location:  [facility] Echo                     kg/m2                                      Lab Blood Pressure: 126 /79 mmHg Study Type:    TRANSTHORACIC ECHO (TTE) LIMITED Diagnosis/ICD: Shortness of breath-R06.02 Indication:    NSTEMI CPT Codes:     Echo Limited-74484 Patient History: Pertinent History: ASHD, CHF, STROKE, HTN, PVD, PAD. Study Detail: The following Echo studies were performed: 2D, M-Mode, Doppler and               color flow. Technically challenging study due to poor acoustic               windows. Optison used as a contrast agent for endocardial border               definition. Total contrast used for this procedure was 2 mL via IV               push. A bubble study was not performed.  PHYSICIAN INTERPRETATION: Left Ventricle: Left ventricular ejection fraction is moderately decreased calculated by Salinas's biplane at 39%. There is global hypokinesis of the left ventricle with minor regional variations. The left ventricular cavity size is normal. There is normal septal and normal posterior left ventricular wall thickness. Left ventricular diastolic filling was indeterminate. Left Atrium: The left atrial size is normal. A bubble study using agitated saline was not performed. Right  Ventricle: The right ventricle was not assessed. Right ventricular systolic function not assessed. Right Atrium: The right atrial size was not assessed. Aortic Valve: The aortic valve was not assessed. Aortic valve regurgitation was not assessed. Mitral Valve: The mitral valve was not assessed. Mitral valve regurgitation was not assessed. The E Vmax is 0.94 m/s. Tricuspid Valve: The tricuspid valve was not assessed. Tricuspid regurgitation was not assessed. Pulmonic Valve: The pulmonic valve was not assessed. The pulmonic valve regurgitation was not assessed. Pericardium: Pericardial effusion was not assessed. Aorta: The aortic root was not assessed.  CONCLUSIONS:  1. Left ventricular ejection fraction is moderately decreased calculated by Salinas's biplane at 39%.  2. There is global hypokinesis of the left ventricle with minor regional variations.  3. Left ventricular diastolic filling was indeterminate. QUANTITATIVE DATA SUMMARY:  2D MEASUREMENTS:             Normal Ranges: IVSd:            1.00 cm     (0.6-1.1cm) LVPWd:           1.00 cm     (0.6-1.1cm) LVIDd:           5.60 cm     (3.9-5.9cm) LVIDs:           4.30 cm LV Mass Index:   102.0 g/m2 LVEDV Index:     76.18 ml/m2 LV % FS          23.2 %  LEFT ATRIUM:                  Normal Ranges: LA Vol A4C:        71.6 ml    (22+/-6mL/m2) LA Vol A2C:        53.7 ml LA Vol BP:         65.4 ml LA Vol Index A4C:  33.3ml/m2 LA Vol Index A2C:  24.9 ml/m2 LA Vol Index BP:   30.4 ml/m2 LA Area A4C:       22.3 cm2 LA Area A2C:       18.3 cm2 LA Major Axis A4C: 5.9 cm LA Major Axis A2C: 5.3 cm LA Volume Index:   30.4 ml/m2  LV SYSTOLIC FUNCTION:                      Normal Ranges: EF-A4C View:    43 % (>=55%) EF-A2C View:    35 % EF-Biplane:     39 % LV EF Reported: 39 %  LV DIASTOLIC FUNCTION:           Normal Ranges: MV Peak E:             0.94 m/s  (0.7-1.2 m/s) MV Peak A:             0.52 m/s  (0.42-0.7 m/s) E/A Ratio:             1.80      (1.0-2.2) MV e'                   0.053 m/s (>8.0) MV lateral e'          0.06 m/s MV medial e'           0.04 m/s E/e' Ratio:            17.65     (<8.0)  [id number] Danna Wright MD Electronically signed on 7/22/2025 at 9:25:11 AM  ** Final **     Transthoracic Echo Limited  Result Date: 6/20/2025              [place] [address]      Phone [phone] Fax [phone] TRANSTHORACIC ECHOCARDIOGRAM REPORT Patient Name:       CARMENAZ URBINA MAMTA   Reading Physician:    [id number] Kruse MD Study Date:         6/20/2025           Ordering Provider:    [id number] DANNA WRIGHT MRN/PID:            [id number]            Fellow: Accession#:         [id number]        Nurse:                Jennifer Durham Date of Birth/Age:  1966 / 58      Sonographer:          Laxmi crisostomo RDCS Gender Assigned at  M                   Additional Staff: Birth: Height:             180.34 cm           Admit Date: Weight:             94.35 kg            Admission Status:     Outpatient BSA / BMI:          2.14 m2 / 29.01     Department Location:  [facility] Echo                     kg/m2                                     Lab Blood Pressure: 165 /83 mmHg Study Type:    TRANSTHORACIC ECHO (TTE) LIMITED Diagnosis/ICD: Acute on chronic systolic (congestive) heart failure (CHF)-I50.23 Indication:    CARDIOMYOPATHY CPT Codes:     Echo Limited-28119 Patient History: Pertinent History: CABGx5 2019, CHF. Study Detail: The following Echo studies were performed: 2D. Technically               challenging study due to prominent lung artifact and body habitus.               Optison used as a contrast agent for endocardial border               definition. Total contrast used for this procedure was 2 mL via IV               push.  PHYSICIAN INTERPRETATION: Left Ventricle: The left ventricular systolic function is  moderately decreased with a visually estimated ejection fraction of 30-35%. There is global hypokinesis of the left ventricle with minor regional variations. The left ventricular cavity size is normal. There is normal septal and normal posterior left ventricular wall thickness. Left ventricular diastolic filling was not assessed. Left Atrium: The left atrial size was not assessed. Right Ventricle: The right ventricle was not assessed. Right ventricular systolic function not assessed. Right Atrium: The right atrial size was not assessed. Aortic Valve: The aortic valve was not assessed. Aortic valve regurgitation was not assessed. Mitral Valve: The mitral valve was not assessed. Mitral valve regurgitation was not assessed. Tricuspid Valve: The tricuspid valve was not assessed. Tricuspid regurgitation was not assessed. Pulmonic Valve: The pulmonic valve was not assessed. The pulmonic valve regurgitation was not assessed. Pericardium: Pericardial effusion was not assessed. Aorta: The aortic root was not assessed.  CONCLUSIONS:  1. The left ventricular systolic function is moderately decreased with a visually estimated ejection fraction of 30-35%.  2. There is global hypokinesis of the left ventricle with minor regional variations. QUANTITATIVE DATA SUMMARY:  2D MEASUREMENTS:             Normal Ranges: IVSd:            0.91 cm     (0.6-1.1cm) LVPWd:           0.78 cm     (0.6-1.1cm) LVIDd:           5.56 cm     (3.9-5.9cm) LVIDs:           4.89 cm LV Mass Index:   81.4 g/m2 LVEDV Index:     49.05 ml/m2 LV % FS          12.0 %  LV SYSTOLIC FUNCTION:                      Normal Ranges: EF-A4C View:    34 % (>=55%) EF-A2C View:    40 % EF-Biplane:     37 % EF-Visual:      33 % LV EF Reported: 33 %  [id number] Romeo Kruse MD Electronically signed on 6/20/2025 at 1:38:05 PM  ** Final **     Transthoracic Echo (TTE) Complete  Result Date: 1/20/2025              [address] [address]       Phone [phone] Fax [phone] TRANSTHORACIC ECHOCARDIOGRAM REPORT Patient Name:       CARMENZA URBINA MAMTA    Reading Physician:    [id number] Nahun Nelson DO Study Date:         1/20/2025            Ordering Provider:    [id number] DANNA SEARS MRN/PID:            [id number]             Fellow: Accession#:         [id number]         Nurse:                Jennifer Durham Date of Birth/Age:  1966 / 58 years Sonographer:          Luba Castellano RDCS Gender Assigned at  M                    Additional Staff: Birth: Height:             180.34 cm            Admit Date:           1/19/2025 Weight:             97.98 kg             Admission Status:     Inpatient -                                                                Routine BSA / BMI:          2.18 m2 / 30.13      Department Location:  [facility]                     kg/m2 Blood Pressure: 112 /61 mmHg Study Type:    TRANSTHORACIC ECHO (TTE) COMPLETE Diagnosis/ICD: Presence of aortocoronary bypass graft-Z95.1; Dyspnea,                unspecified-R06.00 Indication:    CABG, CHF, Dyspnea CPT Codes:     Echo Complete w Full Doppler-92106 Patient History: Pertinent History: CHF, HTN and CAD. Study Detail: The following Echo studies were performed: 2D, M-Mode, Doppler and               color flow. Optison used as a contrast agent for endocardial               border definition. Total contrast used for this procedure was 3 mL               via IV push.  PHYSICIAN INTERPRETATION: Left Ventricle: The left ventricular systolic function is moderately decreased, with a Salinas's biplane calculated ejection fraction of 36%. There is global hypokinesis of the left ventricle with minor regional variations. The left ventricular cavity size is mildly dilated. There is normal septal and normal posterior left ventricular wall thickness. Spectral Doppler shows a  Grade II (pseudonormal pattern) of left ventricular diastolic filling with an elevated left atrial pressure. Left Atrium: The left atrium is normal in size. Right Ventricle: The right ventricle is normal in size. There is mildly reduced right ventricular systolic function. Right Atrium: The right atrium is normal in size. Aortic Valve: The aortic valve is trileaflet. The aortic valve dimensionless index is 0.81. There is no evidence of aortic valve regurgitation. The peak instantaneous gradient of the aortic valve is 9 mmHg. The mean gradient of the aortic valve is 5 mmHg. Mitral Valve: The mitral valve is normal in structure. There is mild mitral valve regurgitation. Tricuspid Valve: The tricuspid valve is structurally normal. There is mild tricuspid regurgitation. The Doppler estimated RVSP is mildly elevated right ventricular systolic pressure at 44.3 mmHg. Pulmonic Valve: The pulmonic valve is not well visualized. There is trace to mild pulmonic valve regurgitation. Pericardium: No pericardial effusion noted. Aorta: The aortic root is normal.  CONCLUSIONS:  1. The left ventricular systolic function is moderately decreased, with a Salinas's biplane calculated ejection fraction of 36%.  2. There is global hypokinesis of the left ventricle with minor regional variations.  3. Spectral Doppler shows a Grade II (pseudonormal pattern) of left ventricular diastolic filling with an elevated left atrial pressure.  4. Left ventricular cavity size is mildly dilated.  5. There is mildly reduced right ventricular systolic function.  6. Mildly elevated right ventricular systolic pressure. QUANTITATIVE DATA SUMMARY:  2D MEASUREMENTS:           Normal Ranges: LAs:             5.00 cm   (2.7-4.0cm) IVSd:            0.72 cm   (0.6-1.1cm) LVPWd:           0.74 cm   (0.6-1.1cm) LVIDd:           5.78 cm   (3.9-5.9cm) LVIDs:           5.07 cm LV Mass Index:   71.0 g/m2 LV % FS          12.3 %  LA VOLUME:                    Normal  Ranges: LA Vol A4C:        63.5 ml    (22+/-6mL/m2) LA Vol A2C:        64.0 ml LA Vol BP:         65.3 ml LA Vol Index A4C:  29.2ml/m2 LA Vol Index A2C:  29.4 ml/m2 LA Vol Index BP:   30.0 ml/m2 LA Area A4C:       21.0 cm2 LA Area A2C:       21.6 cm2 LA Major Axis A4C: 5.9 cm LA Major Axis A2C: 6.2 cm LA Vol A4C:        61.0 ml LA Vol A2C:        61.1 ml LA Vol Index BSA:  28.0 ml/m2  RA VOLUME BY A/L METHOD:          Normal Ranges: RA Area A4C:             16.3 cm2  AORTA MEASUREMENTS:         Normal Ranges: Ao Sinus, d:        3.10 cm (2.1-3.5cm) Ao STJ, d:          2.40 cm (1.7-3.4cm) Asc Ao, d:          3.20 cm (2.1-3.4cm)  LV SYSTOLIC FUNCTION BY 2D PLANIMETRY (MOD):                      Normal Ranges: EF-A4C View:    35 % (>=55%) EF-A2C View:    36 % EF-Biplane:     36 % LV EF Reported: 36 %  LV DIASTOLIC FUNCTION:             Normal Ranges: MV Peak E:             1.17 m/s    (0.7-1.2 m/s) MV Peak A:             0.41 m/s    (0.42-0.7 m/s) E/A Ratio:             2.83        (1.0-2.2) MV e'                  0.061 m/s   (>8.0) MV lateral e'          0.06 m/s MV medial e'           0.06 m/s MV A Dur:              114.18 msec E/e' Ratio:            19.23       (<8.0)  MITRAL VALVE:          Normal Ranges: MV DT:        148 msec (150-240msec)  AORTIC VALVE:                     Normal Ranges: AoV Vmax:                1.48 m/s (<=1.7m/s) AoV Peak P.8 mmHg (<20mmHg) AoV Mean P.5 mmHg (1.7-11.5mmHg) LVOT Max Ayad:            1.18 m/s (<=1.1m/s) AoV VTI:                 25.83 cm (18-25cm) LVOT VTI:                20.91 cm LVOT Diameter:           1.95 cm  (1.8-2.4cm) AoV Area, VTI:           2.43 cm2 (2.5-5.5cm2) AoV Area,Vmax:           2.39 cm2 (2.5-4.5cm2) AoV Dimensionless Index: 0.81  RIGHT VENTRICLE: RV Basal 3.71 cm RV Mid   3.50 cm RV Major 8.3 cm TAPSE:   18.0 mm RV s'    0.10 m/s  TRICUSPID VALVE/RVSP:          Normal Ranges: Peak TR Velocity:     3.21 m/s RV Syst Pressure:     44  mmHg  (< 30mmHg) IVC Diam:             1.94 cm  AORTA: Asc Ao Diam 3.18 cm  [id number] Nahun Nelson DO Electronically signed on 1/20/2025 at 5:05:14 PM  ** Final **     Transthoracic Echo (TTE) Complete  Result Date: 11/20/2024              [place] [address]      Phone [phone] Fax [phone] TRANSTHORACIC ECHOCARDIOGRAM REPORT Patient Name:       [facility]   Reading Physician:    [id number] Britney Rush MD Study Date:         11/20/2024          Ordering Provider:    [id number] DANNA SEARS MRN/PID:            [id number]            Fellow: Accession#:         [id number]        Nurse:                Noemi Villa RN Date of Birth/Age:  1966 / 58      Sonographer:          Laxmi crisostomo RDCS Gender Assigned at  M                   Additional Staff: Birth: Height:             180.34 cm           Admit Date:           11/19/2024 Weight:             87.09 kg            Admission Status:     Inpatient -                                                               Routine BSA / BMI:          2.07 m2 / 26.78     Department Location:  [facility]                     kg/m2 Blood Pressure: 107 /64 mmHg Study Type:    TRANSTHORACIC ECHO (TTE) COMPLETE Diagnosis/ICD: Shortness of breath-R06.02; Unspecified systolic (congestive)                heart failure (CHF)-I50.20 Indication:    CHF, SHORTNESS OF BREATH CPT Codes:     Echo Complete w Full Doppler-34399 Patient History: Pertinent History: CABG x5 2019, HTN. Study Detail: The following Echo studies were performed: 2D, M-Mode, Doppler and               color flow. Technically challenging study due to prominent lung               artifact and body habitus. Optison used as a contrast agent for               endocardial border definition. Total contrast used for this               procedure was 3  mL via IV push.  PHYSICIAN INTERPRETATION: Left Ventricle: The left ventricular systolic function is moderately decreased, with a Salinas's biplane calculated ejection fraction of 38%. Wall motion is abnormal. The left ventricular cavity size is normal. There is normal septal and normal posterior left ventricular wall thickness. Spectral Doppler shows an abnormal pattern of left ventricular diastolic filling. Akinetic inferolateral segment, and basal inferior wall. Left Atrium: The left atrium is normal in size. Right Ventricle: The right ventricle was not well visualized. Right ventricular systolic function not assessed. Right Atrium: The right atrium is normal in size. Aortic Valve: The aortic valve appears structurally normal. The aortic valve dimensionless index is 0.73. There is no evidence of aortic valve regurgitation. The peak instantaneous gradient of the aortic valve is 9 mmHg. The mean gradient of the aortic valve is 4 mmHg. Mitral Valve: The mitral valve is normal in structure. There is no evidence of mitral valve regurgitation. Tricuspid Valve: The tricuspid valve is structurally normal. There is trace tricuspid regurgitation. Pulmonic Valve: The pulmonic valve is structurally normal. There is no indication of pulmonic valve regurgitation. Pericardium: No pericardial effusion noted. Aorta: The aortic root is normal. Systemic Veins: The inferior vena cava appears normal in size, with IVC inspiratory collapse greater than 50%.  CONCLUSIONS:  1. Poorly visualized anatomical structures due to suboptimal image quality.  2. The left ventricular systolic function is moderately decreased, with a Salinas's biplane calculated ejection fraction of 38%.  3. Abnormal wall motion.  4. Spectral Doppler shows an abnormal pattern of left ventricular diastolic filling.  5. Akinetic inferolateral segment, and basal inferior wall. QUANTITATIVE DATA SUMMARY:  2D MEASUREMENTS:           Normal Ranges: Ao Root d:       2.80  cm   (2.0-3.7cm) LAs:             3.60 cm   (2.7-4.0cm) IVSd:            0.70 cm   (0.6-1.1cm) LVPWd:           0.70 cm   (0.6-1.1cm) LVIDd:           6.10 cm   (3.9-5.9cm) LVIDs:           5.30 cm LV Mass Index:   78.5 g/m2 LV % FS          13.1 %  LA VOLUME:                    Normal Ranges: LA Vol A4C:        48.7 ml    (22+/-6mL/m2) LA Vol A2C:        49.8 ml LA Vol BP:         51.4 ml LA Vol Index A4C:  23.5ml/m2 LA Vol Index A2C:  24.0 ml/m2 LA Vol Index BP:   24.8 ml/m2 LA Area A4C:       17.6 cm2 LA Area A2C:       18.6 cm2 LA Major Axis A4C: 5.4 cm LA Major Axis A2C: 5.9 cm LA Volume Index:   24.8 ml/m2  RA VOLUME BY A/L METHOD:          Normal Ranges: RA Area A4C:             13.5 cm2  AORTA MEASUREMENTS:         Normal Ranges: Ao Sinus, d:        2.80 cm (2.1-3.5cm) Ao STJ, d:          2.60 cm (1.7-3.4cm) Asc Ao, d:          2.90 cm (2.1-3.4cm)  LV SYSTOLIC FUNCTION BY 2D PLANIMETRY (MOD):                      Normal Ranges: EF-A4C View:    38 % (>=55%) EF-A2C View:    39 % EF-Biplane:     38 % LV EF Reported: 38 %  LV DIASTOLIC FUNCTION:             Normal Ranges: MV Peak E:             0.75 m/s    (0.7-1.2 m/s) MV Peak A:             0.65 m/s    (0.42-0.7 m/s) E/A Ratio:             1.17        (1.0-2.2) MV e'                  0.069 m/s   (>8.0) MV lateral e'          0.08 m/s MV medial e'           0.06 m/s MV A Dur:              109.00 msec E/e' Ratio:            10.91       (<8.0)  MITRAL VALVE:          Normal Ranges: MV DT:        211 msec (150-240msec)  AORTIC VALVE:                     Normal Ranges: AoV Vmax:                1.46 m/s (<=1.7m/s) AoV Peak P.5 mmHg (<20mmHg) AoV Mean P.0 mmHg (1.7-11.5mmHg) LVOT Max Ayad:            1.09 m/s (<=1.1m/s) AoV VTI:                 29.40 cm (18-25cm) LVOT VTI:                21.60 cm LVOT Diameter:           2.20 cm  (1.8-2.4cm) AoV Area, VTI:           2.79 cm2 (2.5-5.5cm2) AoV Area,Vmax:           2.84 cm2 (2.5-4.5cm2)  AoV Dimensionless Index: 0.73  RIGHT VENTRICLE: TAPSE: 19.1 mm  TRICUSPID VALVE/RVSP:          Normal Ranges: Peak TR Velocity:     2.00 m/s RV Syst Pressure:     19 mmHg  (< 30mmHg) IVC Diam:             1.60 cm  [id number] Britney Rush MD Electronically signed on 11/20/2024 at 1:49:12 PM  ** Final **       7/22/2025 [facility] with PCI to the mid Lcx.   Recommendations:  Maximize medical therapy.  Follow-up with cardiology clinic.  Lipid lowering agent or Statin therapy.     ____________________________________________________________________________________  CONCLUSIONS:   1. Successful PCI of mid LCX (Culprit of ACS NSTEMI) SHIMON x 1.   2. Elevated LVEDP 27 mmHg.   3. Patent LIMA to LAD; patent SVG to OM2.     Lab Results   Component Value Date    BUN 31 (H) 07/22/2025    BUN 34 (H) 06/12/2025    CREATININE 1.39 (H) 07/22/2025    CREATININE 1.61 (H) 06/12/2025     (H) 05/09/2025    MG 2.6 (H) 05/09/2025    K 4.5 07/22/2025    K 4.8 06/12/2025     (L) 07/22/2025     06/12/2025       Constitutional:       General:  NAD   HENT:      Head: Normocephalic     Mouth: Mucous membranes are moist.      Neck:  No JVD or HJR   Eyes:      Conjunctiva/sclera: Conjunctivae normal.    Cardiovascular:      Rate and Rhythm: Normal rate and regular rhythm      Heart sounds:  S1 S2 normal, no murmur, no S3 or S4   Pulmonary:      Pulmonary effort is normal.  Normal breath sounds. No wheezing or rales.   Abdominal:      General: Bowel sounds are normal, non- tender to palpitations.   Musculoskeletal:         General:  trace edema of LE.  CHOI well.   Skin:     General: Skin is warm and dry   Neurological:      General: No focal deficit present.      Mental Status: alert and oriented to person, place, and time. Mental status is at baseline.     Psychiatric:         Mood and Affect: Normal Affect.     Assessment/Plan     Problem List Items Addressed This Visit       Atherosclerotic heart disease of native coronary artery  without angina pectoris    Chronic combined systolic and diastolic CHF (congestive heart failure)    Hypertension      Chronic systolic heart failure   EF%36  Etiology Ischemic heart disease  AHA Stage: C   NYHA class: 2-3   Volume Status:   GFR: 28    GDMT:  BB- Carvedilol 6.25 mg 1 tablet twice a day   ARB/ACEI/ARNI -  Entresto 97/103 mg twice a day.   MRA -  contraindicated with low GFR  SGLT2i - Farxiga 10 mg daily   Diuretic -   Hydralazine 50 mg three times a day.   Isosorbide mononitrate 60 mg daily   Apixaban 5 mg twice a day   Device Therapy:       CHF: Euvolemic on exam. Continue GDMT. Did not tolerate MRA due to renal insufficiency.  Continue to work on the 2 gm salt and 2 liter fluid diet.   Follow up in 4-6 weeks.  Lab work in 1 month.   Emphasized salt restriction.  Encouraged daily monitoring of the patient's weight.  Encouraged regular exercise.    2.  ASHD with hx of PCI of  LM/Cx in January 2025/ PCI to the mid Lcx 7/22/2025:   ASA/ Ticagrelor/Apixaban/ lipitor/ BB. Felling significantly improved.     3.  CKD - 4     GFR per last labs 54 .   He has followed with nephrology.       4. PAD s/p Revascularition: per Dr. Kobi MORGAN with apixaban.     5.  Type 2 insulin dependent : managed by endocrinology.     7.  Tobacco use:  currently reports not smoking.   He is using the patches.     8.  HTN:  controlled.       Hilda Hammonds, SUMAN-CNP       [1]   Current Outpatient Medications:     acetaminophen (Tylenol) 325 mg tablet, Take 2 tablets (650 mg) by mouth every 4 hours if needed for fever (temp greater than 38.0 C), headaches or mild pain (1 - 3)., Disp: , Rfl:     apixaban (Eliquis) 5 mg tablet, Take 1 tablet (5 mg) by mouth 2 times a day., Disp: 180 tablet, Rfl: 3    aspirin 81 mg chewable tablet, Chew and swallow 1 tablet (81 mg) once daily., Disp: , Rfl:     atorvastatin (Lipitor) 80 mg tablet, Take 1 tablet (80 mg) by mouth once daily at bedtime., Disp: 90 tablet, Rfl: 2    blood sugar  diagnostic (Blood Glucose Test) strip, 1 each 3 times a day., Disp: 100 strip, Rfl: 11    blood-glucose meter misc, To be used 3 times daily, Disp: 1 each, Rfl: 0    blood-glucose sensor (FreeStyle En 3 Plus Sensor) device, Apply sensor every 15 days to monitor blood sugar, Disp: 2 each, Rfl: 11    carbamide peroxide (Ear Wax Removal Drops) 6.5 % otic solution, Instill 5 drops into both ears daily for 4 days (Patient taking differently: Administer 5 drops into each ear if needed.), Disp: 15 mL, Rfl: 0    carvedilol (Coreg) 6.25 mg tablet, Take 1 tablet by mouth every 12 hours, Disp: 180 tablet, Rfl: 3    Creon 36,000-114,000- 180,000 unit capsule,delayed release(DR/EC) capsule, Take 2 capsules by mouth 3 times a day as needed., Disp: , Rfl:     dapagliflozin propanediol (Farxiga) 10 mg tablet, Take 1 tablet (10 mg) by mouth once daily with breakfast., Disp: 90 tablet, Rfl: 2    dextromethorphan-guaifenesin (Mucinex DM)  mg 12 hr tablet, Take 2 tablets by mouth every 12 hours, Disp: 40 tablet, Rfl: 0    ELDERBERRY FRUIT ORAL, Take 1 tablet by mouth once daily as needed., Disp: , Rfl:     ezetimibe (Zetia) 10 mg tablet, Take 1 tablet by mouth once daily., Disp: 90 tablet, Rfl: 3    ferrous sulfate (FeroSuL) 325 mg (65 mg elemental) tablet, Take 1 tablet (325 mg) by mouth once daily with breakfast., Disp: 30 tablet, Rfl: 3    FreeStyle En 3 [place] misc, Use as instructed, Disp: 1 each, Rfl: 0    furosemide (Lasix) 40 mg tablet, Take 1 tablet by mouth four times a week. (Patient taking differently: Take 1 tablet (40 mg) by mouth once daily. On Monday-Thursday), Disp: 48 tablet, Rfl: 3    gabapentin (Neurontin) 400 mg capsule, Take 1 capsule (400 mg) by mouth 3 times a day., Disp: 90 capsule, Rfl: 5    GINGER ROOT EXTRACT ORAL, Take 1 tablet by mouth once daily as needed., Disp: , Rfl:     hydrALAZINE (Apresoline) 50 mg tablet, Take 1 tablet (50 mg) by mouth 3 times a day., Disp: 270 tablet, Rfl: 3    insulin  "glargine (Lantus) 100 unit/mL (3 mL) pen, Inject 10 Units under the skin once daily at bedtime., Disp: 15 mL, Rfl: 5    insulin lispro (HumaLOG) 100 unit/mL pen, Inject 14 Units under the skin 3 times daily (morning, midday, late afternoon)., Disp: 30 mL, Rfl: 5    isosorbide mononitrate ER (Imdur) 60 mg 24 hr tablet, Take 1 tablet (60 mg) by mouth once daily., Disp: 90 tablet, Rfl: 3    lancets 30 gauge misc, 1 each 3 times a day., Disp: 100 each, Rfl: 11    medical cannabis, Not [facility] prescribed, Disp: , Rfl:     OXcarbazepine (Trileptal) 150 mg tablet, Take 3 tablets (450 mg) by mouth 2 times a day., Disp: 540 tablet, Rfl: 3    pantoprazole (ProtoNix) 40 mg EC tablet, TAKE 1 TABLET BY MOUTH ONCE DAILY, Disp: 90 tablet, Rfl: 3    pen needle, diabetic (BD Ultra-Fine Jocy Pen Needle) 32 gauge x 5/32\" needle, Pen Needles for Insulin., Disp: 100 each, Rfl: 2    pen needle, diabetic (BD Ultra-Fine Jocy Pen Needle) 32 gauge x 5/32\" needle, Use to inject insulin up to 8 times per day., Disp: 400 each, Rfl: 0    pen needle, diabetic (TechLITE Pen Needle) 32 gauge x 1/4\" needle, Use to inject 1-4 times daily as directed, Disp: 100 each, Rfl: 11    ranolazine (Ranexa) 500 mg 12 hr tablet, Take 1 tablet by mouth twice daily., Disp: 180 tablet, Rfl: 3    sacubitriL-valsartan (Entresto)  mg tablet, Take 1 tablet by mouth twice daily., Disp: 180 tablet, Rfl: 3    ticagrelor (Brilinta) 90 mg tablet, Take 1 tablet (90 mg) by mouth 2 times a day., Disp: 180 tablet, Rfl: 3    tiZANidine (Zanaflex) 2 mg tablet, Take 1 tablet (2 mg) by mouth once daily as needed for muscle spasms., Disp: 30 tablet, Rfl: 0    " strength decreased/responds to verbal commands/sensation intact